# Patient Record
Sex: FEMALE | Race: WHITE | NOT HISPANIC OR LATINO | Employment: PART TIME | URBAN - NONMETROPOLITAN AREA
[De-identification: names, ages, dates, MRNs, and addresses within clinical notes are randomized per-mention and may not be internally consistent; named-entity substitution may affect disease eponyms.]

---
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
## 2019-05-07 ENCOUNTER — OFFICE VISIT (OUTPATIENT)
Dept: OBSTETRICS AND GYNECOLOGY | Facility: CLINIC | Age: 19
End: 2019-05-07

## 2019-05-07 VITALS
DIASTOLIC BLOOD PRESSURE: 56 MMHG | WEIGHT: 207.2 LBS | SYSTOLIC BLOOD PRESSURE: 104 MMHG | BODY MASS INDEX: 38.13 KG/M2 | HEIGHT: 62 IN

## 2019-05-07 DIAGNOSIS — Z31.69 ENCOUNTER FOR PRECONCEPTION CONSULTATION: Primary | ICD-10-CM

## 2019-05-07 DIAGNOSIS — Z31.9 DESIRE FOR PREGNANCY: ICD-10-CM

## 2019-05-07 PROCEDURE — 99213 OFFICE O/P EST LOW 20 MIN: CPT | Performed by: ADVANCED PRACTICE MIDWIFE

## 2019-05-07 NOTE — PROGRESS NOTES
Subjective   Yuridia Irizarry is a 18 y.o. female.     Yuridia is an obese WN, WD female who presents because she desires pregnancy. She has not been using protection for several months. She does not know how to count cycle days, timed intercourse, or ovulation.  She has periods every 21-28 days usually. They are of normal flow & no cramping         The following portions of the patient's history were reviewed and updated as appropriate: allergies, current medications, past family history, past medical history, past social history, past surgical history and problem list.    Review of Systems   Constitutional: Negative.    HENT: Negative.    Eyes: Negative.    Respiratory: Negative.    Cardiovascular: Negative.    Gastrointestinal: Negative.    Endocrine: Negative.    Genitourinary: Negative.    Musculoskeletal: Negative.    Skin: Negative.    Allergic/Immunologic: Negative.    Neurological: Negative.    Hematological: Negative.    Psychiatric/Behavioral: Negative.        Objective   Physical Exam   Constitutional: She is oriented to person, place, and time. She appears well-developed and well-nourished.   HENT:   Head: Normocephalic and atraumatic.   Eyes: EOM are normal. Pupils are equal, round, and reactive to light.   Neck: Normal range of motion.   Pulmonary/Chest: Effort normal.   Musculoskeletal: Normal range of motion.   Neurological: She is alert and oriented to person, place, and time.   Skin: Skin is warm and dry.   Psychiatric: She has a normal mood and affect. Her behavior is normal. Judgment and thought content normal.         Assessment/Plan   1. Desires pregnancy   2. Folic acid for the prevention of neural tube defects was discussed.  At least 0.4 mg should be taken preconceptionally to reduce the risk.  It was explained that this may reduce the baseline incidence of neural tube defect by as much as 65%.  Folic acid can be found in OTC multivitamins, OTC prenatal vitamins or breakfast cereal  that that contains 100% of the RDA of folate.  3, Multivitamin for both self & SO  4. Education given on cycle day, ovulation, & timed intercourse  5. Will test for ovulation & used timed intercourse for 2-3 months. If not ovulating will call office

## 2019-07-30 ENCOUNTER — LAB (OUTPATIENT)
Dept: LAB | Facility: HOSPITAL | Age: 19
End: 2019-07-30

## 2019-07-30 ENCOUNTER — TELEPHONE (OUTPATIENT)
Dept: OBSTETRICS AND GYNECOLOGY | Facility: CLINIC | Age: 19
End: 2019-07-30

## 2019-07-30 DIAGNOSIS — Z32.00 PREGNANCY EXAMINATION OR TEST, PREGNANCY UNCONFIRMED: Primary | ICD-10-CM

## 2019-07-30 DIAGNOSIS — Z32.00 PREGNANCY EXAMINATION OR TEST, PREGNANCY UNCONFIRMED: ICD-10-CM

## 2019-07-30 LAB — HCG INTACT+B SERPL-ACNC: 1.22 MIU/ML

## 2019-07-30 PROCEDURE — 84702 CHORIONIC GONADOTROPIN TEST: CPT

## 2019-07-30 PROCEDURE — 36415 COLL VENOUS BLD VENIPUNCTURE: CPT

## 2019-07-30 NOTE — TELEPHONE ENCOUNTER
----- Message from Alexandra Early sent at 7/30/2019 10:15 AM CDT -----  Pt says she believes she is pregnant and would like labs to confirm. Pt is wanting to come in Thursday to do the labs

## 2019-07-30 NOTE — TELEPHONE ENCOUNTER
This pt says that she had a negative home pregnancy test, but she is having all the symptoms of pregnancy.  She is requesting a serum hcg.  She has an appt on 8/13.  I advised the pt to just keep her appt in August and that we will not do labs until she is seen.  Pt verbalized understanding.

## 2019-08-13 ENCOUNTER — OFFICE VISIT (OUTPATIENT)
Dept: OBSTETRICS AND GYNECOLOGY | Facility: CLINIC | Age: 19
End: 2019-08-13

## 2019-08-13 ENCOUNTER — APPOINTMENT (OUTPATIENT)
Dept: LAB | Facility: HOSPITAL | Age: 19
End: 2019-08-13

## 2019-08-13 VITALS
HEIGHT: 62 IN | DIASTOLIC BLOOD PRESSURE: 67 MMHG | WEIGHT: 202 LBS | HEART RATE: 94 BPM | BODY MASS INDEX: 37.17 KG/M2 | SYSTOLIC BLOOD PRESSURE: 106 MMHG

## 2019-08-13 DIAGNOSIS — N92.6 IRREGULAR PERIODS: Primary | ICD-10-CM

## 2019-08-13 LAB
ESTRADIOL SERPL HS-MCNC: 140 PG/ML
FSH SERPL-ACNC: 4.3 MIU/ML
HBA1C MFR BLD: 5.5 % (ref 4.8–5.6)
LH SERPL-ACNC: 10.2 MIU/ML
PROGEST SERPL-MCNC: <0.05 NG/ML
TSH SERPL DL<=0.05 MIU/L-ACNC: 2.54 MIU/ML (ref 0.27–4.2)

## 2019-08-13 PROCEDURE — 99213 OFFICE O/P EST LOW 20 MIN: CPT | Performed by: NURSE PRACTITIONER

## 2019-08-13 PROCEDURE — 84402 ASSAY OF FREE TESTOSTERONE: CPT | Performed by: NURSE PRACTITIONER

## 2019-08-13 PROCEDURE — 84403 ASSAY OF TOTAL TESTOSTERONE: CPT | Performed by: NURSE PRACTITIONER

## 2019-08-13 PROCEDURE — 82627 DEHYDROEPIANDROSTERONE: CPT | Performed by: NURSE PRACTITIONER

## 2019-08-13 PROCEDURE — 36415 COLL VENOUS BLD VENIPUNCTURE: CPT | Performed by: NURSE PRACTITIONER

## 2019-08-13 PROCEDURE — 83525 ASSAY OF INSULIN: CPT | Performed by: NURSE PRACTITIONER

## 2019-08-13 PROCEDURE — 83036 HEMOGLOBIN GLYCOSYLATED A1C: CPT | Performed by: NURSE PRACTITIONER

## 2019-08-13 PROCEDURE — 84270 ASSAY OF SEX HORMONE GLOBUL: CPT | Performed by: NURSE PRACTITIONER

## 2019-08-13 PROCEDURE — 82670 ASSAY OF TOTAL ESTRADIOL: CPT | Performed by: NURSE PRACTITIONER

## 2019-08-13 PROCEDURE — 84443 ASSAY THYROID STIM HORMONE: CPT | Performed by: NURSE PRACTITIONER

## 2019-08-13 PROCEDURE — 84144 ASSAY OF PROGESTERONE: CPT | Performed by: NURSE PRACTITIONER

## 2019-08-13 PROCEDURE — 83001 ASSAY OF GONADOTROPIN (FSH): CPT | Performed by: NURSE PRACTITIONER

## 2019-08-13 PROCEDURE — 83002 ASSAY OF GONADOTROPIN (LH): CPT | Performed by: NURSE PRACTITIONER

## 2019-08-13 RX ORDER — LANOLIN ALCOHOL/MO/W.PET/CERES
400 CREAM (GRAM) TOPICAL DAILY
COMMUNITY
End: 2020-02-25

## 2019-08-13 NOTE — PROGRESS NOTES
Subjective   Chief Complaint   Patient presents with   • Follow-up     3 month follow up     Yuridia Irizarry is a 18 y.o. year old No obstetric history on file. presenting to be seen with complaints of trouble with her menses and she is wanting to get pregnant.   Current birth control method: none.    Patient's last menstrual period was 08/01/2019 (approximate).    Sometimes her periods are 21-28 days apart but she will miss periods randomly as well. She had a period on May 3rd, June 6th, missed July and started again on August 1st. She used home ovulation kits in the months of May and June and had positive results. She did not test in July.     · Additional notable symptoms: none  · Changes in weight: weight has been stable  · Recent increase in stress? no  · Is there associated pain ? no    Past 6 month menstrual history:    Cycle Frequency: vary between 21 and 28 days   Menstrual cycle character: flow is typically normal   Cycle Duration: 5 - 6   Number of heavy days of flows: 2   Dysmenorrhea: mild and is not affecting her activities of daily living   PMS: mild and is not affecting her activities of daily living   Intermenstrual bleeding present: {no   Post-coital bleeding present: no     She is sexually active.  In the past 12 months there has not been new sexual partners.  Condoms are not typically used.  She would not like to be screened for STD's at today's exam.     No Additional Complaints Reported    The following portions of the patient's history were reviewed and updated as appropriate:problem list, current medications, allergies, past medical history, past social history and past surgical history    Social History    Tobacco Use      Smoking status: Never Smoker      Smokeless tobacco: Never Used    Review of Systems   Constitutional: Negative for activity change, appetite change, diaphoresis, fatigue, unexpected weight gain and unexpected weight loss.   Respiratory: Negative for chest  "tightness and shortness of breath.    Cardiovascular: Negative for chest pain and palpitations.   Gastrointestinal: Negative for abdominal distention, abdominal pain, constipation and diarrhea.   Genitourinary: Positive for menstrual problem. Negative for amenorrhea, breast discharge, breast lump, breast pain, decreased libido, dyspareunia, dysuria, pelvic pain, vaginal bleeding, vaginal discharge and vaginal pain.   Musculoskeletal: Negative for myalgias.   Skin: Negative for color change, dry skin and skin lesions.   Neurological: Negative for light-headedness and headache.   Psychiatric/Behavioral: Negative for agitation, dysphoric mood, sleep disturbance, depressed mood and stress. The patient is not nervous/anxious.         Objective   /67   Pulse 94   Ht 156.2 cm (61.5\")   Wt 91.6 kg (202 lb)   LMP 08/01/2019 (Approximate)   Breastfeeding? No   BMI 37.55 kg/m²     Physical Exam   Constitutional: She is oriented to person, place, and time. Vital signs are normal. She appears well-developed and well-nourished. No distress.   Neck: No thyromegaly present.   Cardiovascular: Normal rate, regular rhythm and normal heart sounds.   Pulmonary/Chest: Effort normal and breath sounds normal.   Neurological: She is alert and oriented to person, place, and time.   Skin: Skin is warm and dry. She is not diaphoretic.   Psychiatric: She has a normal mood and affect. Her behavior is normal.   Nursing note and vitals reviewed.      Lab Review   No data reviewed    Imaging   No data reviewed         Diagnoses and all orders for this visit:    Irregular periods  -     Estradiol  -     Follicle Stimulating Hormone  -     Insulin, Total  -     Luteinizing Hormone  -     Progesterone  -     Sex Horm Binding Globulin  -     Testosterone, F Eqlib & T LC / MS  -     TSH  -     Hemoglobin A1c  -     DHEA-Sulfate    Other orders  -     Prenatal Vit-Fe Fumarate-FA (PRENATAL VITAMIN PO); Take  by mouth.  -     folic acid " (FOLVITE) 400 MCG tablet; Take 400 mcg by mouth Daily.        No orders of the defined types were placed in this encounter.    Will call with lab results when available and discuss any changes.     This note was electronically signed.    ROSALES Spence    August 13, 2019

## 2019-08-14 LAB
DHEA-S SERPL-MCNC: 177.4 UG/DL (ref 110–433.2)
INSULIN SERPL-ACNC: 20 UIU/ML (ref 2.6–24.9)
SHBG SERPL-SCNC: 51.3 NMOL/L (ref 24.6–122)

## 2019-08-16 LAB
TESTOST FREE MFR SERPL: 2.05 % (ref 0.5–2.8)
TESTOST FREE SERPL-MCNC: 0.56 NG/DL (ref 0.1–0.85)
TESTOST SERPL-MCNC: 27.3 NG/DL (ref 10–55)

## 2020-02-18 ENCOUNTER — TELEPHONE (OUTPATIENT)
Dept: OBSTETRICS AND GYNECOLOGY | Facility: CLINIC | Age: 20
End: 2020-02-18

## 2020-02-18 NOTE — TELEPHONE ENCOUNTER
----- Message from ROSALES Nevarez sent at 2/18/2020  1:31 PM CST -----  Just checked her thyroid in August and it was normal. If she has concerns about further issues with her thyroid then she needs to see a PCP.  ----- Message -----  From: Rylee Horvath LPN  Sent: 2/18/2020   1:06 PM CST  To: ROSALES Nevarez        ----- Message -----  From: Mery Arthur RegSched Rep  Sent: 2/18/2020  11:26 AM CST  To: Rylee Horvath LPN    PT WANTS TO GET HER THYROID CHECKED, COULD YOU PUT IN AN ORDER FOR THAT OR DO WE NEED TO SEE HER BEFORE WE PUT THAT ORDER IN

## 2020-02-25 ENCOUNTER — HOSPITAL ENCOUNTER (EMERGENCY)
Facility: HOSPITAL | Age: 20
Discharge: HOME | End: 2020-02-25
Attending: EMERGENCY MEDICINE | Admitting: EMERGENCY MEDICINE

## 2020-02-25 VITALS
SYSTOLIC BLOOD PRESSURE: 94 MMHG | OXYGEN SATURATION: 98 % | HEIGHT: 62 IN | DIASTOLIC BLOOD PRESSURE: 54 MMHG | HEART RATE: 89 BPM | BODY MASS INDEX: 35.33 KG/M2 | RESPIRATION RATE: 16 BRPM | WEIGHT: 192 LBS | TEMPERATURE: 97.9 F

## 2020-02-25 DIAGNOSIS — R55 SYNCOPE AND COLLAPSE: ICD-10-CM

## 2020-02-25 DIAGNOSIS — J10.1 INFLUENZA A: Primary | ICD-10-CM

## 2020-02-25 DIAGNOSIS — E86.0 DEHYDRATION: ICD-10-CM

## 2020-02-25 LAB
ALBUMIN SERPL-MCNC: 4.1 G/DL (ref 3.5–5.2)
ALBUMIN/GLOB SERPL: 1.5 G/DL
ALP SERPL-CCNC: 53 U/L (ref 39–117)
ALT SERPL W P-5'-P-CCNC: 12 U/L (ref 1–33)
ANION GAP SERPL CALCULATED.3IONS-SCNC: 13 MMOL/L (ref 5–15)
AST SERPL-CCNC: 18 U/L (ref 1–32)
BASOPHILS # BLD AUTO: 0.03 10*3/MM3 (ref 0–0.2)
BASOPHILS NFR BLD AUTO: 0.6 % (ref 0–1.5)
BILIRUB SERPL-MCNC: 0.2 MG/DL (ref 0.2–1.2)
BILIRUB UR QL STRIP: NEGATIVE
BUN BLD-MCNC: 10 MG/DL (ref 6–20)
BUN/CREAT SERPL: 15.6 (ref 7–25)
CALCIUM SPEC-SCNC: 9.2 MG/DL (ref 8.6–10.5)
CHLORIDE SERPL-SCNC: 100 MMOL/L (ref 98–107)
CLARITY UR: CLEAR
CO2 SERPL-SCNC: 23 MMOL/L (ref 22–29)
COLOR UR: YELLOW
CREAT BLD-MCNC: 0.64 MG/DL (ref 0.57–1)
DEPRECATED RDW RBC AUTO: 38.4 FL (ref 37–54)
EOSINOPHIL # BLD AUTO: 0 10*3/MM3 (ref 0–0.4)
EOSINOPHIL NFR BLD AUTO: 0 % (ref 0.3–6.2)
ERYTHROCYTE [DISTWIDTH] IN BLOOD BY AUTOMATED COUNT: 12.7 % (ref 12.3–15.4)
GFR SERPL CREATININE-BSD FRML MDRD: 118 ML/MIN/1.73
GLOBULIN UR ELPH-MCNC: 2.8 GM/DL
GLUCOSE BLD-MCNC: 91 MG/DL (ref 65–99)
GLUCOSE UR STRIP-MCNC: NEGATIVE MG/DL
HCG SERPL QL: NEGATIVE
HCT VFR BLD AUTO: 36.9 % (ref 34–46.6)
HGB BLD-MCNC: 12.4 G/DL (ref 12–15.9)
HGB UR QL STRIP.AUTO: NEGATIVE
IMM GRANULOCYTES # BLD AUTO: 0.01 10*3/MM3 (ref 0–0.05)
IMM GRANULOCYTES NFR BLD AUTO: 0.2 % (ref 0–0.5)
KETONES UR QL STRIP: NEGATIVE
LEUKOCYTE ESTERASE UR QL STRIP.AUTO: NEGATIVE
LYMPHOCYTES # BLD AUTO: 0.95 10*3/MM3 (ref 0.7–3.1)
LYMPHOCYTES NFR BLD AUTO: 20.1 % (ref 19.6–45.3)
MCH RBC QN AUTO: 28.1 PG (ref 26.6–33)
MCHC RBC AUTO-ENTMCNC: 33.6 G/DL (ref 31.5–35.7)
MCV RBC AUTO: 83.7 FL (ref 79–97)
MONOCYTES # BLD AUTO: 0.63 10*3/MM3 (ref 0.1–0.9)
MONOCYTES NFR BLD AUTO: 13.3 % (ref 5–12)
NEUTROPHILS # BLD AUTO: 3.1 10*3/MM3 (ref 1.7–7)
NEUTROPHILS NFR BLD AUTO: 65.8 % (ref 42.7–76)
NITRITE UR QL STRIP: NEGATIVE
NRBC BLD AUTO-RTO: 0 /100 WBC (ref 0–0.2)
PH UR STRIP.AUTO: 6 [PH] (ref 5–9)
PLATELET # BLD AUTO: 222 10*3/MM3 (ref 140–450)
PMV BLD AUTO: 9.8 FL (ref 6–12)
POTASSIUM BLD-SCNC: 4 MMOL/L (ref 3.5–5.2)
PROT SERPL-MCNC: 6.9 G/DL (ref 6–8.5)
PROT UR QL STRIP: NEGATIVE
RBC # BLD AUTO: 4.41 10*6/MM3 (ref 3.77–5.28)
SODIUM BLD-SCNC: 136 MMOL/L (ref 136–145)
SP GR UR STRIP: 1.01 (ref 1–1.03)
UROBILINOGEN UR QL STRIP: NORMAL
WBC NRBC COR # BLD: 4.72 10*3/MM3 (ref 3.4–10.8)
WHOLE BLOOD HOLD SPECIMEN: NORMAL

## 2020-02-25 PROCEDURE — 25010000002 ONDANSETRON PER 1 MG: Performed by: NURSE PRACTITIONER

## 2020-02-25 PROCEDURE — 85025 COMPLETE CBC W/AUTO DIFF WBC: CPT | Performed by: NURSE PRACTITIONER

## 2020-02-25 PROCEDURE — 81003 URINALYSIS AUTO W/O SCOPE: CPT | Performed by: NURSE PRACTITIONER

## 2020-02-25 PROCEDURE — 84703 CHORIONIC GONADOTROPIN ASSAY: CPT | Performed by: NURSE PRACTITIONER

## 2020-02-25 PROCEDURE — 99284 EMERGENCY DEPT VISIT MOD MDM: CPT

## 2020-02-25 PROCEDURE — 80053 COMPREHEN METABOLIC PANEL: CPT | Performed by: NURSE PRACTITIONER

## 2020-02-25 PROCEDURE — 96374 THER/PROPH/DIAG INJ IV PUSH: CPT

## 2020-02-25 RX ORDER — ONDANSETRON 2 MG/ML
4 INJECTION INTRAMUSCULAR; INTRAVENOUS ONCE
Status: COMPLETED | OUTPATIENT
Start: 2020-02-25 | End: 2020-02-25

## 2020-02-25 RX ORDER — ONDANSETRON 4 MG/1
4 TABLET, ORALLY DISINTEGRATING ORAL EVERY 6 HOURS PRN
Qty: 8 TABLET | Refills: 0 | Status: SHIPPED | OUTPATIENT
Start: 2020-02-25 | End: 2020-04-15

## 2020-02-25 RX ORDER — SODIUM CHLORIDE 0.9 % (FLUSH) 0.9 %
10 SYRINGE (ML) INJECTION AS NEEDED
Status: DISCONTINUED | OUTPATIENT
Start: 2020-02-25 | End: 2020-02-25 | Stop reason: HOSPADM

## 2020-02-25 RX ORDER — ACETAMINOPHEN 500 MG
1000 TABLET ORAL ONCE
Status: COMPLETED | OUTPATIENT
Start: 2020-02-25 | End: 2020-02-25

## 2020-02-25 RX ADMIN — Medication 10 ML: at 21:34

## 2020-02-25 RX ADMIN — ACETAMINOPHEN 1000 MG: 500 TABLET ORAL at 20:00

## 2020-02-25 RX ADMIN — ONDANSETRON 4 MG: 2 INJECTION INTRAMUSCULAR; INTRAVENOUS at 21:34

## 2020-02-25 RX ADMIN — SODIUM CHLORIDE 1000 ML: 900 INJECTION, SOLUTION INTRAVENOUS at 20:00

## 2020-03-03 ENCOUNTER — OFFICE VISIT (OUTPATIENT)
Dept: OBSTETRICS AND GYNECOLOGY | Facility: CLINIC | Age: 20
End: 2020-03-03

## 2020-03-03 VITALS
HEIGHT: 62 IN | SYSTOLIC BLOOD PRESSURE: 102 MMHG | WEIGHT: 192 LBS | BODY MASS INDEX: 35.33 KG/M2 | DIASTOLIC BLOOD PRESSURE: 70 MMHG

## 2020-03-03 DIAGNOSIS — N92.6 IRREGULAR PERIODS: Primary | ICD-10-CM

## 2020-03-03 DIAGNOSIS — Z11.3 SCREEN FOR STD (SEXUALLY TRANSMITTED DISEASE): ICD-10-CM

## 2020-03-03 PROCEDURE — 87591 N.GONORRHOEAE DNA AMP PROB: CPT | Performed by: NURSE PRACTITIONER

## 2020-03-03 PROCEDURE — 87491 CHLMYD TRACH DNA AMP PROBE: CPT | Performed by: NURSE PRACTITIONER

## 2020-03-03 PROCEDURE — 87661 TRICHOMONAS VAGINALIS AMPLIF: CPT | Performed by: NURSE PRACTITIONER

## 2020-03-03 PROCEDURE — 99214 OFFICE O/P EST MOD 30 MIN: CPT | Performed by: NURSE PRACTITIONER

## 2020-03-04 LAB
C TRACH RRNA CVX QL NAA+PROBE: NEGATIVE
N GONORRHOEA RRNA SPEC QL NAA+PROBE: NEGATIVE
TRICHOMONAS VAGINALIS PCR: NEGATIVE

## 2020-03-06 NOTE — PROGRESS NOTES
Subjective   Chief Complaint   Patient presents with   • Polycystic Ovary Syndrome     Yuridia Irizarry is a 19 y.o. year old No obstetric history on file. presenting to be seen with complaints of trouble with her menses.   Current birth control method: none.    Patient's last menstrual period was 03/02/2020 (approximate). She and her  have been trying to get pregnant for about 1 year.     The last time she recalls having predictable regular cycles was unsure. States that her cycles range from 27-34 days apart. She tracks them on an clair but states that she has not been using home OPKs to test for ovulation.       · Additional notable symptoms: none  · Changes in weight: weight has been stable  · Recent increase in stress? no  · Is there associated pain ? no    Past 6 month menstrual history:    Cycle Frequency: vary between 27 and 34 days   Menstrual cycle character: flow is typically normal   Cycle Duration: 4 - 6   Number of heavy days of flows: 2   Dysmenorrhea: mild and is not affecting her activities of daily living   PMS: mild and is not affecting her activities of daily living   Intermenstrual bleeding present: {no   Post-coital bleeding present: no     She is sexually active.  In the past 12 months there has not been new sexual partners.  Condoms are not typically used.  She would like to be screened for STD's at today's exam.     No Additional Complaints Reported    The following portions of the patient's history were reviewed and updated as appropriate:problem list, current medications, allergies, past family history, past medical history, past social history and past surgical history    Social History    Tobacco Use      Smoking status: Former Smoker        Types: Electronic Cigarette      Smokeless tobacco: Never Used    Review of Systems   Constitutional: Negative for activity change, appetite change, diaphoresis, fatigue, unexpected weight gain and unexpected weight loss.   Respiratory:  "Negative for chest tightness and shortness of breath.    Cardiovascular: Negative for chest pain and palpitations.   Gastrointestinal: Negative for abdominal distention, abdominal pain, constipation and diarrhea.   Endocrine: Negative.    Genitourinary: Positive for menstrual problem. Negative for amenorrhea, breast discharge, breast lump, breast pain, decreased libido, dyspareunia, dysuria, pelvic pain, pelvic pressure, vaginal bleeding, vaginal discharge and vaginal pain.   Musculoskeletal: Negative for myalgias.   Skin: Negative for color change, dry skin and skin lesions.   Neurological: Negative for light-headedness and headache.   Psychiatric/Behavioral: Negative for agitation, dysphoric mood, sleep disturbance, depressed mood and stress. The patient is not nervous/anxious.         Objective   /70   Ht 156.2 cm (61.5\")   Wt 87.1 kg (192 lb)   LMP 03/02/2020 (Approximate)   Breastfeeding No   BMI 35.69 kg/m²     Physical Exam   Constitutional: She is oriented to person, place, and time. Vital signs are normal. She appears well-developed and well-nourished. No distress.   Neck: No thyromegaly present.   Cardiovascular: Normal rate, regular rhythm and normal heart sounds.   Pulmonary/Chest: Effort normal and breath sounds normal.   Neurological: She is alert and oriented to person, place, and time.   Skin: Skin is warm and dry. She is not diaphoretic.   Psychiatric: She has a normal mood and affect. Her behavior is normal.   Nursing note and vitals reviewed.      Lab Review     Component      Latest Ref Rng & Units 8/13/2019   Testosterone, Total      10.0 - 55.0 ng/dL 27.3   Testosterone, Free      0.10 - 0.85 ng/dL 0.56   Testosterone, Free %      0.50 - 2.80 % 2.05   Estradiol      pg/mL 140.0   FSH      mIU/mL 4.30   Insulin      2.6 - 24.9 uIU/mL 20.0   LH      mIU/mL 10.20   Progesterone      ng/mL <0.05   Sex Hormone Binding Globulin      24.6 - 122.0 nmol/L 51.3   TSH Baseline      0.270 - " 4.200 mIU/mL 2.540   Hemoglobin A1C      4.80 - 5.60 % 5.50   DHEA-Sulfate      110.0 - 433.2 ug/dL 177.4       Imaging   No data reviewed         Diagnoses and all orders for this visit:    Irregular periods  -     DHEA-Sulfate  -     Estradiol  -     Follicle Stimulating Hormone  -     Insulin, Total  -     Luteinizing Hormone  -     Lipid Panel  -     Progesterone  -     Sex Horm Binding Globulin  -     Testosterone, F Eqlib & T LC / MS  -     TSH  -     Hemoglobin A1c    Screen for STD (sexually transmitted disease)  -     Chlamydia trachomatis, Neisseria gonorrhoeae, Trichomonas vaginalis, PCR - Urine, Urine, Clean Catch        No orders of the defined types were placed in this encounter.    Recheck labs fasting ASAP. Will call with result when available. Start using home OPKs. If not ovulating the will plan to start her on Clomid. Her  is to come in to leave a semen analysis. States that he previously collected a SA over a year ago at [facility] and thinks that his sperm count was low but can't remember.     This note was electronically signed.    Aida Ames, ROSALES    March 6, 2020

## 2020-04-15 PROBLEM — J45.20 INTERMITTENT ASTHMA WITHOUT COMPLICATION: Status: ACTIVE | Noted: 2020-04-15

## 2020-04-16 PROCEDURE — U0002 COVID-19 LAB TEST NON-CDC: HCPCS | Performed by: NURSE PRACTITIONER

## 2020-12-01 DIAGNOSIS — N92.6 IRREGULAR PERIODS: Primary | ICD-10-CM

## 2020-12-02 ENCOUNTER — LAB (OUTPATIENT)
Dept: LAB | Facility: HOSPITAL | Age: 20
End: 2020-12-02

## 2020-12-02 ENCOUNTER — TELEPHONE (OUTPATIENT)
Dept: OBSTETRICS AND GYNECOLOGY | Facility: CLINIC | Age: 20
End: 2020-12-02

## 2020-12-02 DIAGNOSIS — N92.6 IRREGULAR PERIODS: ICD-10-CM

## 2020-12-02 LAB — HCG INTACT+B SERPL-ACNC: NORMAL MIU/ML

## 2020-12-02 PROCEDURE — 36415 COLL VENOUS BLD VENIPUNCTURE: CPT

## 2020-12-02 PROCEDURE — 84702 CHORIONIC GONADOTROPIN TEST: CPT

## 2020-12-03 ENCOUNTER — TELEPHONE (OUTPATIENT)
Dept: OBSTETRICS AND GYNECOLOGY | Facility: CLINIC | Age: 20
End: 2020-12-03

## 2020-12-03 NOTE — TELEPHONE ENCOUNTER
Patient is wanting to speak to someone about lab results.    Patient can be reached at [phone]

## 2020-12-10 ENCOUNTER — TELEPHONE (OUTPATIENT)
Dept: OBSTETRICS AND GYNECOLOGY | Facility: CLINIC | Age: 20
End: 2020-12-10

## 2020-12-10 RX ORDER — DOCUSATE SODIUM 100 MG/1
100 CAPSULE, LIQUID FILLED ORAL DAILY
Qty: 30 CAPSULE | Refills: 12 | OUTPATIENT
Start: 2020-12-10 | End: 2020-12-26

## 2020-12-10 RX ORDER — ONDANSETRON 8 MG/1
8 TABLET, ORALLY DISINTEGRATING ORAL EVERY 8 HOURS PRN
Qty: 30 TABLET | Refills: 2 | OUTPATIENT
Start: 2020-12-10 | End: 2020-12-26

## 2021-01-07 ENCOUNTER — INITIAL PRENATAL (OUTPATIENT)
Dept: OBSTETRICS AND GYNECOLOGY | Facility: CLINIC | Age: 21
End: 2021-01-07

## 2021-01-07 ENCOUNTER — LAB (OUTPATIENT)
Dept: LAB | Facility: HOSPITAL | Age: 21
End: 2021-01-07

## 2021-01-07 VITALS — SYSTOLIC BLOOD PRESSURE: 102 MMHG | WEIGHT: 198.2 LBS | BODY MASS INDEX: 37.45 KG/M2 | DIASTOLIC BLOOD PRESSURE: 66 MMHG

## 2021-01-07 VITALS — SYSTOLIC BLOOD PRESSURE: 102 MMHG | BODY MASS INDEX: 37.41 KG/M2 | DIASTOLIC BLOOD PRESSURE: 66 MMHG | WEIGHT: 198 LBS

## 2021-01-07 DIAGNOSIS — O99.211 OBESITY AFFECTING PREGNANCY IN FIRST TRIMESTER: ICD-10-CM

## 2021-01-07 DIAGNOSIS — E66.9 OBESITY (BMI 30-39.9): ICD-10-CM

## 2021-01-07 DIAGNOSIS — O36.80X1 EXAMINATION TO DETERMINE FETAL VIABILITY OF PREGNANCY, FETUS 1: ICD-10-CM

## 2021-01-07 DIAGNOSIS — Z34.00 SUPERVISION OF NORMAL FIRST PREGNANCY, ANTEPARTUM: Primary | ICD-10-CM

## 2021-01-07 DIAGNOSIS — Z13.79 GENETIC SCREENING: ICD-10-CM

## 2021-01-07 DIAGNOSIS — N92.6 MISSED PERIOD: ICD-10-CM

## 2021-01-07 DIAGNOSIS — Z34.01 PRIMIGRAVIDA IN FIRST TRIMESTER: Primary | ICD-10-CM

## 2021-01-07 DIAGNOSIS — Z34.00 SUPERVISION OF NORMAL FIRST PREGNANCY, ANTEPARTUM: ICD-10-CM

## 2021-01-07 DIAGNOSIS — Z3A.11 11 WEEKS GESTATION OF PREGNANCY: ICD-10-CM

## 2021-01-07 DIAGNOSIS — O21.9 NAUSEA/VOMITING IN PREGNANCY: ICD-10-CM

## 2021-01-07 LAB
ABO GROUP BLD: NORMAL
BLD GP AB SCN SERPL QL: NEGATIVE
Lab: NORMAL
RH BLD: POSITIVE

## 2021-01-07 PROCEDURE — 87661 TRICHOMONAS VAGINALIS AMPLIF: CPT | Performed by: NURSE PRACTITIONER

## 2021-01-07 PROCEDURE — 36415 COLL VENOUS BLD VENIPUNCTURE: CPT

## 2021-01-07 PROCEDURE — 84144 ASSAY OF PROGESTERONE: CPT | Performed by: NURSE PRACTITIONER

## 2021-01-07 PROCEDURE — 82670 ASSAY OF TOTAL ESTRADIOL: CPT | Performed by: NURSE PRACTITIONER

## 2021-01-07 PROCEDURE — 87491 CHLMYD TRACH DNA AMP PROBE: CPT | Performed by: NURSE PRACTITIONER

## 2021-01-07 PROCEDURE — 84403 ASSAY OF TOTAL TESTOSTERONE: CPT | Performed by: NURSE PRACTITIONER

## 2021-01-07 PROCEDURE — 84443 ASSAY THYROID STIM HORMONE: CPT | Performed by: NURSE PRACTITIONER

## 2021-01-07 PROCEDURE — 84402 ASSAY OF FREE TESTOSTERONE: CPT | Performed by: NURSE PRACTITIONER

## 2021-01-07 PROCEDURE — 83001 ASSAY OF GONADOTROPIN (FSH): CPT | Performed by: NURSE PRACTITIONER

## 2021-01-07 PROCEDURE — 84702 CHORIONIC GONADOTROPIN TEST: CPT

## 2021-01-07 PROCEDURE — 83002 ASSAY OF GONADOTROPIN (LH): CPT | Performed by: NURSE PRACTITIONER

## 2021-01-07 PROCEDURE — 87591 N.GONORRHOEAE DNA AMP PROB: CPT | Performed by: NURSE PRACTITIONER

## 2021-01-07 PROCEDURE — 83036 HEMOGLOBIN GLYCOSYLATED A1C: CPT | Performed by: NURSE PRACTITIONER

## 2021-01-07 PROCEDURE — 84270 ASSAY OF SEX HORMONE GLOBUL: CPT | Performed by: NURSE PRACTITIONER

## 2021-01-07 PROCEDURE — 80081 OBSTETRIC PANEL INC HIV TSTG: CPT | Performed by: NURSE PRACTITIONER

## 2021-01-07 PROCEDURE — 86787 VARICELLA-ZOSTER ANTIBODY: CPT | Performed by: NURSE PRACTITIONER

## 2021-01-07 PROCEDURE — 80061 LIPID PANEL: CPT | Performed by: NURSE PRACTITIONER

## 2021-01-07 PROCEDURE — 0501F PRENATAL FLOW SHEET: CPT | Performed by: NURSE PRACTITIONER

## 2021-01-07 PROCEDURE — 82627 DEHYDROEPIANDROSTERONE: CPT | Performed by: NURSE PRACTITIONER

## 2021-01-07 PROCEDURE — 86803 HEPATITIS C AB TEST: CPT | Performed by: NURSE PRACTITIONER

## 2021-01-07 PROCEDURE — 83525 ASSAY OF INSULIN: CPT | Performed by: NURSE PRACTITIONER

## 2021-01-07 RX ORDER — METOCLOPRAMIDE 10 MG/1
10 TABLET ORAL 4 TIMES DAILY
Qty: 120 TABLET | Refills: 3 | OUTPATIENT
Start: 2021-01-07 | End: 2021-05-25

## 2021-01-07 RX ORDER — LANOLIN ALCOHOL/MO/W.PET/CERES
50 CREAM (GRAM) TOPICAL DAILY
COMMUNITY
End: 2021-05-25

## 2021-01-07 RX ORDER — PRENATAL VIT/IRON FUM/FOLIC AC 27MG-0.8MG
TABLET ORAL DAILY
COMMUNITY
End: 2021-09-07

## 2021-01-07 NOTE — PROGRESS NOTES
[facility]  Obstetrics Visit    CHIEF COMPLAINT:  New prenatal visit    HISTORY OF PRESENT ILLNESS:  Yuridia Irizarry is a 20 y.o. y/o  at 11w0d by LMP (Patient's last menstrual period was 10/22/2020 (within weeks).).  This was a planned pregnancy and the patient is supported by her .  Reports nausea with vomiting.  Reports breast tenderness.  She denies any vaginal bleeding.  She has started taking a prenatal vitamin.    REVIEW OF SYSTEMS  Review of Systems   Constitutional: Negative for activity change, appetite change, chills, diaphoresis, fatigue, fever, unexpected weight gain and unexpected weight loss.   Respiratory: Negative for chest tightness and shortness of breath.    Cardiovascular: Negative for chest pain and palpitations.   Gastrointestinal: Positive for nausea and vomiting. Negative for abdominal distention, abdominal pain, constipation and diarrhea.   Endocrine: Negative.    Genitourinary: Negative for amenorrhea, breast discharge, breast lump, breast pain, decreased libido, dyspareunia, dysuria, menstrual problem, pelvic pain, vaginal bleeding, vaginal discharge and vaginal pain.   Musculoskeletal: Negative for myalgias.   Skin: Negative for color change, dry skin and skin lesions.   Neurological: Negative for light-headedness and headache.   Psychiatric/Behavioral: Negative for agitation, dysphoric mood, sleep disturbance, depressed mood and stress. The patient is not nervous/anxious.        PRENATAL RISK FACTORS   Problems (from 20 to present)     Problem Noted Resolved    Primigravida in first trimester 2021 by Aida Ames, APRN No    Obesity affecting pregnancy in first trimester 2021 by Aida Ames, ROSALES No    Nausea/vomiting in pregnancy 2021 by Aida Ames, ROSALES No          DATING CRITERIA:  LMP (10/22/2020) -- JESSICA 20  1TUS scheduled    OBSTETRIC HISTORY:  OB History    Para Term  AB Living   1              SAB TAB Ectopic Molar Multiple Live Births                    # Outcome Date GA Lbr Erasmo/2nd Weight Sex Delivery Anes PTL Lv   1 Current              GYN HISTORY:  Denies h/o sexually transmitted infections/pelvic inflammatory disease  Denies h/o abnormal pap smears  Last pap smear: Reports that she had one at [facility] in 2020  Last Completed Pap Smear       Status Date      PAP SMEAR No completions recorded        Denies h/o gynecologic surgeries, including biopsies of the cervix    PAST MEDICAL HISTORY:  Past Medical History:   Diagnosis Date   • Asthma    • Injury of back    • Migraine      PAST SURGICAL HISTORY:  Past Surgical History:   Procedure Laterality Date   • TONSILLECTOMY       FAMILY HISTORY:  Family History   Problem Relation Age of Onset   • Seizures Father    • Diabetes Mother    • Arthritis Mother    • No Known Problems Sister    • Breast cancer Paternal Grandmother    • Lung disease Paternal Grandfather    • Asthma Sister      SOCIAL HISTORY:  Social History     Socioeconomic History   • Marital status:      Spouse name: Not on file   • Number of children: Not on file   • Years of education: Not on file   • Highest education level: Not on file   Tobacco Use   • Smoking status: Former Smoker     Types: Electronic Cigarette   • Smokeless tobacco: Never Used   Substance and Sexual Activity   • Alcohol use: Not Currently   • Drug use: Never   • Sexual activity: Yes     Partners: Male     GENETIC SCREENING:  Age >36 yo as of JESSICA: no  Thalassemia: no  NTD: no  CHD: no  Down Syndrome/MR/Fragile X/Autism: no  Ashkenazi Religious with [place], [place], familial dysautonomia: no  Sickle cell disease or trait: no  Hemophilia: no  Muscular dystrophy: no  Cystic fibrosis: no  [place]'s chorea: no  Birth defects: no  Genetic/chromosomal disorders: no    INFECTION HISTORY:  TB exposure: no  HSV: no  Illness since LMP: no  Prior GBS infected child: no  STIs: no    ALLERGIES:  Allergies   Allergen  Reactions   • Rocephin [Ceftriaxone] Rash       MEDICATIONS:  Prior to Admission medications    Medication Sig Start Date End Date Taking? Authorizing Provider   Prenatal Vit-Fe Fumarate-FA (prenatal vitamin 27-0.8) 27-0.8 MG tablet tablet Take  by mouth Daily.   Yes Provider, MD Jeferson   vitamin B-6 (PYRIDOXINE) 50 MG tablet Take 50 mg by mouth Daily.   Yes Provider, Jeferson, MD   metoclopramide (Reglan) 10 MG tablet Take 1 tablet by mouth 4 (Four) Times a Day. 1/7/21   Aida Ames, ROSALES       PHYSICAL EXAM:   /66   Wt 89.8 kg (198 lb)   LMP 10/22/2020 (Within Weeks)   BMI 37.41 kg/m²   General: Alert, healthy, no distress, well nourished and well developed.  Neurologic: Alert, oriented to person, place, and time.  Gait normal.  Cranial nerves II-XII grossly intact.  HEENT: Normocephalic, atraumatic.  Extraocular muscles intact, pupils equal and reactive x2.    Teeth: Normal hygiene.  Neck: Supple, no adenopathy, thyroid normal size, non-tender, without nodularity, trachea midline.  Breasts: No masses, skin dimpling, skin retraction, nipple discharge, or asymmetry bilaterally.  Lungs: Normal respiratory effort.  Clear to auscultation bilaterally.  No wheezes, rhonci, or rales.  Heart: Regular rate and rhythm.  No murmer, rub or gallop.  Abdomen: Soft, non-tender, non-distended,no masses, no hepatosplenomegaly, no hernia.  Skin: No rash, no lesions.  Extremities: No cyanosis, clubbing or edema.  PELVIC EXAM:  External Genitalia/Vulva: Anatomy is normal, no significant redness of labia, no discharge on vulvar tissues, [place]'s and Bartholin's glands are normal, no ulcers, no condylomatous lesions.  Urethra: Normal, no lesions.  Vagina: Vaginal tissues are not inflamed, normal color and texture, no significant discharge present.  Cervix: Normal in appearance without lesions or purulent discharge, no cervical motion tenderness.  Uterus: Normal size, shape, and consistency.  Adnexa: Normal size and  shape bilaterally, no palpable mass bilaterally and non-tender bilaterally.    Bedside ultrasound performed by myself which shows the findings below:  Single, viable IUP around 11w4d with FHR of 168bpm.    IMPRESSION:  Yuridia Irizarry is a 20 y.o.  at 11w0d for a new prenatal visit.    PLAN:  1.  IUP at 11w0d  - Options counseling performed and patient desires continuation of pregnancy to term   - Prenatal labs ordered  - Genetic testing, including cystic fibrosis, was discussed and patient wants testing today  - Continue prenatal vitamins  - Weight gain counseling performed.   - Pregravid BMI >30: Recommend 11-20 lb  - Return to clinic in 4 weeks for return prenatal visit  - Reviewed COVID-19 visitation policy  - Reviewed COVID-19 precautions     Diagnosis Plan   1. Primigravida in first trimester     2. Genetic screening  [facility] NIPS   3. Obesity affecting pregnancy in first trimester     4. Examination to determine fetal viability of pregnancy, fetus 1     5. Nausea/vomiting in pregnancy     6. 11 weeks gestation of pregnancy       Aida Ames, APRN  2021  15:37 CST

## 2021-01-07 NOTE — PROGRESS NOTES
I spent approximately 60 minutes with the patient acquiring the health and history intake and discussing topics related to healthy lifestyle. Her LMP is 10/22/20. She is accompanied by her . This is her 1st pregnancy. A newob bag is given. The 1st trimester teaching was done with the patient. We discussed a healthy diet and exercise and what is recommended. I also discussed Listeriosis and Toxoplasmosis and what fish to avoid due to high mercury levels.  We discussed that spotting may occur after intercourse which is common, but if heavy bleeding like a period occurs to call [facility] or hospital if clinic is closed.  I encouraged her to make an appointment with the dentist if she has not had a dental exam and cleaning in the last 6 months. The patient denies use of alcohol, illicit drug use, and tobacco smoking currently. She plans to breastfeed.  I gave her pamphlet on breastfeeding classes and the breastfeeding mothers support group. These services are provided by India Sutherland, Lactation Consultant. She filled out the health department referral form and depression screening questionnaire. I told the patient that group prenatal education classes are offered here. I instructed patient to let the provider know if she would like to join a group after EDC is established. A Centering Pregnancy pamphlet is given. She is interested in breastfeeding classes and Centering Pregnancy. I encouraged the patient to get the TDAP vaccine in the 3rd trimester.  I discussed with the patient that a pediatrician needs to be chosen prior to delivery for the infant to have an appointment scheduled before leaving the hospital.  I discussed lab tests will be done today. She will be doing an early 1 hr glucola at her next appointment. All questions were answered at this time. Her appointment is with Aida CABA today.

## 2021-01-08 DIAGNOSIS — N92.6 MISSED PERIOD: Primary | ICD-10-CM

## 2021-01-08 LAB
BASOPHILS # BLD AUTO: 0.02 10*3/MM3 (ref 0–0.2)
BASOPHILS NFR BLD AUTO: 0.3 % (ref 0–1.5)
C TRACH RRNA CVX QL NAA+PROBE: NEGATIVE
CHOLEST SERPL-MCNC: 111 MG/DL (ref 0–200)
DEPRECATED RDW RBC AUTO: 40.2 FL (ref 37–54)
EOSINOPHIL # BLD AUTO: 0.02 10*3/MM3 (ref 0–0.4)
EOSINOPHIL NFR BLD AUTO: 0.3 % (ref 0.3–6.2)
ERYTHROCYTE [DISTWIDTH] IN BLOOD BY AUTOMATED COUNT: 13.1 % (ref 12.3–15.4)
ESTRADIOL SERPL HS-MCNC: 1356 PG/ML
FSH SERPL-ACNC: <0.3 MIU/ML
HBA1C MFR BLD: 5.37 % (ref 4.8–5.6)
HBV SURFACE AG SERPL QL IA: NORMAL
HCG INTACT+B SERPL-ACNC: NORMAL MIU/ML
HCT VFR BLD AUTO: 36.8 % (ref 34–46.6)
HCV AB SER DONR QL: NORMAL
HDLC SERPL-MCNC: 52 MG/DL (ref 40–60)
HGB BLD-MCNC: 12.6 G/DL (ref 12–15.9)
HIV1+2 AB SER QL: NORMAL
IMM GRANULOCYTES # BLD AUTO: 0.03 10*3/MM3 (ref 0–0.05)
IMM GRANULOCYTES NFR BLD AUTO: 0.4 % (ref 0–0.5)
LDLC SERPL CALC-MCNC: 46 MG/DL (ref 0–100)
LDLC/HDLC SERPL: 0.91 {RATIO}
LH SERPL-ACNC: 0.65 MIU/ML
LYMPHOCYTES # BLD AUTO: 2.07 10*3/MM3 (ref 0.7–3.1)
LYMPHOCYTES NFR BLD AUTO: 27.1 % (ref 19.6–45.3)
MCH RBC QN AUTO: 29.4 PG (ref 26.6–33)
MCHC RBC AUTO-ENTMCNC: 34.2 G/DL (ref 31.5–35.7)
MCV RBC AUTO: 85.8 FL (ref 79–97)
MONOCYTES # BLD AUTO: 0.43 10*3/MM3 (ref 0.1–0.9)
MONOCYTES NFR BLD AUTO: 5.6 % (ref 5–12)
N GONORRHOEA RRNA SPEC QL NAA+PROBE: NEGATIVE
NEUTROPHILS NFR BLD AUTO: 5.06 10*3/MM3 (ref 1.7–7)
NEUTROPHILS NFR BLD AUTO: 66.3 % (ref 42.7–76)
NRBC BLD AUTO-RTO: 0 /100 WBC (ref 0–0.2)
PLATELET # BLD AUTO: 311 10*3/MM3 (ref 140–450)
PMV BLD AUTO: 10.1 FL (ref 6–12)
PROGEST SERPL-MCNC: 17.2 NG/ML
RBC # BLD AUTO: 4.29 10*6/MM3 (ref 3.77–5.28)
RPR SER QL: NORMAL
RUBV IGG SERPL IA-ACNC: NEGATIVE
TRICHOMONAS VAGINALIS PCR: NEGATIVE
TRIGL SERPL-MCNC: 59 MG/DL (ref 0–150)
TSH SERPL DL<=0.05 MIU/L-ACNC: 2.3 UIU/ML (ref 0.27–4.2)
VLDLC SERPL-MCNC: 13 MG/DL (ref 5–40)
VZV IGG SER IA-ACNC: NEGATIVE
WBC # BLD AUTO: 7.63 10*3/MM3 (ref 3.4–10.8)

## 2021-01-09 LAB
DHEA-S SERPL-MCNC: 164 UG/DL (ref 110–431.7)
INSULIN SERPL-ACNC: 26 UIU/ML (ref 2.6–24.9)
SHBG SERPL-SCNC: 185 NMOL/L (ref 24.6–122)

## 2021-01-11 PROBLEM — Z28.39 MATERNAL VARICELLA, NON-IMMUNE: Status: ACTIVE | Noted: 2021-01-11

## 2021-01-11 PROBLEM — O09.899 MATERNAL VARICELLA, NON-IMMUNE: Status: ACTIVE | Noted: 2021-01-11

## 2021-01-15 LAB
TESTOST FREE MFR SERPL: 1.82 % (ref 0.5–2.8)
TESTOST FREE SERPL-MCNC: 0.96 NG/DL (ref 0.1–0.85)
TESTOST SERPL-MCNC: 52.8 NG/DL (ref 10–55)

## 2021-01-22 DIAGNOSIS — Z13.79 GENETIC SCREENING: ICD-10-CM

## 2021-01-25 ENCOUNTER — TELEPHONE (OUTPATIENT)
Dept: OBSTETRICS AND GYNECOLOGY | Facility: CLINIC | Age: 21
End: 2021-01-25

## 2021-01-25 NOTE — TELEPHONE ENCOUNTER
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

## 2021-01-25 NOTE — TELEPHONE ENCOUNTER
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

## 2021-02-04 ENCOUNTER — LAB (OUTPATIENT)
Dept: LAB | Facility: HOSPITAL | Age: 21
End: 2021-02-04

## 2021-02-04 ENCOUNTER — ROUTINE PRENATAL (OUTPATIENT)
Dept: OBSTETRICS AND GYNECOLOGY | Facility: CLINIC | Age: 21
End: 2021-02-04

## 2021-02-04 VITALS — DIASTOLIC BLOOD PRESSURE: 62 MMHG | SYSTOLIC BLOOD PRESSURE: 104 MMHG | BODY MASS INDEX: 37.22 KG/M2 | WEIGHT: 197 LBS

## 2021-02-04 DIAGNOSIS — O09.899 MATERNAL VARICELLA, NON-IMMUNE: Primary | ICD-10-CM

## 2021-02-04 DIAGNOSIS — Z28.39 MATERNAL VARICELLA, NON-IMMUNE: Primary | ICD-10-CM

## 2021-02-04 DIAGNOSIS — Z34.00 SUPERVISION OF NORMAL FIRST PREGNANCY, ANTEPARTUM: ICD-10-CM

## 2021-02-04 DIAGNOSIS — Z3A.15 15 WEEKS GESTATION OF PREGNANCY: ICD-10-CM

## 2021-02-04 DIAGNOSIS — Z34.02 PRIMIGRAVIDA IN SECOND TRIMESTER: ICD-10-CM

## 2021-02-04 DIAGNOSIS — O99.212 OBESITY AFFECTING PREGNANCY IN SECOND TRIMESTER: ICD-10-CM

## 2021-02-04 DIAGNOSIS — E66.9 OBESITY (BMI 30-39.9): ICD-10-CM

## 2021-02-04 DIAGNOSIS — O21.9 NAUSEA/VOMITING IN PREGNANCY: ICD-10-CM

## 2021-02-04 DIAGNOSIS — Z36.3 ENCOUNTER FOR ANTENATAL SCREENING FOR MALFORMATION USING ULTRASOUND: ICD-10-CM

## 2021-02-04 LAB — GLUCOSE 1H P 100 G GLC PO SERPL-MCNC: 149 MG/DL (ref 60–140)

## 2021-02-04 PROCEDURE — 82950 GLUCOSE TEST: CPT

## 2021-02-04 PROCEDURE — 0502F SUBSEQUENT PRENATAL CARE: CPT | Performed by: NURSE PRACTITIONER

## 2021-02-04 PROCEDURE — 36415 COLL VENOUS BLD VENIPUNCTURE: CPT

## 2021-02-04 NOTE — PROGRESS NOTES
CC: Prenatal visit; hx reviewed, no changes.     Yuridia Irizarry is a 20 y.o.  at 15w0d.  Doing well.  Denies dysuria, abnormal vaginal d/c, headaches, heartburn, constipation, cramping, LOF, or VB.  Reports nausea is well managed with reglan before meals.    /62   Wt 89.4 kg (197 lb)   LMP 10/22/2020 (Within Weeks)   BMI 37.22 kg/m²   SVE: NA  Reviewed NOB lab results and [facility] genetic results. Needs MMR and varicella vaccines PP.           Problems (from 20 to present)     Problem Noted Resolved    Maternal varicella, non-immune 2021 by Aida Ames APRN No    Overview Signed 2021  9:03 AM by Aida Ames APRN     Consider vaccine after delivery         Primigravida in second trimester 2021 by Aida Ames APRN No    Overview Signed 2021  8:58 AM by Aida Ames APRN     A pos/ Rubella NON-immune/ Varicella NON-immune / GBS 36wk  Dating: LMP 21  Genetics: [facility] 21 negative; Female  Tdap: 28wk  Flu: offer next visit  Anatomy: 20wk  1h Glucola: 1T & 24-28  H&H/Plts:   Lab Results   Component Value Date    HGB 12.6 2021    HCT 36.8 2021     2021     Breast?Bottle/BC?         Obesity affecting pregnancy in second trimester 2021 by Aida Ames APRN No    Nausea/vomiting in pregnancy 2021 by Aida Ames APRN No          A/P: Yuridia Irizarry is a 20 y.o.  at 15w0d.  - RTC in 4 weeks  - Reviewed COVID-19 visitation policy  - Reviewed COVID-19 precautions     Diagnosis Plan   1. Maternal varicella, non-immune     2. Primigravida in second trimester  MFM OB US MAD   3. Obesity affecting pregnancy in second trimester     4. Nausea/vomiting in pregnancy     5. Encounter for  screening for malformation using ultrasound  MFM OB US MAD   6. 15 weeks gestation of pregnancy       Aida Ames APRN  2021  14:33 CST

## 2021-02-24 DIAGNOSIS — R89.9 ABNORMAL LABORATORY TEST: Primary | ICD-10-CM

## 2021-03-10 ENCOUNTER — ROUTINE PRENATAL (OUTPATIENT)
Dept: OBSTETRICS AND GYNECOLOGY | Facility: CLINIC | Age: 21
End: 2021-03-10

## 2021-03-10 VITALS — BODY MASS INDEX: 36.39 KG/M2 | SYSTOLIC BLOOD PRESSURE: 104 MMHG | WEIGHT: 192.6 LBS | DIASTOLIC BLOOD PRESSURE: 68 MMHG

## 2021-03-10 DIAGNOSIS — Z3A.19 19 WEEKS GESTATION OF PREGNANCY: Primary | ICD-10-CM

## 2021-03-10 DIAGNOSIS — O09.899 MATERNAL VARICELLA, NON-IMMUNE: ICD-10-CM

## 2021-03-10 DIAGNOSIS — Z36.2 ANTENATAL SCREENING BASED ON AMNIOCENTESIS: ICD-10-CM

## 2021-03-10 DIAGNOSIS — O99.212 OBESITY AFFECTING PREGNANCY IN SECOND TRIMESTER: ICD-10-CM

## 2021-03-10 DIAGNOSIS — O21.9 NAUSEA/VOMITING IN PREGNANCY: ICD-10-CM

## 2021-03-10 DIAGNOSIS — Z34.02 PRIMIGRAVIDA IN SECOND TRIMESTER: ICD-10-CM

## 2021-03-10 DIAGNOSIS — Z28.39 MATERNAL VARICELLA, NON-IMMUNE: ICD-10-CM

## 2021-03-10 PROCEDURE — 0502F SUBSEQUENT PRENATAL CARE: CPT | Performed by: FAMILY MEDICINE

## 2021-03-10 NOTE — PROGRESS NOTES
CC: Prenatal visit    Yuridia Irizarry is a 20 y.o.  at 19w6d.  Doing well.  No complaints.  Denies contractions, LOF, or VB.  Reports good FM. She failed early Glucola but has not scheduled her 3hr OGTT.    US: female, breech, post/fundal placenta, FHR 139bpm, EFW 324g, sub opt Cardiac views noted on prelim report. Findings d/w pt.    /68   Wt 87.4 kg (192 lb 9.6 oz)   LMP 10/22/2020 (Within Weeks)   BMI 36.39 kg/m²   SVE: deferred     Fetal Heart Rate: 139     Problems (from 21 to present)     Problem Noted Resolved    Maternal varicella, non-immune 2021 by Aida Ames APRN No    Overview Signed 2021  9:03 AM by Aida Ames APRN     Consider vaccine after delivery         Primigravida in second trimester 2021 by Aida Ames APRN No    Overview Signed 2021  8:58 AM by Aida Ames APRN     A pos/ Rubella NON-immune/ Varicella NON-immune / GBS 36wk  Dating: LMP 21  Genetics: Invitae 21 negative; Female  Tdap: 28wk  Flu: offer next visit  Anatomy: 20wk  1h Glucola: 1T & 24-28  H&H/Plts:   Lab Results   Component Value Date    HGB 12.6 2021    HCT 36.8 2021     2021     Breast?Bottle/BC?         Obesity affecting pregnancy in second trimester 2021 by Aida Ames APRN No    Nausea/vomiting in pregnancy 2021 by Aida Ames APRN No          A/P: Yruidia Irizarry is a 20 y.o.  at 19w6d.  - RTC in 4 weeks with rpt TAUS for sub opts  - Counseled on importance of completing 3HR OGTT - pt will schedule this ASAP  - Urine collected for UDS/UCx that was missed on new OB labs.     Diagnosis Plan   1. 19 weeks gestation of pregnancy  Urine Culture - Urine, Urine, Clean Catch   2. Maternal varicella, non-immune     3. Primigravida in second trimester     4. Obesity affecting pregnancy in second trimester     5. Nausea/vomiting in pregnancy     6.  screening based on amniocentesis  MFM OB US MAD        Signature  Mildred Becerra MD  [facility], [place] [address]  Office: [phone]      This document has been electronically signed by Mildred Becerra MD on March 10, 2021 09:42 CST

## 2021-03-11 ENCOUNTER — LAB (OUTPATIENT)
Dept: LAB | Facility: HOSPITAL | Age: 21
End: 2021-03-11

## 2021-03-11 DIAGNOSIS — R89.9 ABNORMAL LABORATORY TEST: ICD-10-CM

## 2021-03-11 LAB
AMPHET+METHAMPHET UR QL: NEGATIVE
AMPHETAMINES UR QL: NEGATIVE
BARBITURATES UR QL SCN: NEGATIVE
BENZODIAZ UR QL SCN: NEGATIVE
BILIRUB UR QL STRIP: NEGATIVE
BUPRENORPHINE SERPL-MCNC: NEGATIVE NG/ML
CANNABINOIDS SERPL QL: NEGATIVE
CLARITY UR: CLEAR
COCAINE UR QL: NEGATIVE
COLOR UR: YELLOW
GLUCOSE P FAST SERPL-MCNC: 81 MG/DL (ref 74–106)
GLUCOSE UR STRIP-MCNC: NEGATIVE MG/DL
HGB UR QL STRIP.AUTO: NEGATIVE
KETONES UR QL STRIP: ABNORMAL
LEUKOCYTE ESTERASE UR QL STRIP.AUTO: NEGATIVE
METHADONE UR QL SCN: NEGATIVE
NITRITE UR QL STRIP: NEGATIVE
OPIATES UR QL: NEGATIVE
OXYCODONE UR QL SCN: NEGATIVE
PCP UR QL SCN: NEGATIVE
PH UR STRIP.AUTO: 6.5 [PH] (ref 5–8)
PROPOXYPH UR QL: NEGATIVE
PROT UR QL STRIP: ABNORMAL
SP GR UR STRIP: 1.03 (ref 1–1.03)
TRICYCLICS UR QL SCN: NEGATIVE
UROBILINOGEN UR QL STRIP: ABNORMAL

## 2021-03-11 PROCEDURE — 87086 URINE CULTURE/COLONY COUNT: CPT | Performed by: NURSE PRACTITIONER

## 2021-03-11 PROCEDURE — 81003 URINALYSIS AUTO W/O SCOPE: CPT | Performed by: NURSE PRACTITIONER

## 2021-03-11 PROCEDURE — 36415 COLL VENOUS BLD VENIPUNCTURE: CPT

## 2021-03-11 PROCEDURE — 80306 DRUG TEST PRSMV INSTRMNT: CPT | Performed by: NURSE PRACTITIONER

## 2021-03-12 LAB — BACTERIA SPEC AEROBE CULT: NORMAL

## 2021-03-15 ENCOUNTER — TELEPHONE (OUTPATIENT)
Dept: OBSTETRICS AND GYNECOLOGY | Facility: CLINIC | Age: 21
End: 2021-03-15

## 2021-03-15 RX ORDER — BLOOD-GLUCOSE METER
KIT MISCELLANEOUS
Qty: 1 EACH | Refills: 0 | Status: SHIPPED | OUTPATIENT
Start: 2021-03-15 | End: 2021-05-05

## 2021-03-15 RX ORDER — LANCETS 30 GAUGE
EACH MISCELLANEOUS
Qty: 100 EACH | Refills: 12 | Status: SHIPPED | OUTPATIENT
Start: 2021-03-15 | End: 2021-05-05

## 2021-03-15 RX ORDER — UBIQUINOL 100 MG
CAPSULE ORAL
Qty: 100 EACH | Refills: 2 | Status: SHIPPED | OUTPATIENT
Start: 2021-03-15 | End: 2021-05-05

## 2021-03-15 NOTE — TELEPHONE ENCOUNTER
----- Message from Mildred Becerra MD sent at 3/11/2021 12:13 PM CST -----  Please find out why her 3HR OGTT was canceled.  If she did not tolerate the test I would suggest at home finger sticks.

## 2021-03-16 ENCOUNTER — TELEPHONE (OUTPATIENT)
Dept: OBSTETRICS AND GYNECOLOGY | Facility: CLINIC | Age: 21
End: 2021-03-16

## 2021-03-16 NOTE — TELEPHONE ENCOUNTER
I called this patient and left a message for her to call me back about how to test her blood sugars.

## 2021-03-16 NOTE — TELEPHONE ENCOUNTER
PATIENT CALLED AND SAID SHE THROW UP WHEN TRYING TO DO HER 3 HR GLUCOSE TEST AND IS GOING BACK AGAIN ON Thursday

## 2021-03-17 ENCOUNTER — TELEPHONE (OUTPATIENT)
Dept: OBSTETRICS AND GYNECOLOGY | Facility: CLINIC | Age: 21
End: 2021-03-17

## 2021-03-17 NOTE — TELEPHONE ENCOUNTER
I tried to call patient to talk to her about Centering Pregnancy. She did not answer. I left a message for her to call me.

## 2021-03-18 ENCOUNTER — TELEPHONE (OUTPATIENT)
Dept: OBSTETRICS AND GYNECOLOGY | Facility: CLINIC | Age: 21
End: 2021-03-18

## 2021-03-18 NOTE — TELEPHONE ENCOUNTER
I have tried to reach this patient for one full week about her glucose test, and I am unable to reach her.  We will discuss this with her at her next appointment.

## 2021-03-19 ENCOUNTER — TELEPHONE (OUTPATIENT)
Dept: OBSTETRICS AND GYNECOLOGY | Facility: CLINIC | Age: 21
End: 2021-03-19

## 2021-03-19 NOTE — TELEPHONE ENCOUNTER
PATIENT RETURNED YOUR PHONE CALL..ASKED WHY SHE DIDN'T KEEP HER 3HR  GLUCOSE APPOINTMENT.   SHE IS JUST DIDN'T GO BECAUSE SHE HAS GOTTEN A GLUCOSE MONITOR

## 2021-03-23 ENCOUNTER — TELEPHONE (OUTPATIENT)
Dept: OBSTETRICS AND GYNECOLOGY | Facility: CLINIC | Age: 21
End: 2021-03-23

## 2021-03-23 NOTE — TELEPHONE ENCOUNTER
I missed the patient returning my call from last week. I had originally called her because she had said she was interested in Centering. I called to see if she was still interested and to give her an appointment for Centering. When I called again today, she did not answer.

## 2021-04-07 ENCOUNTER — ROUTINE PRENATAL (OUTPATIENT)
Dept: OBSTETRICS AND GYNECOLOGY | Facility: CLINIC | Age: 21
End: 2021-04-07

## 2021-04-07 ENCOUNTER — LAB (OUTPATIENT)
Dept: LAB | Facility: HOSPITAL | Age: 21
End: 2021-04-07

## 2021-04-07 VITALS — SYSTOLIC BLOOD PRESSURE: 112 MMHG | BODY MASS INDEX: 37.26 KG/M2 | DIASTOLIC BLOOD PRESSURE: 62 MMHG | WEIGHT: 197.2 LBS

## 2021-04-07 DIAGNOSIS — O99.212 OBESITY AFFECTING PREGNANCY IN SECOND TRIMESTER: ICD-10-CM

## 2021-04-07 DIAGNOSIS — Z34.02 PRIMIGRAVIDA IN SECOND TRIMESTER: ICD-10-CM

## 2021-04-07 DIAGNOSIS — O09.899 MATERNAL VARICELLA, NON-IMMUNE: ICD-10-CM

## 2021-04-07 DIAGNOSIS — Z3A.23 23 WEEKS GESTATION OF PREGNANCY: ICD-10-CM

## 2021-04-07 DIAGNOSIS — Z3A.23 23 WEEKS GESTATION OF PREGNANCY: Primary | ICD-10-CM

## 2021-04-07 DIAGNOSIS — O21.9 NAUSEA/VOMITING IN PREGNANCY: ICD-10-CM

## 2021-04-07 DIAGNOSIS — Z28.39 MATERNAL VARICELLA, NON-IMMUNE: ICD-10-CM

## 2021-04-07 LAB
AMPHET+METHAMPHET UR QL: NEGATIVE
AMPHETAMINES UR QL: NEGATIVE
BARBITURATES UR QL SCN: NEGATIVE
BENZODIAZ UR QL SCN: NEGATIVE
BUPRENORPHINE SERPL-MCNC: NEGATIVE NG/ML
CANNABINOIDS SERPL QL: NEGATIVE
COCAINE UR QL: NEGATIVE
METHADONE UR QL SCN: NEGATIVE
OPIATES UR QL: NEGATIVE
OXYCODONE UR QL SCN: NEGATIVE
PCP UR QL SCN: NEGATIVE
PROPOXYPH UR QL: NEGATIVE
TRICYCLICS UR QL SCN: NEGATIVE

## 2021-04-07 PROCEDURE — 80306 DRUG TEST PRSMV INSTRMNT: CPT

## 2021-04-07 PROCEDURE — 0502F SUBSEQUENT PRENATAL CARE: CPT | Performed by: FAMILY MEDICINE

## 2021-04-07 PROCEDURE — 87086 URINE CULTURE/COLONY COUNT: CPT | Performed by: FAMILY MEDICINE

## 2021-04-07 RX ORDER — BLOOD-GLUCOSE METER
EACH MISCELLANEOUS
COMMUNITY
Start: 2021-03-15 | End: 2021-05-05

## 2021-04-07 NOTE — PROGRESS NOTES
CC: Prenatal visit    Yuridia Irizarry is a 20 y.o.  at 23w6d.  Doing well.  No complaints.  Denies contractions, LOF, or VB.  Reports good FM.  Did not tolerate 3hr OGTT - has done fingersticks at home for T2DM screening - did not bring her log but reports her 1hr pp ; has not been checking fasting levels.    US: female, breech, fundal placenta, FHR 153bpm, EFW 586g; all previous subopt seen & wnl on prelim report. Findings reviewed with pt    /62   Wt 89.4 kg (197 lb 3.2 oz)   LMP 10/22/2020 (Within Weeks)   BMI 37.26 kg/m²   SVE: deferred     Fetal Heart Rate: 153     Problems (from 21 to present)     Problem Noted Resolved    Maternal varicella, non-immune 2021 by Aida Ames APRN No    Overview Signed 2021  9:03 AM by Aida Ames APRN     Consider vaccine after delivery         Primigravida in second trimester 2021 by Aida Ames APRN No    Overview Signed 2021  8:58 AM by Aida Ames APRN     A pos/ Rubella NON-immune/ Varicella NON-immune / GBS 36wk  Dating: LMP 21  Genetics: Invitae 21 negative; Female  Tdap: 28wk  Flu: offer next visit  Anatomy: 20wk  1h Glucola: 1T & 24-28  H&H/Plts:   Lab Results   Component Value Date    HGB 12.6 2021    HCT 36.8 2021     2021     Breast?Bottle/BC?         Obesity affecting pregnancy in second trimester 2021 by Aida Ames APRN No    Nausea/vomiting in pregnancy 2021 by Aida Ames APRN No          A/P: Yuridia Irizarry is a 20 y.o.  at 23w6d.  - RTC in 4 weeks; she will repeat fingersticks for 1 week prior to that appointment & bring log to visit in order to screen for GDM.  - FKC reviewed.  PTL precautions given.  Encouraged to drink 3-4 glasses of ice water if she experiences contractions.  If contractions occur regularly (every 5-10 minutes or less) for 1 hour and do not resolve with drinking fluids and/or taking a warm bath, patient  advised to come to L&D for evaluation.         Diagnosis Plan   1. 23 weeks gestation of pregnancy     2. Maternal varicella, non-immune     3. Primigravida in second trimester     4. Obesity affecting pregnancy in second trimester     5. Nausea/vomiting in pregnancy         Signature  Mildred Becerra MD  [facility] [place] [address]  Office: [phone]      This document has been electronically signed by Mildred Becerra MD on April 7, 2021 10:02 CDT

## 2021-04-08 LAB — BACTERIA SPEC AEROBE CULT: NORMAL

## 2021-04-14 ENCOUNTER — TELEPHONE (OUTPATIENT)
Dept: OBSTETRICS AND GYNECOLOGY | Facility: CLINIC | Age: 21
End: 2021-04-14

## 2021-04-14 ENCOUNTER — DOCUMENTATION (OUTPATIENT)
Dept: OBSTETRICS AND GYNECOLOGY | Facility: CLINIC | Age: 21
End: 2021-04-14

## 2021-05-05 ENCOUNTER — ROUTINE PRENATAL (OUTPATIENT)
Dept: OBSTETRICS AND GYNECOLOGY | Facility: CLINIC | Age: 21
End: 2021-05-05

## 2021-05-05 VITALS — DIASTOLIC BLOOD PRESSURE: 64 MMHG | WEIGHT: 197.8 LBS | BODY MASS INDEX: 37.37 KG/M2 | SYSTOLIC BLOOD PRESSURE: 124 MMHG

## 2021-05-05 DIAGNOSIS — O21.9 NAUSEA/VOMITING IN PREGNANCY: ICD-10-CM

## 2021-05-05 DIAGNOSIS — Z3A.27 27 WEEKS GESTATION OF PREGNANCY: Primary | ICD-10-CM

## 2021-05-05 DIAGNOSIS — Z34.03 PRIMIGRAVIDA IN THIRD TRIMESTER: ICD-10-CM

## 2021-05-05 DIAGNOSIS — O09.899 MATERNAL VARICELLA, NON-IMMUNE: ICD-10-CM

## 2021-05-05 DIAGNOSIS — Z28.39 MATERNAL VARICELLA, NON-IMMUNE: ICD-10-CM

## 2021-05-05 DIAGNOSIS — O99.213 OBESITY AFFECTING PREGNANCY IN THIRD TRIMESTER: ICD-10-CM

## 2021-05-05 PROCEDURE — 0502F SUBSEQUENT PRENATAL CARE: CPT | Performed by: FAMILY MEDICINE

## 2021-05-05 PROCEDURE — 90715 TDAP VACCINE 7 YRS/> IM: CPT | Performed by: FAMILY MEDICINE

## 2021-05-05 PROCEDURE — 90471 IMMUNIZATION ADMIN: CPT | Performed by: FAMILY MEDICINE

## 2021-05-05 NOTE — PROGRESS NOTES
CC: Prenatal visit    Yuridia Irizarry is a 20 y.o.  at 27w6d.  Doing well.  No complaints.  Denies contractions, LOF, or VB.  Reports good FM.  She failed 1hr OGTT & did not tolerate 3HR OGTT.  Home fingersticks wnl.     /64   Wt 89.7 kg (197 lb 12.8 oz)   LMP 10/22/2020 (Within Weeks)   BMI 37.37 kg/m²   SVE: deferred  Fundal Height (cm): 29 cm  Fetal Heart Rate: 140     Problems (from 21 to present)     Problem Noted Resolved    Maternal varicella, non-immune 2021 by Aida Ames APRN No    Overview Signed 2021  9:03 AM by Aida Ames APRN     Consider vaccine after delivery         Primigravida in second trimester 2021 by Aida Ames APRN No    Overview Addendum 2021 10:07 AM by Mildred Becerra MD     A pos/ Rubella NON-immune/ Varicella NON-immune / GBS 36wk  Dating: LMP 21  Genetics: Invitae 21 negative; Female  Tdap: 28wk  Flu: offer next visit  Anatomy: female  1h Glucola: failed, passed fingersticks.  3T fingersticks: pending  H&H/Plts:   Lab Results   Component Value Date    HGB 12.6 2021    HCT 36.8 2021     2021     Breast?Bottle/BC?         Previous Version    Obesity affecting pregnancy in second trimester 2021 by Aida Ames APRN No    Nausea/vomiting in pregnancy 2021 by Aida Ames APRN No          A/P: Yuridia Irizarry is a 20 y.o.  at 27w6d.  - RTC in 3 weeks  - TDAP given today  - FKC reviewed.  PTL precautions given.  Encouraged to drink 3-4 glasses of ice water if she experiences contractions.  If contractions occur regularly (every 5-10 minutes or less) for 1 hour and do not resolve with drinking fluids and/or taking a warm bath, patient advised to come to L&D for evaluation.       Diagnosis Plan   1. 27 weeks gestation of pregnancy  Tdap Vaccine Greater Than or Equal To 6yo IM   2. Maternal varicella, non-immune     3. Primigravida in third trimester     4. Obesity  affecting pregnancy in third trimester     5. Nausea/vomiting in pregnancy         Signature  Mildred Becerra MD  [address], [place] [address]  Office: [phone]      This document has been electronically signed by Mildred Becerra MD on May 5, 2021 09:46 CDT

## 2021-05-26 ENCOUNTER — ROUTINE PRENATAL (OUTPATIENT)
Dept: OBSTETRICS AND GYNECOLOGY | Facility: CLINIC | Age: 21
End: 2021-05-26

## 2021-05-26 VITALS — BODY MASS INDEX: 38.29 KG/M2 | SYSTOLIC BLOOD PRESSURE: 108 MMHG | DIASTOLIC BLOOD PRESSURE: 62 MMHG | WEIGHT: 206 LBS

## 2021-05-26 DIAGNOSIS — Z34.03 PRIMIGRAVIDA IN THIRD TRIMESTER: ICD-10-CM

## 2021-05-26 DIAGNOSIS — Z3A.31 31 WEEKS GESTATION OF PREGNANCY: Primary | ICD-10-CM

## 2021-05-26 PROCEDURE — 0502F SUBSEQUENT PRENATAL CARE: CPT | Performed by: NURSE PRACTITIONER

## 2021-05-26 NOTE — PROGRESS NOTES
CC: Prenatal visit    Yuridia Irizarry is a 20 y.o.  at 30w6d.  Went to the ER yesterday for a viral URI. Not taking medications at this time. Was prescribed an inhaler, did not fill it due to being short on money.  Denies contractions, LOF, or VB.  Reports good FM. Pt plans to breastfeed and wants the pill for birth control, does not want the Nexplanon. Previously had the Nexplanon.    /62   Wt 93.4 kg (206 lb)   LMP 10/22/2020 (Within Weeks)   BMI 38.29 kg/m²   SVE: Deferred  Fundal Height (cm): 31 cm  Fetal Heart Rate: 158     Problems (from 21 to present)     Problem Noted Resolved    Maternal varicella, non-immune 2021 by Aida Ames APRN No    Overview Signed 2021  9:03 AM by Aida Ames APRN     Consider vaccine after delivery         Primigravida in third trimester 2021 by Aida Ames APRN No    Overview Addendum 2021  8:53 AM by Priyanka Cerna APRN     A pos/ Rubella NON-immune/ Varicella NON-immune / GBS 36wk  Dating: LMP 21  Genetics: Invitae 21 negative; Female  Tdap: 21  Flu: offer next visit  Anatomy: female  1h Glucola: failed, passed fingersticks.  H&H/Plts:   Lab Results   Component Value Date    HGB 12.6 2021    HCT 36.8 2021     2021     Plans to Breastfeed/BC:pills         Previous Version    Obesity affecting pregnancy in third trimester 2021 by Aida Ames APRN No    Nausea/vomiting in pregnancy 2021 by Aida Ames APRN No          A/P: Yuridia Irizarry is a 20 y.o.  at 30w6d.  -  labor and fetal kick counts precautions   - Reviewed GBS swab 36 weeks  - Discussed comfort measures for a viral URI  - Reviewed contraceptive options available for breastfeeding; can't be on pills with estrogen.  - RTC in 2 weeks  - Reviewed COVID-19 visitation policy  - Reviewed COVID-19 precautions     Diagnosis Plan   1. 31 weeks gestation of pregnancy     2. Primigravida in  third trimester       Priyanka Benoit, APRN  5/26/2021  09:24 CDT

## 2021-06-09 ENCOUNTER — APPOINTMENT (OUTPATIENT)
Dept: ULTRASOUND IMAGING | Facility: HOSPITAL | Age: 21
End: 2021-06-09

## 2021-06-09 ENCOUNTER — HOSPITAL ENCOUNTER (OUTPATIENT)
Facility: HOSPITAL | Age: 21
Discharge: HOME | End: 2021-06-09
Attending: OBSTETRICS & GYNECOLOGY | Admitting: OBSTETRICS & GYNECOLOGY

## 2021-06-09 VITALS
SYSTOLIC BLOOD PRESSURE: 120 MMHG | TEMPERATURE: 98.5 F | OXYGEN SATURATION: 98 % | RESPIRATION RATE: 20 BRPM | HEART RATE: 111 BPM | HEIGHT: 61 IN | BODY MASS INDEX: 38.71 KG/M2 | WEIGHT: 205 LBS | DIASTOLIC BLOOD PRESSURE: 68 MMHG

## 2021-06-09 LAB
BACTERIA UR QL AUTO: ABNORMAL /HPF
BILIRUB UR QL STRIP: NEGATIVE
BLOODY SPECIMEN?: NO
CLARITY UR: ABNORMAL
COLOR UR: YELLOW
FIBRONECTIN FETAL VAG QL: NEGATIVE
GLUCOSE UR STRIP-MCNC: NEGATIVE MG/DL
HGB UR QL STRIP.AUTO: NEGATIVE
HYALINE CASTS UR QL AUTO: ABNORMAL /LPF
KETONES UR QL STRIP: ABNORMAL
LEUKOCYTE ESTERASE UR QL STRIP.AUTO: ABNORMAL
NITRITE UR QL STRIP: NEGATIVE
PH UR STRIP.AUTO: 7 [PH] (ref 5–9)
PROT UR QL STRIP: NEGATIVE
RBC # UR: ABNORMAL /HPF
REF LAB TEST METHOD: ABNORMAL
SP GR UR STRIP: 1.03 (ref 1–1.03)
SQUAMOUS #/AREA URNS HPF: ABNORMAL /HPF
UROBILINOGEN UR QL STRIP: ABNORMAL
WBC UR QL AUTO: ABNORMAL /HPF

## 2021-06-09 PROCEDURE — 76816 OB US FOLLOW-UP PER FETUS: CPT

## 2021-06-09 PROCEDURE — 59025 FETAL NON-STRESS TEST: CPT

## 2021-06-09 PROCEDURE — 81001 URINALYSIS AUTO W/SCOPE: CPT | Performed by: OBSTETRICS & GYNECOLOGY

## 2021-06-09 PROCEDURE — 82731 ASSAY OF FETAL FIBRONECTIN: CPT | Performed by: OBSTETRICS & GYNECOLOGY

## 2021-06-09 PROCEDURE — 51703 INSERT BLADDER CATH COMPLEX: CPT

## 2021-06-09 PROCEDURE — G0463 HOSPITAL OUTPT CLINIC VISIT: HCPCS

## 2021-06-09 PROCEDURE — 76817 TRANSVAGINAL US OBSTETRIC: CPT

## 2021-06-09 PROCEDURE — 99214 OFFICE O/P EST MOD 30 MIN: CPT | Performed by: OBSTETRICS & GYNECOLOGY

## 2021-06-09 PROCEDURE — 59025 FETAL NON-STRESS TEST: CPT | Performed by: OBSTETRICS & GYNECOLOGY

## 2021-06-09 RX ORDER — GRANULES FOR ORAL 3 G/1
3 POWDER ORAL ONCE
Status: COMPLETED | OUTPATIENT
Start: 2021-06-09 | End: 2021-06-09

## 2021-06-09 RX ADMIN — FOSFOMYCIN TROMETHAMINE 3 G: 3 POWDER ORAL at 18:53

## 2021-06-09 NOTE — DISCHARGE INSTRUCTIONS
Return to labor and delivery for regular contractions every 2-3 mins for 2 hours, if your water breaks, vaginal bleeding, decreased fetal movement.  Keep next scheduled appt and call [phone] for any concerns or problems.

## 2021-06-09 NOTE — PROGRESS NOTES
[facility]  Yuridia Irizarry  : 2000  MRN: [id number]  CSN: [id number]    Progress note    Patient is a 20 y.o. female  currently at 32w6d, who presents with with complaints of contractions.  She complains of crampy pain over the uterus at worst 3 of 10 with radiation to the back.  Denies associated rupture of membranes or vaginal bleeding.        On toco diameter she is having some contractions every 2 to 5 minutes      Min/max vitals past 24 hours:  Temp  Min: 98.5 °F (36.9 °C)  Max: 98.5 °F (36.9 °C)   BP  Min: 120/68  Max: 120/68   Pulse  Min: 111  Max: 111   Resp  Min: 20  Max: 20      Physical examination: Lungs clear heart regular rate and rhythm abdomen soft nontender uterus is soft and nontender there are some very mild palpable contractions extremities negative    Vaginal examination by nursing staff is fingertip        Lab Results (last 24 hours)     Procedure Component Value Units Date/Time    Fetal Fibronectin - Vaginal Fluid, Cervix [[id number]] Collected: 21 172    Specimen: Vaginal Fluid from Cervix Updated: 21 1809     Fetal Fibronectin Negative     Bloody Specimen? NO    Urinalysis With Microscopic If Indicated (No Culture) - Urine, Clean Catch [[id number]]  (Abnormal) Collected: 21    Specimen: Urine, Clean Catch Updated: 21 170     Color, UA Yellow     Appearance, UA Cloudy     pH, UA 7.0     Specific Gravity, UA 1.026     Glucose, UA Negative     Ketones, UA Trace     Bilirubin, UA Negative     Blood, UA Negative     Protein, UA Negative     Leuk Esterase, UA Moderate (2+)     Nitrite, UA Negative     Urobilinogen, UA 1.0 E.U./dL    Urinalysis, Microscopic Only - Urine, Clean Catch [[id number]]  (Abnormal) Collected: 21 1653    Specimen: Urine, Clean Catch Updated: 21 1705     RBC, UA 3-5 /HPF      WBC, UA 21-30 /HPF      Bacteria, UA 1+ /HPF      Squamous Epithelial Cells, UA 3-5 /HPF      Hyaline Casts, UA 7-12 /LPF       Methodology Automated Microscopy          Imaging Results (Last 24 Hours)     Procedure Component Value Units Date/Time    US Ob Follow Up Transabdominal Approach [[id number]] Resulted: 21     Updated: 21    US Ob Transvaginal [[id number]] Resulted: 21     Updated: 21        Assessment and    Plan   1.  contractions at 32-6/7 weeks with negative fetal fibronectin preliminary on ultrasound for transvaginal cervical length is about 3-1/2 cm I think she is at low risk for  delivery.  I think she stable to go home with follow-up as scheduled toward the end of the week to return if contractions become more intense rupture of membranes or other problems.  Patient placed on strict kick counts.  2. Analysis suggestive possible UTI will give Monurol          This document has been electronically signed by Emanuel Rosas MD on 2021 18:38 CDT

## 2021-06-09 NOTE — NON STRESS TEST
Yuridia Irizarry, a  at 32w6d with an JESSICA of 2021, by Last Menstrual Period, was seen at [facility] for a nonstress test.    Chief Complaint   Patient presents with    Abdominal Cramping       Patient Active Problem List   Diagnosis    Intermittent asthma without complication    Primigravida in third trimester    Obesity affecting pregnancy in third trimester    Nausea/vomiting in pregnancy    Maternal varicella, non-immune       Start Time: 1700  Stop Time: 1720    Interpretation A  Nonstress Test Interpretation A: Reactive (21 1700 : India Carranza, RN)  Comments A: Reactive NST confirmed with A  RNC (21 1700 : India Carranza, RN)    Fetal heart rate strip reviewed agree reactive

## 2021-06-11 ENCOUNTER — ROUTINE PRENATAL (OUTPATIENT)
Dept: OBSTETRICS AND GYNECOLOGY | Facility: CLINIC | Age: 21
End: 2021-06-11

## 2021-06-11 VITALS — SYSTOLIC BLOOD PRESSURE: 118 MMHG | WEIGHT: 204 LBS | BODY MASS INDEX: 38.55 KG/M2 | DIASTOLIC BLOOD PRESSURE: 76 MMHG

## 2021-06-11 DIAGNOSIS — Z34.03 PRIMIGRAVIDA IN THIRD TRIMESTER: Primary | ICD-10-CM

## 2021-06-11 DIAGNOSIS — O99.213 OBESITY AFFECTING PREGNANCY IN THIRD TRIMESTER: ICD-10-CM

## 2021-06-11 DIAGNOSIS — Z3A.33 33 WEEKS GESTATION OF PREGNANCY: ICD-10-CM

## 2021-06-11 PROCEDURE — 0502F SUBSEQUENT PRENATAL CARE: CPT | Performed by: NURSE PRACTITIONER

## 2021-06-11 NOTE — PROGRESS NOTES
CC: Prenatal visit    Yuridia Irizarry is a 20 y.o.  at 33w1d.  Doing well.  Denies contractions, LOF, or VB.  Reports good FM. Was see on L&D for  contractions and was treated with Fosfomycin for a UTI. Pt denies any dysuria, back pain, or any concerns today. Desires to return to work on Monday, was instructed to take off work these last few days.     /76   Wt 92.5 kg (204 lb)   LMP 10/22/2020 (Within Weeks)   BMI 38.55 kg/m²   SVE: Deferred  Fundal Height (cm): 33 cm  Fetal Heart Rate: 156     Problems (from 21 to present)     Problem Noted Resolved    Maternal varicella, non-immune 2021 by Aida Ames APRN No    Overview Signed 2021  9:03 AM by Aida Ames APRN     Consider vaccine after delivery         Primigravida in third trimester 2021 by Aida Ames APRN No    Overview Addendum 2021  8:53 AM by Priyanka Cerna APRN     A pos/ Rubella NON-immune/ Varicella NON-immune / GBS 36wk  Dating: LMP 21  Genetics: Invitae 21 negative; Female  Tdap: 21  Flu: offer next visit  Anatomy: female  1h Glucola: failed, passed fingersticks.  H&H/Plts:   Lab Results   Component Value Date    HGB 12.6 2021    HCT 36.8 2021     2021     Plans to Breastfeed/BC:pills         Previous Version    Obesity affecting pregnancy in third trimester 2021 by Aiad Ames APRN No    Nausea/vomiting in pregnancy 2021 by Aida Ames APRN No          A/P: Yuridia Irizarry is a 20 y.o.  at 33w1d.  -  labor and fetal kick count precautions  - Note to return to work given  - RTC in 2 weeks  - Reviewed COVID-19 visitation policy  - Reviewed COVID-19 precautions     Diagnosis Plan   1. Primigravida in third trimester     2. 33 weeks gestation of pregnancy     3. Obesity affecting pregnancy in third trimester       Priyanka Benoit, APRN  2021  09:04 CDT

## 2021-06-25 ENCOUNTER — ROUTINE PRENATAL (OUTPATIENT)
Dept: OBSTETRICS AND GYNECOLOGY | Facility: CLINIC | Age: 21
End: 2021-06-25

## 2021-06-25 VITALS — SYSTOLIC BLOOD PRESSURE: 120 MMHG | DIASTOLIC BLOOD PRESSURE: 84 MMHG | WEIGHT: 208 LBS | BODY MASS INDEX: 39.3 KG/M2

## 2021-06-25 DIAGNOSIS — O99.213 OBESITY AFFECTING PREGNANCY IN THIRD TRIMESTER: ICD-10-CM

## 2021-06-25 DIAGNOSIS — Z13.0 SCREENING FOR IRON DEFICIENCY ANEMIA: ICD-10-CM

## 2021-06-25 DIAGNOSIS — Z34.03 PRIMIGRAVIDA IN THIRD TRIMESTER: Primary | ICD-10-CM

## 2021-06-25 PROBLEM — O21.9 NAUSEA/VOMITING IN PREGNANCY: Status: RESOLVED | Noted: 2021-01-07 | Resolved: 2021-06-25

## 2021-06-25 PROCEDURE — 0502F SUBSEQUENT PRENATAL CARE: CPT | Performed by: NURSE PRACTITIONER

## 2021-06-25 NOTE — PROGRESS NOTES
CC: Prenatal visit    Yuridia Irizarry is a 20 y.o.  at 35w1d.  Doing well.  Denies contractions, LOF, or VB.  Reports good FM. A few nights ago, pt woke up with back pain and is wondering if the baby had turn. Has been having more pelvic pressure and urinary frequency since. Denies dysuria.     /84   Wt 94.3 kg (208 lb)   LMP 10/22/2020 (Within Weeks)   Breastfeeding Unknown   BMI 39.30 kg/m²   SVE: Deferred  Fundal Height (cm): 35 cm  Fetal Heart Rate: 150   Vertex via Vscan     Problems (from 21 to 21)     Problem Noted Resolved    Maternal varicella, non-immune 2021 by Aida Ames APRN No    Overview Signed 2021  9:03 AM by Aida Ames APRN     Consider vaccine after delivery         Primigravida in third trimester 2021 by Aida Ames APRN No    Overview Addendum 2021  8:56 AM by Priyanka Cerna APRN     A pos/ Rubella NON-immune/ Varicella NON-immune / GBS 36wk  Dating: LMP 21  Genetics: Invitae 21 negative; Female  Tdap: 21  Flu: offer next visit  Anatomy: female  1h Glucola: failed, passed fingersticks.  H&H/Plts:   Lab Results   Component Value Date    WBC 7.63 2021    HGB 12.6 2021    HCT 36.8 2021    MCV 85.8 2021     2021       Pump rx sent 2021         Previous Version    Obesity affecting pregnancy in third trimester 2021 by Aida Ames APRN No    Nausea/vomiting in pregnancy 2021 by Aida Ames APRN 2021 by Priyanka Cerna APRN          A/P: Yuridia Irizarry is a 20 y.o.  at 35w1d.  - No recent CBC since 11 weeks, collect at next visit  - Pump rx fax with [facility]  -  labor precautions and Fetal kick count reviewed   - RTC in 1 weeks  - Reviewed COVID-19 visitation policy  - Reviewed COVID-19 precautions     Diagnosis Plan   1. Primigravida in third trimester     2. Obesity affecting pregnancy in third trimester     3. Screening  for iron deficiency anemia  CBC (No Diff)     Priyanka Benoit, APRN  6/25/2021  09:06 CDT

## 2021-06-29 ENCOUNTER — HOSPITAL ENCOUNTER (OUTPATIENT)
Facility: HOSPITAL | Age: 21
Discharge: HOME | End: 2021-06-29
Attending: OBSTETRICS & GYNECOLOGY | Admitting: OBSTETRICS & GYNECOLOGY

## 2021-06-29 VITALS
SYSTOLIC BLOOD PRESSURE: 117 MMHG | BODY MASS INDEX: 39.08 KG/M2 | WEIGHT: 207 LBS | HEART RATE: 103 BPM | DIASTOLIC BLOOD PRESSURE: 58 MMHG | HEIGHT: 61 IN | TEMPERATURE: 97.6 F | RESPIRATION RATE: 20 BRPM

## 2021-06-29 LAB
BACTERIA UR QL AUTO: ABNORMAL /HPF
BILIRUB UR QL STRIP: NEGATIVE
CLARITY UR: CLEAR
COLOR UR: YELLOW
GLUCOSE UR STRIP-MCNC: NEGATIVE MG/DL
HGB UR QL STRIP.AUTO: NEGATIVE
HYALINE CASTS UR QL AUTO: ABNORMAL /LPF
KETONES UR QL STRIP: NEGATIVE
LEUKOCYTE ESTERASE UR QL STRIP.AUTO: ABNORMAL
NITRITE UR QL STRIP: NEGATIVE
PH UR STRIP.AUTO: 6.5 [PH] (ref 5–9)
PROT UR QL STRIP: NEGATIVE
RBC # UR: ABNORMAL /HPF
REF LAB TEST METHOD: ABNORMAL
SP GR UR STRIP: 1.02 (ref 1–1.03)
SQUAMOUS #/AREA URNS HPF: ABNORMAL /HPF
UROBILINOGEN UR QL STRIP: ABNORMAL
WBC UR QL AUTO: ABNORMAL /HPF

## 2021-06-29 PROCEDURE — 59025 FETAL NON-STRESS TEST: CPT | Performed by: OBSTETRICS & GYNECOLOGY

## 2021-06-29 PROCEDURE — 63710000001 DIPHENHYDRAMINE PER 50 MG

## 2021-06-29 PROCEDURE — 81001 URINALYSIS AUTO W/SCOPE: CPT | Performed by: OBSTETRICS & GYNECOLOGY

## 2021-06-29 PROCEDURE — G0463 HOSPITAL OUTPT CLINIC VISIT: HCPCS

## 2021-06-29 PROCEDURE — 59025 FETAL NON-STRESS TEST: CPT

## 2021-06-29 RX ORDER — DIPHENHYDRAMINE HCL 50 MG
50 CAPSULE ORAL ONCE
Status: COMPLETED | OUTPATIENT
Start: 2021-06-29 | End: 2021-06-29

## 2021-06-29 RX ORDER — DIPHENHYDRAMINE HCL 25 MG
CAPSULE ORAL
Status: COMPLETED
Start: 2021-06-29 | End: 2021-06-29

## 2021-06-29 RX ORDER — ACETAMINOPHEN 500 MG
TABLET ORAL
Status: COMPLETED
Start: 2021-06-29 | End: 2021-06-29

## 2021-06-29 RX ORDER — ACETAMINOPHEN 500 MG
1000 TABLET ORAL ONCE
Status: COMPLETED | OUTPATIENT
Start: 2021-06-29 | End: 2021-06-29

## 2021-06-29 RX ADMIN — Medication 1000 MG: at 01:53

## 2021-06-29 RX ADMIN — ACETAMINOPHEN 1000 MG: 500 TABLET, FILM COATED ORAL at 01:53

## 2021-06-29 RX ADMIN — DIPHENHYDRAMINE HYDROCHLORIDE 50 MG: 25 CAPSULE ORAL at 01:54

## 2021-06-29 RX ADMIN — Medication 50 MG: at 01:54

## 2021-07-02 ENCOUNTER — ROUTINE PRENATAL (OUTPATIENT)
Dept: OBSTETRICS AND GYNECOLOGY | Facility: CLINIC | Age: 21
End: 2021-07-02

## 2021-07-02 VITALS — SYSTOLIC BLOOD PRESSURE: 102 MMHG | WEIGHT: 205 LBS | DIASTOLIC BLOOD PRESSURE: 60 MMHG | BODY MASS INDEX: 38.73 KG/M2

## 2021-07-02 DIAGNOSIS — Z36.85 ANTENATAL SCREENING FOR STREPTOCOCCUS B: ICD-10-CM

## 2021-07-02 DIAGNOSIS — Z3A.36 36 WEEKS GESTATION OF PREGNANCY: ICD-10-CM

## 2021-07-02 DIAGNOSIS — Z34.03 PRIMIGRAVIDA IN THIRD TRIMESTER: Primary | ICD-10-CM

## 2021-07-02 DIAGNOSIS — Z28.39 MATERNAL VARICELLA, NON-IMMUNE: ICD-10-CM

## 2021-07-02 DIAGNOSIS — O09.899 MATERNAL VARICELLA, NON-IMMUNE: ICD-10-CM

## 2021-07-02 DIAGNOSIS — O99.213 OBESITY AFFECTING PREGNANCY IN THIRD TRIMESTER: ICD-10-CM

## 2021-07-02 PROCEDURE — 0502F SUBSEQUENT PRENATAL CARE: CPT | Performed by: NURSE PRACTITIONER

## 2021-07-02 PROCEDURE — 87653 STREP B DNA AMP PROBE: CPT | Performed by: NURSE PRACTITIONER

## 2021-07-02 NOTE — PROGRESS NOTES
CC: Prenatal visit; hx reviewed, no changes.    Yuridia Irizarry is a 20 y.o.  at 36w1d.  Doing well.  Denies dysuria, abnormal vaginal d/c, headaches, heartburn, constipation, regular contractions, LOF, or VB.  Reports good FM.    /60   Wt 93 kg (205 lb)   LMP 10/22/2020 (Within Weeks)   Breastfeeding Unknown   BMI 38.73 kg/m²   SVE: 0.5/20/-3; GBS obtained.  Fundal Height (cm): 37 cm  Fetal Heart Rate: 135     Problems (from 21 to present)     Problem Noted Resolved    Maternal varicella, non-immune 2021 by Aida Ames APRN No    Overview Signed 2021  9:03 AM by Aida Ames APRN     Consider vaccine after delivery         Primigravida in third trimester 2021 by Aida Ames APRN No    Overview Addendum 2021  8:56 AM by Priyanka Cerna APRN     A pos/ Rubella NON-immune/ Varicella NON-immune / GBS 36wk  Dating: LMP 21  Genetics: Invitae 21 negative; Female  Tdap: 21  Flu: offer next visit  Anatomy: female  1h Glucola: failed, passed fingersticks.  H&H/Plts:   Lab Results   Component Value Date    WBC 7.63 2021    HGB 12.6 2021    HCT 36.8 2021    MCV 85.8 2021     2021       Pump rx sent 2021         Previous Version    Obesity affecting pregnancy in third trimester 2021 by Aida Ames APRN No    Nausea/vomiting in pregnancy 2021 by Aida Ames APRN 2021 by Priyanka Cerna APRN          A/P: Yuridia Irizarry is a 20 y.o.  at 36w1d.  - RTC in 1 week  - Reviewed COVID-19 visitation policy  - Reviewed COVID-19 precautions     Diagnosis Plan   1. Primigravida in third trimester  Group B Strep (Molecular) - Swab, Vaginal/Rectum   2.  screening for streptococcus B  Group B Strep (Molecular) - Swab, Vaginal/Rectum   3. Maternal varicella, non-immune     4. 36 weeks gestation of pregnancy     5. Obesity affecting pregnancy in third trimester       Aida Ames,  APRN  7/2/2021  08:46 CDT

## 2021-07-03 LAB — GROUP B STREP, DNA: NEGATIVE

## 2021-07-07 ENCOUNTER — HOSPITAL ENCOUNTER (OUTPATIENT)
Facility: HOSPITAL | Age: 21
Discharge: HOME | End: 2021-07-07
Attending: OBSTETRICS & GYNECOLOGY | Admitting: OBSTETRICS & GYNECOLOGY

## 2021-07-07 VITALS — TEMPERATURE: 98.4 F | RESPIRATION RATE: 20 BRPM | HEART RATE: 112 BPM

## 2021-07-07 PROCEDURE — 59025 FETAL NON-STRESS TEST: CPT

## 2021-07-07 PROCEDURE — G0463 HOSPITAL OUTPT CLINIC VISIT: HCPCS

## 2021-07-07 PROCEDURE — 59025 FETAL NON-STRESS TEST: CPT | Performed by: OBSTETRICS & GYNECOLOGY

## 2021-07-07 NOTE — DISCHARGE INSTR - OTHER ORDERS
Return to labor and delivery if you have Regular painful contractions, 2-3 minutes apart.  If you have vaginal bleeding, leaking fluid or your water breaks.  If you have decreased fetal movement.  Drink plenty of water.  Keep your next scheduled appointment.

## 2021-07-07 NOTE — NON STRESS TEST
Yuridia Irizarry, a  at 36w6d with an JESSICA of 2021, by Last Menstrual Period, was seen at [facility] for a nonstress test.    Chief Complaint   Patient presents with   • Rapid Heart Rate   • Dizziness       Patient Active Problem List   Diagnosis   • Intermittent asthma without complication   • Primigravida in third trimester   • Obesity affecting pregnancy in third trimester   • Maternal varicella, non-immune       Start Time:   Stop Time:     Interpretation A  Nonstress Test Interpretation A: Reactive (21 : Anahi Doty, RN)  Comments A: Reviewed with Dr Tam (21 : Anahi Doty, RN)

## 2021-07-09 ENCOUNTER — ROUTINE PRENATAL (OUTPATIENT)
Dept: OBSTETRICS AND GYNECOLOGY | Facility: CLINIC | Age: 21
End: 2021-07-09

## 2021-07-09 VITALS — DIASTOLIC BLOOD PRESSURE: 68 MMHG | BODY MASS INDEX: 38.55 KG/M2 | WEIGHT: 204 LBS | SYSTOLIC BLOOD PRESSURE: 104 MMHG

## 2021-07-09 DIAGNOSIS — O99.213 OBESITY AFFECTING PREGNANCY IN THIRD TRIMESTER: ICD-10-CM

## 2021-07-09 DIAGNOSIS — Z3A.37 37 WEEKS GESTATION OF PREGNANCY: ICD-10-CM

## 2021-07-09 DIAGNOSIS — Z34.03 PRIMIGRAVIDA IN THIRD TRIMESTER: ICD-10-CM

## 2021-07-09 DIAGNOSIS — O09.899 MATERNAL VARICELLA, NON-IMMUNE: Primary | ICD-10-CM

## 2021-07-09 DIAGNOSIS — Z28.39 MATERNAL VARICELLA, NON-IMMUNE: Primary | ICD-10-CM

## 2021-07-09 PROCEDURE — 0502F SUBSEQUENT PRENATAL CARE: CPT | Performed by: NURSE PRACTITIONER

## 2021-07-09 NOTE — PROGRESS NOTES
CC: Prenatal visit    Yuridia Irizarry is a 20 y.o.  at 37w1d.  Doing well.  Denies dysuria, abnormal vaginal d/c, headaches, heartburn, constipation, regular contractions, LOF, or VB.  Reports good FM.    /68   Wt 92.5 kg (204 lb)   LMP 10/22/2020 (Within Weeks)   BMI 38.55 kg/m²   SVE: NA  GBS negative.   Fundal Height (cm): 37 cm  Fetal Heart Rate: 142     Problems (from 21 to present)     Problem Noted Resolved    Maternal varicella, non-immune 2021 by Aida Ames APRN No    Overview Signed 2021  9:03 AM by Aida Ames APRN     Consider vaccine after delivery         Primigravida in third trimester 2021 by Aida Ames APRN No    Overview Addendum 2021  8:56 AM by Priyanka Cerna APRN     A pos/ Rubella NON-immune/ Varicella NON-immune / GBS 36wk  Dating: LMP 21  Genetics: Invitae 21 negative; Female  Tdap: 21  Flu: offer next visit  Anatomy: female  1h Glucola: failed, passed fingersticks.  H&H/Plts:   Lab Results   Component Value Date    WBC 7.63 2021    HGB 12.6 2021    HCT 36.8 2021    MCV 85.8 2021     2021       Pump rx sent 2021         Previous Version    Obesity affecting pregnancy in third trimester 2021 by Aida Ames APRN No    Nausea/vomiting in pregnancy 2021 by Aida Ames APRN 2021 by Priyanka Cerna APRN          A/P: Yuridia Irizarry is a 20 y.o.  at 37w1d.  - RTC in 1 week  - Reviewed COVID-19 visitation policy  - Reviewed COVID-19 precautions     Diagnosis Plan   1. Maternal varicella, non-immune     2. Primigravida in third trimester     3. Obesity affecting pregnancy in third trimester     4. 37 weeks gestation of pregnancy       Aida Ames APRN  2021  13:26 CDT

## 2021-07-16 ENCOUNTER — ROUTINE PRENATAL (OUTPATIENT)
Dept: OBSTETRICS AND GYNECOLOGY | Facility: CLINIC | Age: 21
End: 2021-07-16

## 2021-07-16 VITALS — BODY MASS INDEX: 38.17 KG/M2 | SYSTOLIC BLOOD PRESSURE: 120 MMHG | DIASTOLIC BLOOD PRESSURE: 70 MMHG | WEIGHT: 202 LBS

## 2021-07-16 DIAGNOSIS — O99.213 OBESITY AFFECTING PREGNANCY IN THIRD TRIMESTER: ICD-10-CM

## 2021-07-16 DIAGNOSIS — Z3A.38 38 WEEKS GESTATION OF PREGNANCY: Primary | ICD-10-CM

## 2021-07-16 DIAGNOSIS — Z34.03 PRIMIGRAVIDA IN THIRD TRIMESTER: ICD-10-CM

## 2021-07-16 PROCEDURE — 0502F SUBSEQUENT PRENATAL CARE: CPT | Performed by: NURSE PRACTITIONER

## 2021-07-16 NOTE — PROGRESS NOTES
CC: Prenatal visit    Yuridia Irizarry is a 20 y.o.  at 38w1d.  Doing well.  Denies contractions, LOF, or VB.  Reports good FM. Desires cervical exam today.     /70   Wt 91.6 kg (202 lb)   LMP 10/22/2020 (Within Weeks)   BMI 38.17 kg/m²   SVE: /-3, head ballotable  Fundal Height (cm): 38 cm  Fetal Heart Rate: 140     Problems (from 21 to present)     Problem Noted Resolved    Maternal varicella, non-immune 2021 by Aida Ames APRN No    Overview Signed 2021  9:03 AM by Aida Ames APRN     Consider vaccine after delivery         Primigravida in third trimester 2021 by Aida Ames APRN No    Overview Addendum 2021  1:26 PM by Aida Ames APRN     A pos/ Rubella NON-immune/ Varicella NON-immune / GBS NEG  Dating: LMP 21  Genetics: Invitae 21 negative; Female  Tdap: 21  Flu: offer next visit  Anatomy: female  1h Glucola: failed, passed fingersticks.  H&H/Plts:   Lab Results   Component Value Date    WBC 7.63 2021    HGB 12.6 2021    HCT 36.8 2021    MCV 85.8 2021     2021       Pump rx sent 2021         Previous Version    Obesity affecting pregnancy in third trimester 2021 by Aida Ames APRN No    Nausea/vomiting in pregnancy 2021 by Aida Ames APRN 2021 by Priyanka Cerna APRN          A/P: Yuridia Irizarry is a 20 y.o.  at 38w1d.  - SVE /-3, some spotting after cervical exam; provided reassurance  - Labor and fetal kick count precautions; pt made aware to present to the ER entrance to go to L&D  - RTC in 1 weeks  - Reviewed COVID-19 visitation policy  - Reviewed COVID-19 precautions     Diagnosis Plan   1. 38 weeks gestation of pregnancy     2. Primigravida in third trimester     3. Obesity affecting pregnancy in third trimester       Priyanka Benoit, APRN  2021  14:14 CDT

## 2021-07-19 ENCOUNTER — TELEPHONE (OUTPATIENT)
Dept: OBSTETRICS AND GYNECOLOGY | Facility: CLINIC | Age: 21
End: 2021-07-19

## 2021-07-19 NOTE — TELEPHONE ENCOUNTER
Did you get her fmla papers..the patient is off work and needs these faxed back asap to get pain..[phone]

## 2021-07-22 ENCOUNTER — ROUTINE PRENATAL (OUTPATIENT)
Dept: OBSTETRICS AND GYNECOLOGY | Facility: CLINIC | Age: 21
End: 2021-07-22

## 2021-07-22 VITALS — WEIGHT: 204 LBS | SYSTOLIC BLOOD PRESSURE: 108 MMHG | DIASTOLIC BLOOD PRESSURE: 76 MMHG | BODY MASS INDEX: 38.55 KG/M2

## 2021-07-22 DIAGNOSIS — O99.213 OBESITY AFFECTING PREGNANCY IN THIRD TRIMESTER: ICD-10-CM

## 2021-07-22 DIAGNOSIS — Z3A.39 39 WEEKS GESTATION OF PREGNANCY: ICD-10-CM

## 2021-07-22 DIAGNOSIS — O09.899 MATERNAL VARICELLA, NON-IMMUNE: ICD-10-CM

## 2021-07-22 DIAGNOSIS — Z28.39 MATERNAL VARICELLA, NON-IMMUNE: ICD-10-CM

## 2021-07-22 DIAGNOSIS — Z34.03 PRIMIGRAVIDA IN THIRD TRIMESTER: Primary | ICD-10-CM

## 2021-07-22 PROCEDURE — 0502F SUBSEQUENT PRENATAL CARE: CPT | Performed by: OBSTETRICS & GYNECOLOGY

## 2021-07-22 NOTE — PROGRESS NOTES
CC: Prenatal visit    Yuridia Irizarry is a 20 y.o.  at 39w0d.  Doing well.  Denies regular contractions, LOF, or VB.  Reports good FM.  Lost her mucus plug.    /76   Wt 92.5 kg (204 lb)   LMP 10/22/2020 (Within Weeks)   BMI 38.55 kg/m²   SVE: 60/-2/soft/mid, vertex  Fundal Height (cm): 37 cm  Fetal Heart Rate: 152     Problems (from 20 to present)     Problem Noted Resolved    Maternal varicella, non-immune 2021 by Aida Ames APRN No    Overview Signed 2021  9:03 AM by Aida Ames APRN     Consider vaccine after delivery         Primigravida in third trimester 2021 by Aida Ames APRN No    Overview Addendum 2021  1:26 PM by Aida Ames APRN     A pos/ Rubella NON-immune/ Varicella NON-immune / GBS NEG  Dating: LMP 21  Genetics: Invitae 21 negative; Female  Tdap: 21  Flu: offer next visit  Anatomy: female  1h Glucola: failed, passed fingersticks.  H&H/Plts:   Lab Results   Component Value Date    WBC 7.63 2021    HGB 12.6 2021    HCT 36.8 2021    MCV 85.8 2021     2021       Pump rx sent 2021         Previous Version    Obesity affecting pregnancy in third trimester 2021 by Aida Ames APRN No    Nausea/vomiting in pregnancy 2021 by Aida Ames APRN 2021 by Priyanka Cerna APRN        A/P: Yuridia Irizarry is a 20 y.o.  at 39w0d.  - RTC in 1 weeks  - Declines EIOL, declines scheduling IOL for late term at 41 weeks, wishes to wait until next visit  - Discussed call schedule and delivering provider  - Reviewed COVID-19 visitation policy  - Reviewed COVID-19 precautions     Diagnosis Plan   1. Primigravida in third trimester     2. Maternal varicella, non-immune     3. Obesity affecting pregnancy in third trimester     4. 39 weeks gestation of pregnancy       Dominique Duenas MD  2021  09:06 CDT

## 2021-07-25 ENCOUNTER — HOSPITAL ENCOUNTER (INPATIENT)
Facility: HOSPITAL | Age: 21
LOS: 3 days | Discharge: HOME | End: 2021-07-28
Attending: OBSTETRICS & GYNECOLOGY | Admitting: OBSTETRICS & GYNECOLOGY

## 2021-07-25 PROBLEM — O42.919 PRETERM PREMATURE RUPTURE OF MEMBRANES: Status: ACTIVE | Noted: 2021-07-25

## 2021-07-25 LAB
A1 MICROGLOB PLACENTAL VAG QL: POSITIVE
ABO GROUP BLD: NORMAL
AMPHET+METHAMPHET UR QL: NEGATIVE
AMPHETAMINES UR QL: NEGATIVE
BARBITURATES UR QL SCN: NEGATIVE
BENZODIAZ UR QL SCN: NEGATIVE
BLD GP AB SCN SERPL QL: NEGATIVE
BUPRENORPHINE SERPL-MCNC: NEGATIVE NG/ML
CANNABINOIDS SERPL QL: NEGATIVE
COCAINE UR QL: NEGATIVE
DEPRECATED RDW RBC AUTO: 38.5 FL (ref 37–54)
ERYTHROCYTE [DISTWIDTH] IN BLOOD BY AUTOMATED COUNT: 12.9 % (ref 12.3–15.4)
HCT VFR BLD AUTO: 31 % (ref 34–46.6)
HGB BLD-MCNC: 10.5 G/DL (ref 12–15.9)
MCH RBC QN AUTO: 28.5 PG (ref 26.6–33)
MCHC RBC AUTO-ENTMCNC: 33.9 G/DL (ref 31.5–35.7)
MCV RBC AUTO: 84 FL (ref 79–97)
METHADONE UR QL SCN: NEGATIVE
OPIATES UR QL: NEGATIVE
OXYCODONE UR QL SCN: NEGATIVE
PCP UR QL SCN: NEGATIVE
PLATELET # BLD AUTO: 310 10*3/MM3 (ref 140–450)
PMV BLD AUTO: 10.9 FL (ref 6–12)
PROPOXYPH UR QL: NEGATIVE
RBC # BLD AUTO: 3.69 10*6/MM3 (ref 3.77–5.28)
RH BLD: POSITIVE
T&S EXPIRATION DATE: NORMAL
TRICYCLICS UR QL SCN: NEGATIVE
WBC # BLD AUTO: 9.25 10*3/MM3 (ref 3.4–10.8)

## 2021-07-25 PROCEDURE — 84112 EVAL AMNIOTIC FLUID PROTEIN: CPT | Performed by: OBSTETRICS & GYNECOLOGY

## 2021-07-25 PROCEDURE — 86900 BLOOD TYPING SEROLOGIC ABO: CPT | Performed by: OBSTETRICS & GYNECOLOGY

## 2021-07-25 PROCEDURE — 86850 RBC ANTIBODY SCREEN: CPT | Performed by: OBSTETRICS & GYNECOLOGY

## 2021-07-25 PROCEDURE — S0260 H&P FOR SURGERY: HCPCS | Performed by: OBSTETRICS & GYNECOLOGY

## 2021-07-25 PROCEDURE — 86901 BLOOD TYPING SEROLOGIC RH(D): CPT | Performed by: OBSTETRICS & GYNECOLOGY

## 2021-07-25 PROCEDURE — 80306 DRUG TEST PRSMV INSTRMNT: CPT | Performed by: OBSTETRICS & GYNECOLOGY

## 2021-07-25 PROCEDURE — 85027 COMPLETE CBC AUTOMATED: CPT | Performed by: OBSTETRICS & GYNECOLOGY

## 2021-07-25 RX ORDER — DEXTROSE, SODIUM CHLORIDE, SODIUM LACTATE, POTASSIUM CHLORIDE, AND CALCIUM CHLORIDE 5; .6; .31; .03; .02 G/100ML; G/100ML; G/100ML; G/100ML; G/100ML
125 INJECTION, SOLUTION INTRAVENOUS CONTINUOUS
Status: DISCONTINUED | OUTPATIENT
Start: 2021-07-25 | End: 2021-07-26

## 2021-07-25 RX ORDER — LIDOCAINE HYDROCHLORIDE 10 MG/ML
5 INJECTION, SOLUTION EPIDURAL; INFILTRATION; INTRACAUDAL; PERINEURAL AS NEEDED
Status: DISCONTINUED | OUTPATIENT
Start: 2021-07-25 | End: 2021-07-26 | Stop reason: HOSPADM

## 2021-07-25 RX ORDER — PROMETHAZINE HYDROCHLORIDE 12.5 MG/1
12.5 TABLET ORAL EVERY 6 HOURS PRN
Status: DISCONTINUED | OUTPATIENT
Start: 2021-07-25 | End: 2021-07-26 | Stop reason: HOSPADM

## 2021-07-25 RX ORDER — SODIUM CHLORIDE 0.9 % (FLUSH) 0.9 %
3-10 SYRINGE (ML) INJECTION AS NEEDED
Status: DISCONTINUED | OUTPATIENT
Start: 2021-07-25 | End: 2021-07-26 | Stop reason: HOSPADM

## 2021-07-25 RX ORDER — MISOPROSTOL 100 MCG
25 TABLET ORAL ONCE
Status: DISCONTINUED | OUTPATIENT
Start: 2021-07-25 | End: 2021-07-25

## 2021-07-25 RX ORDER — ALBUTEROL SULFATE 2.5 MG/3ML
2.5 SOLUTION RESPIRATORY (INHALATION) EVERY 4 HOURS PRN
Status: DISCONTINUED | OUTPATIENT
Start: 2021-07-25 | End: 2021-07-28 | Stop reason: HOSPADM

## 2021-07-25 RX ORDER — BUTORPHANOL TARTRATE 1 MG/ML
1 INJECTION, SOLUTION INTRAMUSCULAR; INTRAVENOUS
Status: DISCONTINUED | OUTPATIENT
Start: 2021-07-25 | End: 2021-07-26 | Stop reason: HOSPADM

## 2021-07-25 RX ORDER — SODIUM CHLORIDE 0.9 % (FLUSH) 0.9 %
3 SYRINGE (ML) INJECTION EVERY 12 HOURS SCHEDULED
Status: DISCONTINUED | OUTPATIENT
Start: 2021-07-25 | End: 2021-07-26 | Stop reason: HOSPADM

## 2021-07-25 RX ORDER — OXYTOCIN/0.9 % SODIUM CHLORIDE 30/500 ML
2-20 PLASTIC BAG, INJECTION (ML) INTRAVENOUS
Status: DISCONTINUED | OUTPATIENT
Start: 2021-07-25 | End: 2021-07-26 | Stop reason: HOSPADM

## 2021-07-25 RX ORDER — PROMETHAZINE HYDROCHLORIDE 12.5 MG/1
12.5 SUPPOSITORY RECTAL EVERY 6 HOURS PRN
Status: DISCONTINUED | OUTPATIENT
Start: 2021-07-25 | End: 2021-07-26 | Stop reason: HOSPADM

## 2021-07-25 RX ADMIN — OXYTOCIN-SODIUM CHLORIDE 0.9% IV SOLN 30 UNIT/500ML 2 MILLI-UNITS/MIN: 30-0.9/5 SOLUTION at 16:28

## 2021-07-25 RX ADMIN — SODIUM CHLORIDE, SODIUM LACTATE, POTASSIUM CHLORIDE, CALCIUM CHLORIDE AND DEXTROSE MONOHYDRATE 125 ML/HR: 5; 600; 310; 30; 20 INJECTION, SOLUTION INTRAVENOUS at 23:45

## 2021-07-26 ENCOUNTER — ANESTHESIA EVENT (OUTPATIENT)
Dept: LABOR AND DELIVERY | Facility: HOSPITAL | Age: 21
End: 2021-07-26

## 2021-07-26 ENCOUNTER — ANESTHESIA (OUTPATIENT)
Dept: LABOR AND DELIVERY | Facility: HOSPITAL | Age: 21
End: 2021-07-26

## 2021-07-26 PROBLEM — O42.92 FULL-TERM PREMATURE RUPTURE OF MEMBRANES: Status: ACTIVE | Noted: 2021-07-25

## 2021-07-26 LAB — Lab: NORMAL

## 2021-07-26 PROCEDURE — 25010000003 LIDOCAINE 1 % SOLUTION: Performed by: NURSE ANESTHETIST, CERTIFIED REGISTERED

## 2021-07-26 PROCEDURE — C1755 CATHETER, INTRASPINAL: HCPCS | Performed by: NURSE ANESTHETIST, CERTIFIED REGISTERED

## 2021-07-26 PROCEDURE — 25010000002 PHENYLEPHRINE 10 MG/ML SOLUTION: Performed by: NURSE ANESTHETIST, CERTIFIED REGISTERED

## 2021-07-26 PROCEDURE — 0KQM0ZZ REPAIR PERINEUM MUSCLE, OPEN APPROACH: ICD-10-PCS | Performed by: OBSTETRICS & GYNECOLOGY

## 2021-07-26 PROCEDURE — 51703 INSERT BLADDER CATH COMPLEX: CPT

## 2021-07-26 PROCEDURE — 59400 OBSTETRICAL CARE: CPT | Performed by: OBSTETRICS & GYNECOLOGY

## 2021-07-26 RX ORDER — LIDOCAINE HYDROCHLORIDE 10 MG/ML
INJECTION, SOLUTION INFILTRATION; PERINEURAL AS NEEDED
Status: DISCONTINUED | OUTPATIENT
Start: 2021-07-26 | End: 2021-07-26 | Stop reason: SURG

## 2021-07-26 RX ORDER — METHYLERGONOVINE MALEATE 0.2 MG/ML
200 INJECTION INTRAVENOUS ONCE AS NEEDED
Status: DISCONTINUED | OUTPATIENT
Start: 2021-07-26 | End: 2021-07-26 | Stop reason: HOSPADM

## 2021-07-26 RX ORDER — DOCUSATE SODIUM 100 MG/1
100 CAPSULE, LIQUID FILLED ORAL 2 TIMES DAILY
Status: DISCONTINUED | OUTPATIENT
Start: 2021-07-26 | End: 2021-07-28 | Stop reason: HOSPADM

## 2021-07-26 RX ORDER — ACETAMINOPHEN 500 MG
1000 TABLET ORAL EVERY 6 HOURS
Status: DISCONTINUED | OUTPATIENT
Start: 2021-07-26 | End: 2021-07-28 | Stop reason: HOSPADM

## 2021-07-26 RX ORDER — HYDROCORTISONE 25 MG/G
1 CREAM TOPICAL 3 TIMES DAILY PRN
Status: DISCONTINUED | OUTPATIENT
Start: 2021-07-26 | End: 2021-07-28 | Stop reason: HOSPADM

## 2021-07-26 RX ORDER — SODIUM CHLORIDE 0.9 % (FLUSH) 0.9 %
1-10 SYRINGE (ML) INJECTION AS NEEDED
Status: DISCONTINUED | OUTPATIENT
Start: 2021-07-26 | End: 2021-07-28 | Stop reason: HOSPADM

## 2021-07-26 RX ORDER — OXYTOCIN/0.9 % SODIUM CHLORIDE 30/500 ML
85 PLASTIC BAG, INJECTION (ML) INTRAVENOUS ONCE
Status: COMPLETED | OUTPATIENT
Start: 2021-07-26 | End: 2021-07-26

## 2021-07-26 RX ORDER — IBUPROFEN 800 MG/1
800 TABLET ORAL EVERY 6 HOURS PRN
Status: DISCONTINUED | OUTPATIENT
Start: 2021-07-26 | End: 2021-07-26

## 2021-07-26 RX ORDER — BISACODYL 10 MG
10 SUPPOSITORY, RECTAL RECTAL DAILY PRN
Status: DISCONTINUED | OUTPATIENT
Start: 2021-07-27 | End: 2021-07-28 | Stop reason: HOSPADM

## 2021-07-26 RX ORDER — EPHEDRINE SULFATE 50 MG/ML
INJECTION, SOLUTION INTRAVENOUS AS NEEDED
Status: DISCONTINUED | OUTPATIENT
Start: 2021-07-26 | End: 2021-07-26 | Stop reason: SURG

## 2021-07-26 RX ORDER — ACETAMINOPHEN 325 MG/1
650 TABLET ORAL EVERY 6 HOURS PRN
Status: DISCONTINUED | OUTPATIENT
Start: 2021-07-26 | End: 2021-07-26

## 2021-07-26 RX ORDER — CARBOPROST TROMETHAMINE 250 UG/ML
250 INJECTION, SOLUTION INTRAMUSCULAR AS NEEDED
Status: DISCONTINUED | OUTPATIENT
Start: 2021-07-26 | End: 2021-07-26 | Stop reason: HOSPADM

## 2021-07-26 RX ORDER — MISOPROSTOL 200 UG/1
800 TABLET ORAL AS NEEDED
Status: DISCONTINUED | OUTPATIENT
Start: 2021-07-26 | End: 2021-07-26 | Stop reason: HOSPADM

## 2021-07-26 RX ORDER — PHENYLEPHRINE HYDROCHLORIDE 10 MG/ML
INJECTION INTRAVENOUS AS NEEDED
Status: DISCONTINUED | OUTPATIENT
Start: 2021-07-26 | End: 2021-07-26 | Stop reason: SURG

## 2021-07-26 RX ORDER — OXYTOCIN/0.9 % SODIUM CHLORIDE 30/500 ML
650 PLASTIC BAG, INJECTION (ML) INTRAVENOUS ONCE
Status: COMPLETED | OUTPATIENT
Start: 2021-07-26 | End: 2021-07-26

## 2021-07-26 RX ORDER — LIDOCAINE HYDROCHLORIDE AND EPINEPHRINE 15; 5 MG/ML; UG/ML
INJECTION, SOLUTION EPIDURAL AS NEEDED
Status: DISCONTINUED | OUTPATIENT
Start: 2021-07-26 | End: 2021-07-26 | Stop reason: SURG

## 2021-07-26 RX ORDER — IBUPROFEN 800 MG/1
800 TABLET ORAL EVERY 8 HOURS SCHEDULED
Status: DISCONTINUED | OUTPATIENT
Start: 2021-07-26 | End: 2021-07-28 | Stop reason: HOSPADM

## 2021-07-26 RX ADMIN — SODIUM CHLORIDE, SODIUM LACTATE, POTASSIUM CHLORIDE, CALCIUM CHLORIDE AND DEXTROSE MONOHYDRATE 999 ML/HR: 5; 600; 310; 30; 20 INJECTION, SOLUTION INTRAVENOUS at 00:40

## 2021-07-26 RX ADMIN — ACETAMINOPHEN 650 MG: 325 TABLET, FILM COATED ORAL at 14:11

## 2021-07-26 RX ADMIN — IBUPROFEN 800 MG: 800 TABLET, FILM COATED ORAL at 14:11

## 2021-07-26 RX ADMIN — OXYTOCIN-SODIUM CHLORIDE 0.9% IV SOLN 30 UNIT/500ML 2 MILLI-UNITS/MIN: 30-0.9/5 SOLUTION at 07:22

## 2021-07-26 RX ADMIN — Medication 8 ML/HR: at 10:09

## 2021-07-26 RX ADMIN — LIDOCAINE HYDROCHLORIDE AND EPINEPHRINE 3 ML: 15; 5 INJECTION, SOLUTION EPIDURAL at 00:57

## 2021-07-26 RX ADMIN — SODIUM CHLORIDE, SODIUM LACTATE, POTASSIUM CHLORIDE, CALCIUM CHLORIDE AND DEXTROSE MONOHYDRATE 125 ML/HR: 5; 600; 310; 30; 20 INJECTION, SOLUTION INTRAVENOUS at 09:23

## 2021-07-26 RX ADMIN — OXYTOCIN-SODIUM CHLORIDE 0.9% IV SOLN 30 UNIT/500ML 85 ML/HR: 30-0.9/5 SOLUTION at 11:50

## 2021-07-26 RX ADMIN — OXYTOCIN-SODIUM CHLORIDE 0.9% IV SOLN 30 UNIT/500ML 4 MILLI-UNITS/MIN: 30-0.9/5 SOLUTION at 07:58

## 2021-07-26 RX ADMIN — DOCUSATE SODIUM 100 MG: 100 CAPSULE ORAL at 20:17

## 2021-07-26 RX ADMIN — SODIUM CHLORIDE, POTASSIUM CHLORIDE, SODIUM LACTATE AND CALCIUM CHLORIDE 1000 ML: 600; 310; 30; 20 INJECTION, SOLUTION INTRAVENOUS at 00:00

## 2021-07-26 RX ADMIN — PHENYLEPHRINE HYDROCHLORIDE 100 MCG: 10 INJECTION INTRAVENOUS at 06:47

## 2021-07-26 RX ADMIN — SODIUM CHLORIDE, SODIUM LACTATE, POTASSIUM CHLORIDE, CALCIUM CHLORIDE AND DEXTROSE MONOHYDRATE 999 ML/HR: 5; 600; 310; 30; 20 INJECTION, SOLUTION INTRAVENOUS at 02:00

## 2021-07-26 RX ADMIN — LIDOCAINE HYDROCHLORIDE 30 MG: 10 INJECTION, SOLUTION INFILTRATION; PERINEURAL at 00:53

## 2021-07-26 RX ADMIN — IBUPROFEN 800 MG: 800 TABLET, FILM COATED ORAL at 22:21

## 2021-07-26 RX ADMIN — Medication: at 14:11

## 2021-07-26 RX ADMIN — ACETAMINOPHEN 1000 MG: 500 TABLET, FILM COATED ORAL at 20:17

## 2021-07-26 RX ADMIN — OXYTOCIN-SODIUM CHLORIDE 0.9% IV SOLN 30 UNIT/500ML 650 ML/HR: 30-0.9/5 SOLUTION at 11:19

## 2021-07-26 RX ADMIN — Medication 8 ML/HR: at 01:01

## 2021-07-26 RX ADMIN — EPHEDRINE SULFATE 5 MG: 50 INJECTION INTRAVENOUS at 06:50

## 2021-07-26 NOTE — ANESTHESIA PROCEDURE NOTES
Labor Epidural    Pre-sedation assessment completed: 7/26/2021 12:49 AM    Patient reassessed immediately prior to procedure    Patient location during procedure: OB  Start Time: 7/26/2021 12:53 AM  Stop Time: 7/26/2021 12:57 AM  Performed By  CRNA: Henrique Gore CRNA  Preanesthetic Checklist  Completed: patient identified, IV checked, site marked, risks and benefits discussed, surgical consent, monitors and equipment checked, pre-op evaluation and timeout performed  Prep:  Pt Position:sitting  Sterile Tech:gloves, cap, sterile barrier and mask  Prep:povidone-iodine 7.5% surgical scrub  Monitoring:continuous pulse oximetry, EKG and blood pressure monitoring  Epidural Block Procedure:  Approach:midline  Guidance:landmark technique and palpation technique  Location:L4-L5  Needle Type:Tuohy  Needle Gauge:17 G  Loss of Resistance Medium: saline  Loss of Resistance: 7cm  Cath Depth at skin:12 cm  Paresthesia: none  Aspiration:negative  Test Dose:negative  Med administered at 7/26/2021 12:57 AM  Number of Attempts: 1  Post Assessment:  Dressing:secured with tape and occlusive dressing applied  Pt Tolerance:patient tolerated the procedure well with no apparent complications  Complications:no

## 2021-07-26 NOTE — ANESTHESIA PREPROCEDURE EVALUATION
Anesthesia Evaluation     NPO Solid Status: < 2 hours  NPO Liquid Status: < 2 hours           Airway   Mallampati: II  TM distance: >3 FB  Neck ROM: full  Dental      Pulmonary - normal exam   (+) asthma,  Cardiovascular - normal exam        Neuro/Psych  GI/Hepatic/Renal/Endo    (+) obesity,       Musculoskeletal     (+) back pain,   Abdominal    Substance History   (+) alcohol use (prior to pregnancy),      OB/GYN    (+) Pregnant,         Other                        Anesthesia Plan    ASA 3     epidural       Anesthetic plan, all risks, benefits, and alternatives have been provided, discussed and informed consent has been obtained with: patient.

## 2021-07-26 NOTE — ANESTHESIA POSTPROCEDURE EVALUATION
Patient: Yuridia Irizarry    Procedure Summary     Date: 07/26/21 Room / Location:     Anesthesia Start: 0049 Anesthesia Stop: 1358    Procedure: LABOR ANALGESIA Diagnosis:     Scheduled Providers:  Provider: Henrique Gore CRNA    Anesthesia Type: epidural ASA Status: 3          Anesthesia Type: epidural    Vitals  Vitals Value Taken Time   /68 07/26/21 1325   Temp 98.2 °F (36.8 °C) 07/26/21 1126   Pulse 81 07/26/21 1325   Resp 18 07/26/21 1126   SpO2 89 % 07/26/21 1126   Vitals shown include unvalidated device data.        Post Anesthesia Care and Evaluation    Patient location during evaluation: bedside  Patient participation: complete - patient participated  Level of consciousness: awake and alert  Pain score: 0  Pain management: adequate  Airway patency: patent  Anesthetic complications: No anesthetic complications  PONV Status: none  Cardiovascular status: acceptable  Respiratory status: acceptable  Hydration status: acceptable  Post Neuraxial Block status: Motor and sensory function returned to baseline and No signs or symptoms of PDPH  Comments: --------------------            07/26/21               1226     --------------------   BP:       106/64     Pulse:      75       Resp:                Temp:                SpO2:               --------------------

## 2021-07-27 PROCEDURE — 0503F POSTPARTUM CARE VISIT: CPT | Performed by: STUDENT IN AN ORGANIZED HEALTH CARE EDUCATION/TRAINING PROGRAM

## 2021-07-27 RX ADMIN — IBUPROFEN 800 MG: 800 TABLET, FILM COATED ORAL at 21:05

## 2021-07-27 RX ADMIN — ACETAMINOPHEN 1000 MG: 500 TABLET, FILM COATED ORAL at 01:28

## 2021-07-27 RX ADMIN — DOCUSATE SODIUM 100 MG: 100 CAPSULE ORAL at 08:51

## 2021-07-27 RX ADMIN — DOCUSATE SODIUM 100 MG: 100 CAPSULE ORAL at 20:24

## 2021-07-27 RX ADMIN — IBUPROFEN 800 MG: 800 TABLET, FILM COATED ORAL at 05:12

## 2021-07-27 RX ADMIN — ACETAMINOPHEN 1000 MG: 500 TABLET, FILM COATED ORAL at 08:51

## 2021-07-27 RX ADMIN — IBUPROFEN 800 MG: 800 TABLET, FILM COATED ORAL at 14:22

## 2021-07-27 RX ADMIN — ACETAMINOPHEN 1000 MG: 500 TABLET, FILM COATED ORAL at 14:22

## 2021-07-27 RX ADMIN — ACETAMINOPHEN 1000 MG: 500 TABLET, FILM COATED ORAL at 20:24

## 2021-07-28 VITALS
HEART RATE: 82 BPM | SYSTOLIC BLOOD PRESSURE: 121 MMHG | TEMPERATURE: 98.2 F | WEIGHT: 204 LBS | OXYGEN SATURATION: 100 % | RESPIRATION RATE: 18 BRPM | BODY MASS INDEX: 38.51 KG/M2 | DIASTOLIC BLOOD PRESSURE: 71 MMHG | HEIGHT: 61 IN

## 2021-07-28 PROCEDURE — 0503F POSTPARTUM CARE VISIT: CPT | Performed by: OBSTETRICS & GYNECOLOGY

## 2021-07-28 RX ORDER — IBUPROFEN 800 MG/1
800 TABLET ORAL EVERY 8 HOURS PRN
Qty: 30 TABLET | Refills: 1 | OUTPATIENT
Start: 2021-07-28 | End: 2022-01-20

## 2021-07-28 RX ORDER — ACETAMINOPHEN 500 MG
1000 TABLET ORAL EVERY 6 HOURS PRN
Qty: 30 TABLET | Refills: 1 | OUTPATIENT
Start: 2021-07-28 | End: 2022-01-20

## 2021-07-28 RX ADMIN — ACETAMINOPHEN 1000 MG: 500 TABLET, FILM COATED ORAL at 08:10

## 2021-07-28 RX ADMIN — ACETAMINOPHEN 1000 MG: 500 TABLET, FILM COATED ORAL at 01:37

## 2021-07-28 RX ADMIN — IBUPROFEN 800 MG: 800 TABLET, FILM COATED ORAL at 05:54

## 2021-07-28 RX ADMIN — DOCUSATE SODIUM 100 MG: 100 CAPSULE ORAL at 08:10

## 2021-08-02 ENCOUNTER — TELEPHONE (OUTPATIENT)
Dept: LACTATION | Facility: HOSPITAL | Age: 21
End: 2021-08-02

## 2021-08-04 ENCOUNTER — TELEPHONE (OUTPATIENT)
Dept: OBSTETRICS AND GYNECOLOGY | Facility: CLINIC | Age: 21
End: 2021-08-04

## 2021-08-04 NOTE — TELEPHONE ENCOUNTER
C/o of back pain since delivery. Tylenol and ibuprofen isn't helping. Feels the pain is in the middle of her back, where her epidural was. Last took 2 tablets of tylenol this morning and ibuprofen last night.  Lochia is light and pt is pumping. No fever. No dysuria.  Discussed that is often normal to have some back pain after delivery. Encourage patient to take medication on schedule as directed on the bottles, can take tylenol and ibuprofen at the same time if needed. If pain does not improve in 1-2 days, return to clinic for follow-up.  May need UA/culture etc. Pt agreeable to this plan.

## 2021-08-16 ENCOUNTER — OFFICE VISIT (OUTPATIENT)
Dept: OBSTETRICS AND GYNECOLOGY | Facility: CLINIC | Age: 21
End: 2021-08-16

## 2021-08-16 PROCEDURE — 0503F POSTPARTUM CARE VISIT: CPT | Performed by: OBSTETRICS & GYNECOLOGY

## 2021-08-16 NOTE — PROGRESS NOTES
Postpartum visit      Yuridia Irizarry is a 20 y.o.  s/p Vaginal, Spontaneous on 2021 at 39w4d secondary to PROM who presents today for postpartum check.  The patient states she is doing well.  Patient denies postpartum depression.  Menstrual cycles have not resumed.  Bottlefeeding.  Desires OCPs for contraception.  She has not resumed sexual intercourse.  Denies bowel or bladder issues.    PHYSICAL EXAM:    LMP 10/22/2020 (Within Weeks)   Postpartum Depression Screening Questionnaire: 1, no treatment indicated.    IMPRESSION/PLAN: Yuridia Irizarry is a 20 y.o.  s/p Vaginal, Spontaneous on .  Doing well.  - Recovered nicely from her delivery  - Contraception: OCPs, will start at next visit  - RTC 4 weeks for final PP visit  - Patient is planning to return to work at [employer] on .  Discussed that if she needs a work letter, she just needs to call the office.    This document has been electronically signed by Dominique Duenas MD on 2021 11:50 CDT.    This visit has been rescheduled as a phone visit to comply with patient safety concerns in accordance with CDC recommendations.  Total time of discussion was 9 minutes.  The use of a telephone visit has been reviewed with the patient and verbal informed consent has been obtained.

## 2021-09-01 PROCEDURE — 87635 SARS-COV-2 COVID-19 AMP PRB: CPT | Performed by: NURSE PRACTITIONER

## 2021-09-07 ENCOUNTER — POSTPARTUM VISIT (OUTPATIENT)
Dept: OBSTETRICS AND GYNECOLOGY | Facility: CLINIC | Age: 21
End: 2021-09-07

## 2021-09-07 VITALS
SYSTOLIC BLOOD PRESSURE: 110 MMHG | HEIGHT: 61 IN | DIASTOLIC BLOOD PRESSURE: 70 MMHG | WEIGHT: 198 LBS | BODY MASS INDEX: 37.38 KG/M2

## 2021-09-07 DIAGNOSIS — Z12.4 CERVICAL CANCER SCREENING: ICD-10-CM

## 2021-09-07 DIAGNOSIS — Z30.011 ENCOUNTER FOR INITIAL PRESCRIPTION OF CONTRACEPTIVE PILLS: ICD-10-CM

## 2021-09-07 PROBLEM — O99.213 OBESITY AFFECTING PREGNANCY IN THIRD TRIMESTER: Status: RESOLVED | Noted: 2021-01-07 | Resolved: 2021-09-07

## 2021-09-07 PROBLEM — O42.92 FULL-TERM PREMATURE RUPTURE OF MEMBRANES: Status: RESOLVED | Noted: 2021-07-25 | Resolved: 2021-09-07

## 2021-09-07 PROBLEM — Z28.39 MATERNAL VARICELLA, NON-IMMUNE: Status: RESOLVED | Noted: 2021-01-11 | Resolved: 2021-09-07

## 2021-09-07 PROBLEM — O09.899 MATERNAL VARICELLA, NON-IMMUNE: Status: RESOLVED | Noted: 2021-01-11 | Resolved: 2021-09-07

## 2021-09-07 PROBLEM — Z34.03 PRIMIGRAVIDA IN THIRD TRIMESTER: Status: RESOLVED | Noted: 2021-01-07 | Resolved: 2021-09-07

## 2021-09-07 PROCEDURE — 0503F POSTPARTUM CARE VISIT: CPT | Performed by: OBSTETRICS & GYNECOLOGY

## 2021-09-07 RX ORDER — NORETHINDRONE ACETATE AND ETHINYL ESTRADIOL 1MG-20(21)
1 KIT ORAL DAILY
Qty: 28 TABLET | Refills: 12 | OUTPATIENT
Start: 2021-09-07 | End: 2022-03-28

## 2021-09-07 NOTE — PROGRESS NOTES
"Postpartum visit      Yuridia Irizarry is a 20 y.o.  s/p Vaginal, Spontaneous on 2021 at 39w4d secondary to PROM who presents today for postpartum check.  The patient states she is doing well.  Patient denies postpartum depression.  Menstrual cycles have not resumed.  Bottlefeeding.  Desires OCPs for contraception.  She has not resumed sexual intercourse.  Denies bowel or bladder issues.    PHYSICAL EXAM:    /70   Ht 154.9 cm (61\")   Wt 89.8 kg (198 lb)   LMP 10/22/2020 (Within Weeks)   BMI 37.41 kg/m²    Gen: NAD, AAO x3  External Genitalia/Vulva: Anatomy is normal, no significant redness of labia, no discharge on vulvar tissues, [place]'s and [place]tholin's glands are normal, no ulcers, no condylomatous lesions.  Urethral meatus: Normal, no lesions, no prolapse.  Urethra: Normal, no masses, no tenderness with palpation.  Bladder: Normal, no fullness, no masses, no tenderness with palpation.  Vagina: Vaginal tissues are not inflamed, normal color and texture, no significant discharge present.  Pelvic support adequate.  Cervix: Normal, no lesions, no purulent discharge, no cervical motion tenderness.  Uterus: Normal size, shape, and consistency.  Good mobility noted.  Minimal descent noted with good support.  Adnexa: Normal size and shape bilaterally, no palpable mass bilaterally and non-tender bilaterally.  Rectal: Normal, no masses or polyps, confirms bimanual exam, perianal normal, no lesions; GUS deferred.  Postpartum Depression Screening Questionnaire: 0, no treatment indicated.    IMPRESSION/PLAN: Yuridia Irizarry is a 20 y.o.  s/p Vaginal, Spontaneous on .  Doing well.  - Recovered nicely from her delivery  - Contraception: OCPs  - Pap smear today    This document has been electronically signed by Dominique Duenas MD on 2021 13:13 CDT.  " Liver clot in extraction site #26. Advised patient not to dislodge or rinse clot away. If bleeding starts, apply firm pressure with gauze for 20 minutes.  Recommend patient be transported to dental clinic in the morning for further evaluation.

## 2021-09-13 LAB
LAB AP CASE REPORT: NORMAL
PATH INTERP SPEC-IMP: NORMAL

## 2022-01-20 PROCEDURE — U0003 INFECTIOUS AGENT DETECTION BY NUCLEIC ACID (DNA OR RNA); SEVERE ACUTE RESPIRATORY SYNDROME CORONAVIRUS 2 (SARS-COV-2) (CORONAVIRUS DISEASE [COVID-19]), AMPLIFIED PROBE TECHNIQUE, MAKING USE OF HIGH THROUGHPUT TECHNOLOGIES AS DESCRIBED BY CMS-2020-01-R: HCPCS | Performed by: NURSE PRACTITIONER

## 2022-02-24 NOTE — TELEPHONE ENCOUNTER
Lactation follow up call. No answer. Message left.    Libtayo Pregnancy And Lactation Text: This medication is contraindicated in pregnancy and when breast feeding.

## 2022-03-04 PROCEDURE — 87255 GENET VIRUS ISOLATE HSV: CPT | Performed by: NURSE PRACTITIONER

## 2022-04-20 ENCOUNTER — LAB (OUTPATIENT)
Dept: LAB | Facility: HOSPITAL | Age: 22
End: 2022-04-20

## 2022-04-20 ENCOUNTER — INITIAL PRENATAL (OUTPATIENT)
Dept: OBSTETRICS AND GYNECOLOGY | Facility: CLINIC | Age: 22
End: 2022-04-20

## 2022-04-20 VITALS — BODY MASS INDEX: 34.39 KG/M2 | DIASTOLIC BLOOD PRESSURE: 76 MMHG | WEIGHT: 182 LBS | SYSTOLIC BLOOD PRESSURE: 114 MMHG

## 2022-04-20 DIAGNOSIS — O21.9 NAUSEA AND VOMITING DURING PREGNANCY: ICD-10-CM

## 2022-04-20 DIAGNOSIS — Z3A.01 LESS THAN 8 WEEKS GESTATION OF PREGNANCY: ICD-10-CM

## 2022-04-20 DIAGNOSIS — Z78.9 DATE OF LAST MENSTRUAL PERIOD (LMP) UNKNOWN: Primary | ICD-10-CM

## 2022-04-20 DIAGNOSIS — O99.210 OBESITY IN PREGNANCY, ANTEPARTUM: ICD-10-CM

## 2022-04-20 PROBLEM — O99.321 DRUG USE AFFECTING PREGNANCY IN FIRST TRIMESTER: Status: ACTIVE | Noted: 2022-04-20

## 2022-04-20 LAB
ABO GROUP BLD: NORMAL
AMPHET+METHAMPHET UR QL: NEGATIVE
AMPHETAMINES UR QL: NEGATIVE
B-HCG UR QL: POSITIVE
BARBITURATES UR QL SCN: NEGATIVE
BASOPHILS # BLD AUTO: 0.02 10*3/MM3 (ref 0–0.2)
BASOPHILS NFR BLD AUTO: 0.3 % (ref 0–1.5)
BENZODIAZ UR QL SCN: NEGATIVE
BILIRUB UR QL STRIP: NEGATIVE
BLD GP AB SCN SERPL QL: NEGATIVE
BUPRENORPHINE SERPL-MCNC: NEGATIVE NG/ML
CANNABINOIDS SERPL QL: POSITIVE
CLARITY UR: CLEAR
COCAINE UR QL: NEGATIVE
COLOR UR: YELLOW
DEPRECATED RDW RBC AUTO: 43.8 FL (ref 37–54)
EOSINOPHIL # BLD AUTO: 0.05 10*3/MM3 (ref 0–0.4)
EOSINOPHIL NFR BLD AUTO: 0.7 % (ref 0.3–6.2)
ERYTHROCYTE [DISTWIDTH] IN BLOOD BY AUTOMATED COUNT: 14 % (ref 12.3–15.4)
EXPIRATION DATE: ABNORMAL
GLUCOSE UR STRIP-MCNC: NEGATIVE MG/DL
HBV SURFACE AG SERPL QL IA: NORMAL
HCT VFR BLD AUTO: 37 % (ref 34–46.6)
HCV AB SER DONR QL: NORMAL
HGB BLD-MCNC: 11.9 G/DL (ref 12–15.9)
HGB UR QL STRIP.AUTO: NEGATIVE
HIV1+2 AB SER QL: NORMAL
IMM GRANULOCYTES # BLD AUTO: 0.02 10*3/MM3 (ref 0–0.05)
IMM GRANULOCYTES NFR BLD AUTO: 0.3 % (ref 0–0.5)
INTERNAL NEGATIVE CONTROL: NEGATIVE
INTERNAL POSITIVE CONTROL: POSITIVE
KETONES UR QL STRIP: NEGATIVE
LEUKOCYTE ESTERASE UR QL STRIP.AUTO: ABNORMAL
LYMPHOCYTES # BLD AUTO: 2.04 10*3/MM3 (ref 0.7–3.1)
LYMPHOCYTES NFR BLD AUTO: 28.5 % (ref 19.6–45.3)
Lab: ABNORMAL
Lab: NORMAL
MCH RBC QN AUTO: 27.3 PG (ref 26.6–33)
MCHC RBC AUTO-ENTMCNC: 32.2 G/DL (ref 31.5–35.7)
MCV RBC AUTO: 84.9 FL (ref 79–97)
METHADONE UR QL SCN: NEGATIVE
MONOCYTES # BLD AUTO: 0.46 10*3/MM3 (ref 0.1–0.9)
MONOCYTES NFR BLD AUTO: 6.4 % (ref 5–12)
NEUTROPHILS NFR BLD AUTO: 4.57 10*3/MM3 (ref 1.7–7)
NEUTROPHILS NFR BLD AUTO: 63.8 % (ref 42.7–76)
NITRITE UR QL STRIP: NEGATIVE
NRBC BLD AUTO-RTO: 0 /100 WBC (ref 0–0.2)
OPIATES UR QL: NEGATIVE
OXYCODONE UR QL SCN: NEGATIVE
PCP UR QL SCN: NEGATIVE
PH UR STRIP.AUTO: 7 [PH] (ref 5–8)
PLATELET # BLD AUTO: 320 10*3/MM3 (ref 140–450)
PMV BLD AUTO: 10.5 FL (ref 6–12)
PROPOXYPH UR QL: NEGATIVE
PROT UR QL STRIP: NEGATIVE
RBC # BLD AUTO: 4.36 10*6/MM3 (ref 3.77–5.28)
RH BLD: POSITIVE
RPR SER QL: NORMAL
SP GR UR STRIP: 1.02 (ref 1–1.03)
TRICYCLICS UR QL SCN: NEGATIVE
UROBILINOGEN UR QL STRIP: ABNORMAL
WBC NRBC COR # BLD: 7.16 10*3/MM3 (ref 3.4–10.8)

## 2022-04-20 PROCEDURE — 80306 DRUG TEST PRSMV INSTRMNT: CPT | Performed by: NURSE PRACTITIONER

## 2022-04-20 PROCEDURE — 87591 N.GONORRHOEAE DNA AMP PROB: CPT | Performed by: NURSE PRACTITIONER

## 2022-04-20 PROCEDURE — 86803 HEPATITIS C AB TEST: CPT | Performed by: NURSE PRACTITIONER

## 2022-04-20 PROCEDURE — 80081 OBSTETRIC PANEL INC HIV TSTG: CPT | Performed by: NURSE PRACTITIONER

## 2022-04-20 PROCEDURE — 81025 URINE PREGNANCY TEST: CPT | Performed by: NURSE PRACTITIONER

## 2022-04-20 PROCEDURE — 81003 URINALYSIS AUTO W/O SCOPE: CPT | Performed by: NURSE PRACTITIONER

## 2022-04-20 PROCEDURE — 87661 TRICHOMONAS VAGINALIS AMPLIF: CPT | Performed by: NURSE PRACTITIONER

## 2022-04-20 PROCEDURE — 36415 COLL VENOUS BLD VENIPUNCTURE: CPT

## 2022-04-20 PROCEDURE — G0480 DRUG TEST DEF 1-7 CLASSES: HCPCS | Performed by: NURSE PRACTITIONER

## 2022-04-20 PROCEDURE — 87086 URINE CULTURE/COLONY COUNT: CPT | Performed by: NURSE PRACTITIONER

## 2022-04-20 PROCEDURE — 87491 CHLMYD TRACH DNA AMP PROBE: CPT | Performed by: NURSE PRACTITIONER

## 2022-04-20 RX ORDER — DIPHENHYDRAMINE HYDROCHLORIDE 25 MG/1
25 CAPSULE ORAL DAILY
Qty: 30 TABLET | Refills: 2 | Status: SHIPPED | OUTPATIENT
Start: 2022-04-20 | End: 2022-04-28

## 2022-04-20 RX ORDER — PNV NO.95/FERROUS FUM/FOLIC AC 28MG-0.8MG
1 TABLET ORAL DAILY
Qty: 30 TABLET | Refills: 11 | Status: SHIPPED | OUTPATIENT
Start: 2022-04-20 | End: 2023-03-22

## 2022-04-20 NOTE — PROGRESS NOTES
I spent approximately 40 minutes with the patient acquiring the health and history intake and discussing topics related to healthy lifestyle. She is accompanied by her  Her UPT in office today is positive. Her LMP was approximately 03/08/2022. This is her 2nd pregnancy. She had a vaginal delivery with her daughter at 39 weeks and 4 days gestation.   She has history of asthma and migraines. She denies any other health problems.  A newob bag is given. The 1st trimester teaching was done with the patient. We discussed a healthy diet and exercise and what is recommended. She is taking prenatal vitamins. We also discussed Listeriosis and Toxoplasmosis. She does not eat fish. I informed patient not to be in hot tubs, saunas, or tanning beds. We discussed that spotting may occur after intercourse which is common, but if heavy bleeding like a period occurs to [facility] if clinic is closed.  I encouraged her to make an appointment with the dentist if she has not had a dental exam and cleaning in the last 6 months.  I instructed the patient that alcohol, illicit drug use, and tobacco smoking are not recommended in pregnancy. She is currently still smoking an electronic cigarette; she plans to quit.  She plans to breastfeed. She  her daughter.  She filled out the depression screening questionnaire and scored 5. I encouraged the patient to get the TDAP vaccine in the 3rd trimester.  I discussed with the patient that a pediatrician needs to be chosen prior to delivery for the infant to have an appointment scheduled before leaving the hospital.  I discussed lab tests will be done today. She is unsure when she had a pap smear. All questions were answered at this time. She is scheduled for an ultrasound and to see ROSALES Spence on 05/03/2022.

## 2022-04-21 PROBLEM — Z28.39 RUBELLA NON-IMMUNE STATUS, ANTEPARTUM: Status: ACTIVE | Noted: 2022-04-21

## 2022-04-21 PROBLEM — O09.899 RUBELLA NON-IMMUNE STATUS, ANTEPARTUM: Status: ACTIVE | Noted: 2022-04-21

## 2022-04-21 LAB
BACTERIA SPEC AEROBE CULT: NO GROWTH
C TRACH RRNA CVX QL NAA+PROBE: NEGATIVE
N GONORRHOEA RRNA SPEC QL NAA+PROBE: NEGATIVE
RUBV IGG SERPL IA-ACNC: <0.9 INDEX
TRICHOMONAS VAGINALIS PCR: NEGATIVE

## 2022-04-27 LAB
CANNABINOIDS UR QL CFM: NORMAL
CARBOXYTHC/CREAT UR: 125 NG/MG CREAT
LEVEL OF DETECTION:: NORMAL

## 2022-04-28 ENCOUNTER — TELEPHONE (OUTPATIENT)
Dept: OBSTETRICS AND GYNECOLOGY | Facility: CLINIC | Age: 22
End: 2022-04-28

## 2022-04-28 RX ORDER — DIPHENHYDRAMINE HYDROCHLORIDE 25 MG/1
25 CAPSULE ORAL 2 TIMES DAILY
Qty: 60 TABLET | Refills: 3 | OUTPATIENT
Start: 2022-04-28 | End: 2022-05-16

## 2022-04-28 RX ORDER — PROMETHAZINE HYDROCHLORIDE 12.5 MG/1
12.5 TABLET ORAL EVERY 6 HOURS PRN
Qty: 30 TABLET | Refills: 2 | Status: SHIPPED | OUTPATIENT
Start: 2022-04-28 | End: 2022-07-19

## 2022-04-28 NOTE — TELEPHONE ENCOUNTER
Patient called requesting FMLA for nausea. She said the prescription she received is not working and she would like something different sent into [facility] and also to be put on FMLA. I explained how the FMLA paperwork and fee work and told her I would send a message on her behalf.

## 2022-04-28 NOTE — TELEPHONE ENCOUNTER
Attempted to contact patient at this time there was no answer left voicemail for patient to return call

## 2022-04-28 NOTE — TELEPHONE ENCOUNTER
Spoke to patient letting her know that Ericka was sending her in a new rx for vitamin b6 to take twice a day and phenergan. That we cannot start her on FMLA for nausea at this time since she is early in pregnancy and has not yet seen a provider and has only tried 1 med for nausea.

## 2022-05-03 ENCOUNTER — LAB (OUTPATIENT)
Dept: LAB | Facility: HOSPITAL | Age: 22
End: 2022-05-03

## 2022-05-03 ENCOUNTER — INITIAL PRENATAL (OUTPATIENT)
Dept: OBSTETRICS AND GYNECOLOGY | Facility: CLINIC | Age: 22
End: 2022-05-03

## 2022-05-03 VITALS — DIASTOLIC BLOOD PRESSURE: 70 MMHG | SYSTOLIC BLOOD PRESSURE: 112 MMHG | BODY MASS INDEX: 36.28 KG/M2 | WEIGHT: 192 LBS

## 2022-05-03 DIAGNOSIS — Z3A.01 LESS THAN 8 WEEKS GESTATION OF PREGNANCY: ICD-10-CM

## 2022-05-03 DIAGNOSIS — Z78.9 ELECTRONIC CIGARETTE USE: ICD-10-CM

## 2022-05-03 DIAGNOSIS — Z28.39 RUBELLA NON-IMMUNE STATUS, ANTEPARTUM: ICD-10-CM

## 2022-05-03 DIAGNOSIS — O99.321 DRUG USE AFFECTING PREGNANCY IN FIRST TRIMESTER: Primary | ICD-10-CM

## 2022-05-03 DIAGNOSIS — O99.210 OBESITY IN PREGNANCY, ANTEPARTUM: ICD-10-CM

## 2022-05-03 DIAGNOSIS — O09.899 RUBELLA NON-IMMUNE STATUS, ANTEPARTUM: ICD-10-CM

## 2022-05-03 DIAGNOSIS — O09.41 HIGH RISK MULTIGRAVIDA IN FIRST TRIMESTER: ICD-10-CM

## 2022-05-03 DIAGNOSIS — F12.10 MARIJUANA ABUSE: ICD-10-CM

## 2022-05-03 LAB — GLUCOSE 1H P 100 G GLC PO SERPL-MCNC: 118 MG/DL (ref 65–139)

## 2022-05-03 PROCEDURE — 36415 COLL VENOUS BLD VENIPUNCTURE: CPT

## 2022-05-03 PROCEDURE — 0501F PRENATAL FLOW SHEET: CPT | Performed by: NURSE PRACTITIONER

## 2022-05-03 PROCEDURE — 82950 GLUCOSE TEST: CPT

## 2022-05-03 RX ORDER — ONDANSETRON HYDROCHLORIDE 8 MG/1
8 TABLET, FILM COATED ORAL EVERY 8 HOURS PRN
Qty: 20 TABLET | Refills: 2 | OUTPATIENT
Start: 2022-05-03 | End: 2022-10-12

## 2022-05-03 NOTE — PROGRESS NOTES
[facility]  Obstetrics  Date of Service: 2022    CHIEF COMPLAINT:  New prenatal visit    HISTORY OF PRESENT ILLNESS:  Yuridia Irizarry is a 21 y.o. y/o  at 8w0d by LMP (Patient's last menstrual period was 2022 (approximate).).  This was an unplanned pregnancy and the patient is supported by her partner.  Reports nausea with vomiting.  Reports breast tenderness.  She denies any vaginal bleeding.  She has started taking a prenatal vitamin.    REVIEW OF SYSTEMS  Review of Systems   Constitutional: Negative for activity change, appetite change, chills, diaphoresis, fatigue, fever, unexpected weight gain and unexpected weight loss.   Respiratory: Negative for chest tightness and shortness of breath.    Cardiovascular: Negative for chest pain and palpitations.   Gastrointestinal: Positive for nausea and vomiting. Negative for abdominal distention, abdominal pain, constipation and diarrhea.   Endocrine: Negative.    Genitourinary: Negative for amenorrhea, breast discharge, breast lump, breast pain, decreased libido, dyspareunia, dysuria, menstrual problem, pelvic pain, vaginal bleeding, vaginal discharge and vaginal pain.   Musculoskeletal: Negative for myalgias.   Skin: Negative for color change, dry skin and skin lesions.   Neurological: Negative for light-headedness and headache.   Psychiatric/Behavioral: Negative for agitation, dysphoric mood, sleep disturbance, depressed mood and stress. The patient is not nervous/anxious.         EPDS- 5       PRENATAL RISK FACTORS   Problems (from 21 to present)     Problem Noted Resolved    High risk multigravida in first trimester 5/3/2022 by Aida Ames, APRN No    Marijuana abuse 5/3/2022 by Aida Ames, APRN No    Electronic cigarette use 5/3/2022 by Aida Ames, APRN No    Rubella non-immune status, antepartum 2022 by Aida Ames, APRN No    Drug use affecting pregnancy in first trimester 2022  by Aida Ames APRN No    Overview Signed 2022 10:09 AM by Aida Ames APRN     [facility] @ NOB                 DATING CRITERIA:  LMP (3/8/22) -- JESSICA 21  1TUS (5/3/22 at 7w5d) -- JESSICA 12/15/22    OBSTETRIC HISTORY:  OB History    Para Term  AB Living   2 1 1     1   SAB IAB Ectopic Molar Multiple Live Births           0 1      # Outcome Date GA Lbr Erasmo/2nd Weight Sex Delivery Anes PTL Lv   2 Current            1 Term 21 39w4d 18:56 / 02:45 2730 g (6 lb 0.3 oz) F Vag-Spont EPI N KALEE     GYN HISTORY:  Denies h/o sexually transmitted infections/pelvic inflammatory disease  Denies h/o abnormal pap smears  Last pap smear:   Last Completed Pap Smear          PAP SMEAR (Every 3 Years) Next due on 2021  Liquid-based Pap Smear, Screening              Denies h/o gynecologic surgeries, including biopsies of the cervix    PAST MEDICAL HISTORY:  Past Medical History:   Diagnosis Date   • Asthma    • Injury of back    • Migraine      PAST SURGICAL HISTORY:  Past Surgical History:   Procedure Laterality Date   • TONSILLECTOMY       FAMILY HISTORY:  Family History   Problem Relation Age of Onset   • Seizures Father    • Diabetes Mother    • Arthritis Mother    • No Known Problems Sister    • Breast cancer Paternal Grandmother    • Lung disease Paternal Grandfather    • Asthma Sister      SOCIAL HISTORY:  Social History     Socioeconomic History   • Marital status:    • Highest education level: High school graduate   Tobacco Use   • Smoking status: Never Smoker   • Smokeless tobacco: Never Used   Vaping Use   • Vaping Use: Never used   Substance and Sexual Activity   • Alcohol use: Not Currently   • Drug use: Never   • Sexual activity: Yes     Partners: Male     GENETIC SCREENING:  Age >36 yo as of JESSICA: no  Thalassemia: no  NTD: no  CHD: no  Down Syndrome/MR/Fragile X/Autism: no  Ashkenazi [employer] with Ga-Sachs, [place], familial dysautonomia: no  Sickle cell disease or  trait: no  Hemophilia: no  Muscular dystrophy: no  Cystic fibrosis: no  Andrea's chorea: no  Birth defects: no  Genetic/chromosomal disorders: no    INFECTION HISTORY:  TB exposure: no  HSV: no  Illness since LMP: no  Prior GBS infected child: no  STIs: no    ALLERGIES:  Allergies   Allergen Reactions   • Rocephin [Ceftriaxone] Rash       MEDICATIONS:  Prior to Admission medications    Medication Sig Start Date End Date Taking? Authorizing Provider   doxylamine (UNISOM) 25 MG tablet Take 1 tablet by mouth At Night As Needed for Nausea. 4/20/22   Sandra Kaufman APRN   ondansetron (Zofran) 8 MG tablet Take 1 tablet by mouth Every 8 (Eight) Hours As Needed for Nausea or Vomiting. 5/3/22   Aida Ames APRN   Prenatal Vit-Fe Fumarate-FA (prenatal vitamin 28-0.8) 28-0.8 MG tablet tablet Take 1 tablet by mouth Daily. 4/20/22   Aida Ames APRN   promethazine (PHENERGAN) 12.5 MG tablet Take 1 tablet by mouth Every 6 (Six) Hours As Needed for Nausea or Vomiting. 4/28/22   Priyanka Cerna APRN   vitamin B-6 (PYRIDOXINE) 25 MG tablet Take 1 tablet by mouth 2 (Two) Times a Day. 4/28/22   Priyanka Cerna APRN   norethindrone-ethinyl estradiol FE (Junel FE 1/20) 1-20 MG-MCG per tablet Take 1 tablet by mouth Daily. 9/7/21 3/28/22  Dominique Duenas MD       PHYSICAL EXAM:   /70   Wt 87.1 kg (192 lb)   LMP 03/08/2022 (Approximate)   BMI 36.28 kg/m²   General: Alert, healthy, no distress, well nourished and well developed.  Neurologic: Alert, oriented to person, place, and time.  Gait normal.  Cranial nerves II-XII grossly intact.  HEENT: Normocephalic, atraumatic.  Extraocular muscles intact, pupils equal and reactive x2.    Teeth: Normal hygiene.  Neck: Supple, no adenopathy, thyroid normal size, non-tender, without nodularity, trachea midline.  Breasts: Deferred  Lungs: Normal respiratory effort.  Clear to auscultation bilaterally.  No wheezes, rhonci, or rales.  Heart:  Regular rate and rhythm.  No murmer, rub or gallop.  Abdomen: Soft, non-tender, non-distended,no masses, no hepatosplenomegaly, no hernia.  Skin: No rash, no lesions.  Extremities: No cyanosis, clubbing or edema.  PELVIC EXAM:  Deferred    IMPRESSION:  Yuridia Irizarry is a 21 y.o.  at 8w0d for a new prenatal visit.    PLAN:  1.  IUP at 8w0d  - Options counseling performed and patient desires continuation of pregnancy to term   - Prenatal labs ordered  - Genetic testing, including cystic fibrosis, was discussed and patient is considering  - Continue prenatal vitamins  - Weight gain counseling performed.   - Pregravid BMI >30: Recommend 11-20 lb  - Return to clinic in 4 weeks for return prenatal visit  - Reviewed COVID-19 visitation policy  - Reviewed COVID-19 precautions     Diagnosis Plan   1. Drug use affecting pregnancy in first trimester     2. Rubella non-immune status, antepartum     3. High risk multigravida in first trimester     4. Marijuana abuse     5. Less than 8 weeks gestation of pregnancy     6. Electronic cigarette use       Aida Ames, APRN  5/3/2022  14:29 CDT

## 2022-06-08 ENCOUNTER — LAB (OUTPATIENT)
Dept: LAB | Facility: HOSPITAL | Age: 22
End: 2022-06-08

## 2022-06-08 ENCOUNTER — ROUTINE PRENATAL (OUTPATIENT)
Dept: OBSTETRICS AND GYNECOLOGY | Facility: CLINIC | Age: 22
End: 2022-06-08

## 2022-06-08 VITALS — WEIGHT: 187 LBS | DIASTOLIC BLOOD PRESSURE: 56 MMHG | BODY MASS INDEX: 35.33 KG/M2 | SYSTOLIC BLOOD PRESSURE: 98 MMHG

## 2022-06-08 DIAGNOSIS — Z78.9 ELECTRONIC CIGARETTE USE: ICD-10-CM

## 2022-06-08 DIAGNOSIS — Z13.79 ENCOUNTER FOR OTHER SCREENING FOR GENETIC AND CHROMOSOMAL ANOMALIES: ICD-10-CM

## 2022-06-08 DIAGNOSIS — Z3A.13 13 WEEKS GESTATION OF PREGNANCY: Primary | ICD-10-CM

## 2022-06-08 DIAGNOSIS — O09.41 HIGH RISK MULTIGRAVIDA IN FIRST TRIMESTER: ICD-10-CM

## 2022-06-08 DIAGNOSIS — F12.10 MARIJUANA ABUSE: ICD-10-CM

## 2022-06-08 DIAGNOSIS — O21.9 NAUSEA AND VOMITING IN PREGNANCY: ICD-10-CM

## 2022-06-08 DIAGNOSIS — Z36.89 ENCOUNTER FOR FETAL ANATOMIC SURVEY: ICD-10-CM

## 2022-06-08 DIAGNOSIS — O09.899 RUBELLA NON-IMMUNE STATUS, ANTEPARTUM: ICD-10-CM

## 2022-06-08 DIAGNOSIS — Z13.0 SCREENING FOR IRON DEFICIENCY ANEMIA: ICD-10-CM

## 2022-06-08 DIAGNOSIS — O99.321 DRUG USE AFFECTING PREGNANCY IN FIRST TRIMESTER: ICD-10-CM

## 2022-06-08 DIAGNOSIS — Z28.39 RUBELLA NON-IMMUNE STATUS, ANTEPARTUM: ICD-10-CM

## 2022-06-08 LAB
DEPRECATED RDW RBC AUTO: 43.3 FL (ref 37–54)
ERYTHROCYTE [DISTWIDTH] IN BLOOD BY AUTOMATED COUNT: 14.1 % (ref 12.3–15.4)
HCT VFR BLD AUTO: 31.3 % (ref 34–46.6)
HGB BLD-MCNC: 10.4 G/DL (ref 12–15.9)
MCH RBC QN AUTO: 28.5 PG (ref 26.6–33)
MCHC RBC AUTO-ENTMCNC: 33.2 G/DL (ref 31.5–35.7)
MCV RBC AUTO: 85.8 FL (ref 79–97)
PLATELET # BLD AUTO: 254 10*3/MM3 (ref 140–450)
PMV BLD AUTO: 10.3 FL (ref 6–12)
RBC # BLD AUTO: 3.65 10*6/MM3 (ref 3.77–5.28)
WBC NRBC COR # BLD: 7.89 10*3/MM3 (ref 3.4–10.8)

## 2022-06-08 PROCEDURE — 36415 COLL VENOUS BLD VENIPUNCTURE: CPT

## 2022-06-08 PROCEDURE — 85027 COMPLETE CBC AUTOMATED: CPT

## 2022-06-08 PROCEDURE — 0502F SUBSEQUENT PRENATAL CARE: CPT | Performed by: NURSE PRACTITIONER

## 2022-06-08 RX ORDER — DOCUSATE SODIUM 100 MG/1
100 CAPSULE, LIQUID FILLED ORAL 2 TIMES DAILY
Qty: 60 CAPSULE | Refills: 3 | Status: SHIPPED | OUTPATIENT
Start: 2022-06-08 | End: 2023-01-12

## 2022-06-08 NOTE — PROGRESS NOTES
CC: Prenatal visit    Yuridia Irizarry is a 21 y.o.  at 13w1d.  Doing well.  Denies contractions, LOF, or VB. No fetal movement yet. Desires NIPS, declines carrier screening. C/o of nausea; has phenergan and zofran rx at home. Has not wanted to take both as she becomes drowsy. Counseled pt that she can just take zofran as it should not make her drowsy. Will given colace rx.     BP 98/56   Wt 84.8 kg (187 lb)   LMP 2022 (Approximate)   BMI 35.33 kg/m²   SVE: Deferred     Fetal Heart Rate: 150us     Problems (from 21 to present)     Problem Noted Resolved    Nausea and vomiting in pregnancy 2022 by Priyanka Cerna APRN No    High risk multigravida in first trimester 5/3/2022 by Aida Ames APRN No    Marijuana abuse 5/3/2022 by Aida Ames APRN No    Electronic cigarette use 5/3/2022 by Aida Ames APRN No    Rubella non-immune status, antepartum 2022 by Aida Ames APRN No    Drug use affecting pregnancy in first trimester 2022 by Aida Ames APRN No    Overview Signed 2022 10:09 AM by Aida Ames APRN     [facility] @ [facility]                 A/P: Yuridia Irizarry is a 21 y.o.  at 13w1d.  - Anatomy US in 6 weeks   - NIPS today  - RTC in 6 weeks     Diagnosis Plan   1. 13 weeks gestation of pregnancy     2. High risk multigravida in first trimester     3. Marijuana abuse     4. Rubella non-immune status, antepartum     5. Drug use affecting pregnancy in first trimester     6. Electronic cigarette use     7. Encounter for fetal anatomic survey  US Ob 14 + Weeks Single or First Gestation   8. Encounter for other screening for genetic and chromosomal anomalies  INVITAE NIPS   9. Nausea and vomiting in pregnancy       ROSALES Joyner  2022  14:23 CDT

## 2022-06-09 ENCOUNTER — TELEPHONE (OUTPATIENT)
Dept: OBSTETRICS AND GYNECOLOGY | Facility: CLINIC | Age: 22
End: 2022-06-09

## 2022-06-09 PROBLEM — Z86.19 HISTORY OF COLD SORES: Status: ACTIVE | Noted: 2022-06-09

## 2022-06-09 RX ORDER — FERROUS SULFATE 325(65) MG
325 TABLET ORAL
Qty: 30 TABLET | Refills: 6 | Status: SHIPPED | OUTPATIENT
Start: 2022-06-09 | End: 2022-09-19 | Stop reason: DRUGHIGH

## 2022-06-13 ENCOUNTER — TELEPHONE (OUTPATIENT)
Dept: OBSTETRICS AND GYNECOLOGY | Facility: CLINIC | Age: 22
End: 2022-06-13

## 2022-06-13 NOTE — TELEPHONE ENCOUNTER
I called this patient and informed her that she is anemic and iron has been sent to her pharmacy.  She is to take it once a day.

## 2022-06-13 NOTE — TELEPHONE ENCOUNTER
Yuridia called back and didn't want the results of the test because she is having a gender reveal party.

## 2022-06-13 NOTE — TELEPHONE ENCOUNTER
----- Message from ROSALES Agrawal sent at 6/9/2022  9:12 AM CDT -----  She is mildy anemic, I have sent ferrous sulfate once a day to [facility]. Please let her know.

## 2022-06-16 ENCOUNTER — LAB (OUTPATIENT)
Dept: LAB | Facility: HOSPITAL | Age: 22
End: 2022-06-16

## 2022-06-16 ENCOUNTER — TELEPHONE (OUTPATIENT)
Dept: OBSTETRICS AND GYNECOLOGY | Facility: CLINIC | Age: 22
End: 2022-06-16

## 2022-06-16 DIAGNOSIS — O99.891 BACK PAIN AFFECTING PREGNANCY IN FIRST TRIMESTER: ICD-10-CM

## 2022-06-16 DIAGNOSIS — O99.891 BACK PAIN AFFECTING PREGNANCY IN FIRST TRIMESTER: Primary | ICD-10-CM

## 2022-06-16 DIAGNOSIS — M54.9 BACK PAIN AFFECTING PREGNANCY IN FIRST TRIMESTER: ICD-10-CM

## 2022-06-16 DIAGNOSIS — M54.9 BACK PAIN AFFECTING PREGNANCY IN FIRST TRIMESTER: Primary | ICD-10-CM

## 2022-06-16 LAB
CANDIDA ALBICANS: NEGATIVE
GARDNERELLA VAGINALIS: NEGATIVE
T VAGINALIS DNA VAG QL PROBE+SIG AMP: NEGATIVE

## 2022-06-16 PROCEDURE — 87086 URINE CULTURE/COLONY COUNT: CPT | Performed by: OBSTETRICS & GYNECOLOGY

## 2022-06-16 PROCEDURE — 87480 CANDIDA DNA DIR PROBE: CPT

## 2022-06-16 PROCEDURE — 87510 GARDNER VAG DNA DIR PROBE: CPT

## 2022-06-16 PROCEDURE — 81003 URINALYSIS AUTO W/O SCOPE: CPT

## 2022-06-16 PROCEDURE — 87660 TRICHOMONAS VAGIN DIR PROBE: CPT

## 2022-06-16 NOTE — TELEPHONE ENCOUNTER
PATIENT CALLED STATED SHE WAS HAVING BACK PAIN. I TALKED TO DR. JORDAN AND SHE SAID THAT SHE COULD TAKE TYLENOL AND WARM BATHS. ALSO, SHE CAN COME IN AND LEAVE A URINE SAMPLE SO IT CAN BE TESTED FOR A UTI. PATIENT WANTED TO BE TAKEN OFF WORK. I TOLD HER WE DON'T DO THAT AT 14 WEEKS BUT IF SHE WANTED TO GET PAPERWORK FROM HER JOB THEN I COULD GIVE IT TO EHSAN SALDIVAR MA TO SEE IF SHE COULD FILL IT OUT. PATIENT VERBALIZED UNDERSTANDING.

## 2022-06-17 ENCOUNTER — TELEPHONE (OUTPATIENT)
Dept: OBSTETRICS AND GYNECOLOGY | Facility: CLINIC | Age: 22
End: 2022-06-17

## 2022-06-17 LAB
BACTERIA SPEC AEROBE CULT: NORMAL
BILIRUB UR QL STRIP: NEGATIVE
CLARITY UR: CLEAR
COLOR UR: YELLOW
GLUCOSE UR STRIP-MCNC: NEGATIVE MG/DL
HGB UR QL STRIP.AUTO: NEGATIVE
KETONES UR QL STRIP: NEGATIVE
LEUKOCYTE ESTERASE UR QL STRIP.AUTO: NEGATIVE
NITRITE UR QL STRIP: NEGATIVE
PH UR STRIP.AUTO: 6.5 [PH] (ref 5–8)
PROT UR QL STRIP: ABNORMAL
SP GR UR STRIP: >=1.03 (ref 1–1.03)
UROBILINOGEN UR QL STRIP: ABNORMAL

## 2022-06-17 NOTE — TELEPHONE ENCOUNTER
----- Message from Dominique Duenas MD sent at 6/17/2022  7:22 AM CDT -----  Please let her know no UTI.

## 2022-06-17 NOTE — TELEPHONE ENCOUNTER
Called and spoke with patient. Let her know urinalysis and vag panel were both negative for infection. Patient verbalized understanding and had no questions or concerns.

## 2022-06-23 ENCOUNTER — ROUTINE PRENATAL (OUTPATIENT)
Dept: OBSTETRICS AND GYNECOLOGY | Facility: CLINIC | Age: 22
End: 2022-06-23

## 2022-06-23 VITALS — SYSTOLIC BLOOD PRESSURE: 110 MMHG | WEIGHT: 184 LBS | DIASTOLIC BLOOD PRESSURE: 62 MMHG | BODY MASS INDEX: 34.77 KG/M2

## 2022-06-23 DIAGNOSIS — O26.892 LOW BACK PAIN DURING PREGNANCY, SECOND TRIMESTER: ICD-10-CM

## 2022-06-23 DIAGNOSIS — Z78.9 ELECTRONIC CIGARETTE USE: ICD-10-CM

## 2022-06-23 DIAGNOSIS — O21.9 NAUSEA AND VOMITING IN PREGNANCY: ICD-10-CM

## 2022-06-23 DIAGNOSIS — O09.42 HIGH RISK MULTIGRAVIDA IN SECOND TRIMESTER: ICD-10-CM

## 2022-06-23 DIAGNOSIS — O09.899 RUBELLA NON-IMMUNE STATUS, ANTEPARTUM: ICD-10-CM

## 2022-06-23 DIAGNOSIS — Z86.19 HISTORY OF COLD SORES: ICD-10-CM

## 2022-06-23 DIAGNOSIS — Z28.39 RUBELLA NON-IMMUNE STATUS, ANTEPARTUM: ICD-10-CM

## 2022-06-23 DIAGNOSIS — Z3A.15 15 WEEKS GESTATION OF PREGNANCY: Primary | ICD-10-CM

## 2022-06-23 DIAGNOSIS — O99.322 DRUG USE AFFECTING PREGNANCY IN SECOND TRIMESTER: ICD-10-CM

## 2022-06-23 DIAGNOSIS — F12.10 MARIJUANA ABUSE: ICD-10-CM

## 2022-06-23 DIAGNOSIS — M54.50 LOW BACK PAIN DURING PREGNANCY, SECOND TRIMESTER: ICD-10-CM

## 2022-06-23 PROCEDURE — 0502F SUBSEQUENT PRENATAL CARE: CPT | Performed by: NURSE PRACTITIONER

## 2022-06-23 NOTE — PROGRESS NOTES
CC: Prenatal visit    Yuridia Irizarry is a 21 y.o.  at 15w2d.  Doing well.  Patient reports back pain for the past week since she lifted her toddler.  Denies contractions, LOF, or VB.  Reports good FM.    /62   Wt 83.5 kg (184 lb)   LMP 2022 (Approximate)   BMI 34.77 kg/m²              Problems (from 21 to present)     Problem Noted Resolved    History of cold sores 2022 by Priyanka Cerna APRN No    Overview Signed 2022  9:10 AM by Priyanka Cerna APRN     +HSV culture. Needs prophylaxis at 36 weeks.            Nausea and vomiting in pregnancy 2022 by Priyanka Cerna APRN No    High risk multigravida in second trimester 5/3/2022 by Aida Ames APRN No    Marijuana abuse 5/3/2022 by Aida Ames APRN No    Electronic cigarette use 5/3/2022 by Aida Ames APRN No    Rubella non-immune status, antepartum 2022 by Aida Ames APRN No    Drug use affecting pregnancy in second trimester 2022 by Aida Ames APRN No    Overview Signed 2022 10:09 AM by Aida Ames APRN     [facility] @ [facility]                 A/P: Yuridia Irizarry is a 21 y.o.  at 15w2d.  - RTC in 4 weeks for KACY appt and fetal anatomy scan      Diagnosis Plan   1. 15 weeks gestation of pregnancy     2. High risk multigravida in second trimester     3. Nausea and vomiting in pregnancy     4. Marijuana abuse     5. Electronic cigarette use     6. Rubella non-immune status, antepartum     7. Drug use affecting pregnancy in second trimester     8. History of cold sores     9. Low back pain during pregnancy, second trimester  Ambulatory Referral to Physical Therapy Evaluate and treat    Discussed comfort measures   Referral placed for PT  Work restriction letter printed for patient. ROSALES Valverde  2022  13:15 CDT

## 2022-07-14 ENCOUNTER — HOSPITAL ENCOUNTER (OUTPATIENT)
Dept: PHYSICAL THERAPY | Facility: HOSPITAL | Age: 22
Setting detail: THERAPIES SERIES
Discharge: HOME | End: 2022-07-14

## 2022-07-14 DIAGNOSIS — M54.9 BACK PAIN IN PREGNANCY: Primary | ICD-10-CM

## 2022-07-14 DIAGNOSIS — O99.891 BACK PAIN IN PREGNANCY: Primary | ICD-10-CM

## 2022-07-14 PROCEDURE — 97162 PT EVAL MOD COMPLEX 30 MIN: CPT | Performed by: PHYSICAL THERAPIST

## 2022-07-14 PROCEDURE — 97140 MANUAL THERAPY 1/> REGIONS: CPT | Performed by: PHYSICAL THERAPIST

## 2022-07-14 NOTE — THERAPY EVALUATION
Outpatient Physical Therapy Pelvic Health Initial Evaluation  [facility]     Patient Name: Yuridia Irizarry  : 2000  MRN: [id number]  Today's Date: 2022        Visit Date: 2022   Visit Number:   Recheck: 22  Insurance: Diane RAMSAY      Patient Active Problem List   Diagnosis   • Intermittent asthma without complication   • Drug use affecting pregnancy in second trimester   • Rubella non-immune status, antepartum   • High risk multigravida in second trimester   • Marijuana abuse   • Electronic cigarette use   • Nausea and vomiting in pregnancy   • History of cold sores        Past Medical History:   Diagnosis Date   • Asthma    • Injury of back    • Migraine         Past Surgical History:   Procedure Laterality Date   • TONSILLECTOMY       Allergies   Allergen Reactions   • Rocephin [Ceftriaxone] Rash         Visit Dx:    ICD-10-CM ICD-9-CM   1. Back pain in pregnancy  O99.891 646.80    M54.9 724.5            Pelvic Health     Row Name 22 1600             Pregnancy Questions    Number of Pregnancies 2  -SW      Number of Miscarriages 0  -SW      Has the patient had an ? No  -SW      Number of Children 1  -SW      How many weeks pregnant are you? 18 weeks  -SW      Type of Previous Deliveries Vaginal  -SW      Due Date 22  -SW              Pain Assessment    Pain Assessment 0-10  -SW      Pain Score 6  highest pain 8/10 in past 24 hours  -SW      Post Pain Score 4  -SW      Pain Location Back  -SW            User Key  (r) = Recorded By, (t) = Taken By, (c) = Cosigned By    Initials Name Provider Type    SW Tamara Maurer, PT DPT Physical Therapist               PT Ortho     Row Name 22 1600       Subjective Comments    Subjective Comments Pain started about 1 month ago.  Always had LB problems due to falling from rope swing when she was little.  Getting up off mattress with child in arms and back popped in middle of back. she was standing up  from seated position when she heard the pop. Works at [email or web address].  Works 40 hrs per week.  Missed a lot due to pain.  Can't lift above 10 lbs, no bending and lifting over head.  -SW       Subjective Pain    Able to rate subjective pain? yes  -    Pre-Treatment Pain Level 6  -SW    Post-Treatment Pain Level 4  -SW    Subjective Pain Comment sore but better.  -SW       Posture/Observations    Posture- WNL Posture is WNL  -SW    Posture/Observations Comments patient is well developed female.  Escorted to therapy by a friend and small infant.  No distress in appearance. R IC elevated in standing position. Otherwise with good standing alignment.  -SW       Quarter Clearing    Quarter Clearing Lower Quarter Clearing  -SW       DTR- Lower Quarter Clearing    Patellar tendon (L2-4) 0- No response  -SW    Achilles tendon (S1-2) 2- Normal response  -SW       Neural Tension Signs- Lower Quarter Clearing    Slump Negative  -SW    SLR Negative  -SW       Sensory Screen for Light Touch- Lower Quarter Clearing    L1 (inguinal area) Intact  -SW    L2 (anterior mid thigh) Intact  -SW    L3 (distal anterior thigh) Intact  -SW    L4 (medial lower leg/foot) Intact  -SW    L5 (lateral lower leg/great toe) Intact  -SW       Myotomal Screen- Lower Quarter Clearing    Hip flexion (L2) 5 (Normal)  -SW    Knee extension (L3) 5 (Normal)  -SW    Ankle DF (L4) 5 (Normal)  -SW    Great toe extension (L5) 5 (Normal)  -SW    Ankle PF (S1) 5 (Normal)  -SW    Knee flexion (S2) 5 (Normal)  -SW       Lumbar ROM Screen- Lower Quarter Clearing    Lumbar Flexion Normal  -SW    Lumbar Extension Normal  -SW    Lumbar Lateral Flexion Normal  -SW    Lumbar Rotation Normal  -SW    Lumbar Quadrant  Normal  -SW       SI/Hip Screen- Lower Quarter Clearing    ASIS compression Negative  -SW    ASIS distraction Positive  -SW    Belen's/Prakash's test Negative  -SW    Pain in Ranjit's area Negative  -SW       Special Tests/Palpation    Special Tests/Palpation Lumbar/SI   -       Cervical Palpation    Cervical Palpation- Location? Middle traps;Ribs;Upper traps;Rhomboids  -    Middle Traps Left:;Tender;Guarded/taut;Trigger point  radiation of pain with lower fiber palpation.  -    Rhomboids Left:;Tender  -    Ribs --  AP mobility good.  Elevated at T6  -       Lumbosacral Accessory Motions    Lumbosacral Accessory Motions Tested? Yes  AP mobility to LS is good without restriction  -    PA glide- Sacral base --  aligned  -    Innominate rotation --  equal in supine position  -       Lumbosacral Palpation    Lumbosacral Palpation? Yes  -    Lumbosacral Segment --  good AP mobility noted  -    Quadratus Lumborum Left:;Guarded/taut;Tender;Trigger point  latent trigger point without radicular pain noted  -    Lumbosacral Palpation Comments patient presents with good alignment in supine position with symmetry at ASIS and with leg length.  No neural tension elicited with testing.  Presents with L trigger point to QL but suspected latent in nature due to no radicular s/s.  -       General ROM    GENERAL ROM COMMENTS functional UE and CS as well as LS without restrictions.  -       MMT (Manual Muscle Testing)    General MMT Comments functional throughout.  Specific MMT not assessed this date.  -       Balance Skills Training    Balance Comments no balance deficiences noted.  -       Transfers    Comment, (Transfers) independent with all transfers  -       Gait/Stairs (Locomotion)    Comment, (Gait/Stairs) equal step and stride noted bilaterally  -          User Key  (r) = Recorded By, (t) = Taken By, (c) = Cosigned By    Initials Name Provider Type    Tamara Calvillo, PT DPT Physical Therapist                            PT Assessment/Plan     Row Name 07/14/22 1700          PT Assessment    Functional Limitations Performance in self-care ADL;Performance in work activities;Limitations in community activities;Limitation in home management;Performance in  leisure activities  -     Impairments Impaired muscle length;Impaired postural alignment;Pain;Poor body mechanics;Posture  -     Assessment Comments Patient is a 22 yo female presenting to clinic with c/o upper and lower back pain.  No SARAH noted recently.  Prior history of fall from rope swing as child.  Otherwise gradual onset.  Patient presents with musculoskeletal pain / myofascial pain due to T6 rotation to L with middle trap trigger point on L.  Mild TP noted at QL on L but appears latent in nature.  She would benefit from skilled therapy to address spinal mobility, tissue motility and pain reduction while improving overall throacic alignment.  -     Rehab Potential Good  -     Patient/caregiver participated in establishment of treatment plan and goals Yes  -     Patient would benefit from skilled therapy intervention Yes  -            PT Plan    PT Frequency --  to return in 2 weeks  -     Planned CPT's? PT EVAL MOD COMPLELITY: 74123;PT RE-EVAL: 11100;PT THER PROC EA 15 MIN: 80386;PT THER ACT EA 15 MIN: 35673;PT MANUAL THERAPY EA 15 MIN: 08701;PT SELF CARE/HOME MGMT/TRAIN EA 15: 89368  -     PT Plan Comments Manual therapy for alignment to thoracic region specifically with T6.  MFR to middle trap on L.  Teach stretch and stabilization to improve mobility and reduce pain. Strength and stabilization to promote continued caregiving and [employer] work to term of pregnancy.  -           User Key  (r) = Recorded By, (t) = Taken By, (c) = Cosigned By    Initials Name Provider Type    Tamara Calvillo, PT DPT Physical Therapist                   OP Exercises     Row Name 07/14/22 1600             Subjective Comments    Subjective Comments Pain started about 1 month ago.  Always had LB problems due to falling from rope swing when she was little.  Getting up off mattress with child in arms and back popped in middle of back. she was standing up from seated position when she heard the pop. Works at  [employer].  Works 40 hrs per week.  Missed a lot due to pain.  Can't lift above 10 lbs, no bending and lifting over head.  -SW              Subjective Pain    Able to rate subjective pain? yes  -SW      Pre-Treatment Pain Level 6  -SW      Post-Treatment Pain Level 4  -SW      Subjective Pain Comment sore but better.  -SW              Exercise 1    Exercise Name 1 elephant stretch  -SW      Reps 1 3  -SW      Time 1 30sec  -SW              Exercise 2    Exercise Name 2 reverse corners  -SW      Reps 2 20  -SW              Exercise 3    Exercise Name 3 instructed on heat  -SW      Additional Comments post manual work to fascia, recommended MHP to assist with pain.  -SW            User Key  (r) = Recorded By, (t) = Taken By, (c) = Cosigned By    Initials Name Provider Type    Tamara Calvillo, PT DPT Physical Therapist              Manual Rx (last 36 hours)     Manual Treatments     Row Name 07/14/22 1700             Manual Rx 1    Manual Rx 1 Location alignment assessment  -SW              Manual Rx 2    Manual Rx 2 Location middle trap deactivation and reinforcement  -SW              Manual Rx 3    Manual Rx 3 Location QL deactivation and reinforcement  -SW            User Key  (r) = Recorded By, (t) = Taken By, (c) = Cosigned By    Initials Name Provider Type    Tamara Calvillo, PT DPT Physical Therapist                           PT OP Goals     Row Name 07/14/22 1700          PT Short Term Goals    STG Date to Achieve 08/12/22  -     STG 1 patient to be independent with ther ex performance to improve spinal mobility, alignment, and reduce pain  -     STG 1 Progress New  -     STG 2 patient to present with thoracic alignment symmetry without rotation of T6.  -SW     STG 2 Progress New  -     STG 3 pain to reduce to 4/10 or less, indicated 50% improvement in s/s.  -SW     STG 3 Progress New  -     STG 4 patient to demo good tissue mobility to mid trap on L side without painful, radiation of s/s  upon palpation.  -     STG 4 Progress New  -            Long Term Goals    LTG Date to Achieve 12/30/22  -     LTG 1 pain reduced to 2/10 or less using ther ex and postural awareness for pain reduction  -     LTG 1 Progress New  -     LTG 2 patient to report 70% improvement overall since induction of PT  -     LTG 2 Progress New  -     LTG 3 patient to demonstrate strengthening program to upper back and core to assist with daily function and work simulated activities without pain.  -     LTG 3 Progress New  -     LTG 4 Mod Oswestry score of 12% or less indicating improved function and less pain.  -     LTG 4 Progress New  -            Time Calculation    PT Goal Re-Cert Due Date 08/12/22  -           User Key  (r) = Recorded By, (t) = Taken By, (c) = Cosigned By    Initials Name Provider Type    Tamara Calvillo, PT DPT Physical Therapist                Therapy Education  Given: HEP, Symptoms/condition management  Program: New  How Provided: Verbal, Demonstration, Written  Provided to: Patient  Level of Understanding: Teach back education performed, Verbalized, Demonstrated     Outcome Measure Options: Modified Oswestry  Modified Oswestry  Modified Oswestry Score/Comments: 15/50=30%      Time Calculation:   Start Time: 1615 (pt arrived 15 minutes late for appt)  Stop Time: 1700  Time Calculation (min): 45 min  Therapy Charges for Today     Code Description Service Date Service Provider Modifiers Qty    60907089747 HC PT EVAL MOD COMPLEXITY 2 7/14/2022 Tamara Maurer, PT DPT GP 1    46057761083 HC PT MANUAL THERAPY EA 15 MIN 7/14/2022 Tamara Maurer, PT DPT GP 1          PT G-Codes  Outcome Measure Options: Modified Oswestry  Modified Oswestry Score/Comments: 15/50=30%       Tamara Maurer PT DPT  7/14/2022

## 2022-07-19 ENCOUNTER — ROUTINE PRENATAL (OUTPATIENT)
Dept: OBSTETRICS AND GYNECOLOGY | Facility: CLINIC | Age: 22
End: 2022-07-19

## 2022-07-19 VITALS — BODY MASS INDEX: 35.9 KG/M2 | SYSTOLIC BLOOD PRESSURE: 112 MMHG | DIASTOLIC BLOOD PRESSURE: 64 MMHG | WEIGHT: 190 LBS

## 2022-07-19 DIAGNOSIS — O99.322 DRUG USE AFFECTING PREGNANCY IN SECOND TRIMESTER: ICD-10-CM

## 2022-07-19 DIAGNOSIS — O99.891 BACK PAIN AFFECTING PREGNANCY, ANTEPARTUM: ICD-10-CM

## 2022-07-19 DIAGNOSIS — Z28.39 RUBELLA NON-IMMUNE STATUS, ANTEPARTUM: ICD-10-CM

## 2022-07-19 DIAGNOSIS — Z78.9 ELECTRONIC CIGARETTE USE: ICD-10-CM

## 2022-07-19 DIAGNOSIS — O09.899 RUBELLA NON-IMMUNE STATUS, ANTEPARTUM: ICD-10-CM

## 2022-07-19 DIAGNOSIS — O21.9 NAUSEA AND VOMITING IN PREGNANCY: ICD-10-CM

## 2022-07-19 DIAGNOSIS — Z86.19 HISTORY OF COLD SORES: ICD-10-CM

## 2022-07-19 DIAGNOSIS — F12.10 MARIJUANA ABUSE: ICD-10-CM

## 2022-07-19 DIAGNOSIS — M54.9 BACK PAIN AFFECTING PREGNANCY, ANTEPARTUM: ICD-10-CM

## 2022-07-19 DIAGNOSIS — O09.42 HIGH RISK MULTIGRAVIDA IN SECOND TRIMESTER: Primary | ICD-10-CM

## 2022-07-19 PROCEDURE — 99214 OFFICE O/P EST MOD 30 MIN: CPT | Performed by: STUDENT IN AN ORGANIZED HEALTH CARE EDUCATION/TRAINING PROGRAM

## 2022-07-19 NOTE — PROGRESS NOTES
CC: Prenatal visit    Yuridia Irizarry is a 21 y.o.  at 19w0d.  Doing well.  Denies contractions, LOF, or VB.  Reports good FM. Tums helping GERD sufficiently. PT helping back pain.    /64   Wt 86.2 kg (190 lb)   LMP 2022 (Approximate)   BMI 35.90 kg/m²   SVE: deferred     Fetal Heart Rate: 140    A/P: Yuridia Irizarry is a 21 y.o.  at 19w0d.  - RTC in 4 weeks  - Reviewed COVID-19 visitation policy  - Reviewed COVID-19 precautions    Problem List Items Addressed This Visit        Gravid and     Drug use affecting pregnancy in second trimester    Overview     THC @ NOB           Rubella non-immune status, antepartum    High risk multigravida in second trimester - Primary    Nausea and vomiting in pregnancy       Mental Health    Marijuana abuse       Tobacco    Electronic cigarette use       Other    History of cold sores    Overview     +HSV culture. Needs prophylaxis at 36 weeks.                     Diagnosis Plan   1. High risk multigravida in second trimester     2. Nausea and vomiting in pregnancy     3. Marijuana abuse     4. Electronic cigarette use     5. Rubella non-immune status, antepartum     6. Drug use affecting pregnancy in second trimester     7. History of cold sores       Nuvia Alonso DO  2022  21:52 CDT

## 2022-07-25 ENCOUNTER — HOSPITAL ENCOUNTER (OUTPATIENT)
Dept: PHYSICAL THERAPY | Facility: HOSPITAL | Age: 22
Setting detail: THERAPIES SERIES
Discharge: HOME | End: 2022-07-25

## 2022-07-25 DIAGNOSIS — O99.891 BACK PAIN IN PREGNANCY: Primary | ICD-10-CM

## 2022-07-25 DIAGNOSIS — M54.9 BACK PAIN IN PREGNANCY: Primary | ICD-10-CM

## 2022-07-25 PROCEDURE — 97140 MANUAL THERAPY 1/> REGIONS: CPT | Performed by: PHYSICAL THERAPIST

## 2022-07-25 PROCEDURE — 97110 THERAPEUTIC EXERCISES: CPT | Performed by: PHYSICAL THERAPIST

## 2022-07-25 NOTE — THERAPY TREATMENT NOTE
Outpatient Physical Therapy Pelvic Health Treatment Note  [facility]     Patient Name: Yuridia Irizarry  : 2000  MRN: [id number]  Today's Date: 2022        Visit Date: 2022   Visit number:   Recheck: 22  Insurance: Diane BCRUTH      Visit Dx:    ICD-10-CM ICD-9-CM   1. Back pain in pregnancy  O99.891 646.80    M54.9 724.5       Patient Active Problem List   Diagnosis   • Intermittent asthma without complication   • Drug use affecting pregnancy in second trimester   • Rubella non-immune status, antepartum   • High risk multigravida in second trimester   • Marijuana abuse   • Electronic cigarette use   • Nausea and vomiting in pregnancy   • History of cold sores         Pelvic Health     Row Name 22 1400             Pregnancy Questions    Number of Pregnancies 2  -SW      Number of Miscarriages 0  -SW      Has the patient had an ? No  -SW      Number of Children 1  -SW      How many weeks pregnant are you? 20 weeks  -SW      Type of Previous Deliveries Vaginal  -SW      Due Date 22  -SW              Pain Assessment    Pain Score 5  -SW      Post Pain Score 2  -SW            User Key  (r) = Recorded By, (t) = Taken By, (c) = Cosigned By    Initials Name Provider Type    SW Tamara Maurer, PT DPT Physical Therapist               PT Ortho     Row Name 22 1400       Subjective Pain    Post-Treatment Pain Level 2  -SW       Posture/Observations    Posture/Observations Comments alert and oriented.  Good spirits.  No antalgia noted. Ease of independent transfers  -SW       Cervical Palpation    Middle Traps Left:;Tender  -SW    Rhomboids Left:;Tender  -SW       Lumbosacral Accessory Motions    Lumbosacral Accessory Motions Tested? --  aligned  -SW       Lumbosacral Palpation    Lumbosacral Segment --  aligned without tenderness noted this date except QL at L4  -SW    Quadratus Lumborum Left:;Tender;Guarded/taut  L4 segment  -SW    Lumbosacral Palpation  Comments patient with tenderness to L4 QL this date.  Resolved quickly.  No referral of pain noted so anticipate latent trigger point.  -       General ROM    GENERAL ROM COMMENTS functional  -       MMT (Manual Muscle Testing)    General MMT Comments functional  -          User Key  (r) = Recorded By, (t) = Taken By, (c) = Cosigned By    Initials Name Provider Type    Tamara Calvillo, PT DPT Physical Therapist                            PT Assessment/Plan     Row Name 07/25/22 1400          PT Assessment    Functional Limitations Performance in self-care ADL;Performance in work activities;Limitations in community activities;Limitation in home management;Performance in leisure activities  -     Impairments Impaired muscle length;Impaired postural alignment;Pain;Poor body mechanics;Posture  -     Assessment Comments Patient did well with new additions to ther ex this date.  Able to complete with accuracy and ability without inc pain noted.  Responds well to manual work with reduced pain noted.  Progressing with goals.  -     Rehab Potential Good  -     Patient/caregiver participated in establishment of treatment plan and goals Yes  -SW     Patient would benefit from skilled therapy intervention Yes  -SW            PT Plan    PT Frequency 1x/week  -     PT Plan Comments add wall push ups next visit.  lift offs at wall. Manual as able.  -           User Key  (r) = Recorded By, (t) = Taken By, (c) = Cosigned By    Initials Name Provider Type    Tamara Calvillo, PT DPT Physical Therapist                   OP Exercises     Row Name 07/25/22 1400             Subjective Comments    Subjective Comments A little bit better.  Not as much pain.  Exercises have been helping some.  Cramping in arm, hips etc today, not sure if related to how she is sleeping or not. Still a little discomfort between shoulder blades.  Wasn't sore after las visit.  -              Subjective Pain    Able to rate  subjective pain? yes  -SW      Pre-Treatment Pain Level 5  -SW      Post-Treatment Pain Level 2  -SW              Exercise 1    Exercise Name 1 post shoulder stretch  -SW      Reps 1 2  -SW      Time 1 30 sec  -SW      Additional Comments bilateral sides.  -SW              Exercise 2    Exercise Name 2 no money stretch  -SW      Reps 2 3  -SW      Time 2 30 sec  -SW              Exercise 3    Exercise Name 3 no money with TB  -SW      Sets 3 2  -SW      Reps 3 12  -SW      Additional Comments RTB  -SW              Exercise 4    Exercise Name 4 scapular retraction  -SW      Sets 4 2  -SW      Reps 4 10  -SW      Additional Comments RTB  -SW              Exercise 5    Exercise Name 5 ER/IR with TB at 90 de  -SW      Sets 5 2  -SW      Reps 5 10  -SW      Additional Comments RTB; bilaterally  -SW              Exercise 6    Exercise Name 6 reverse corners  -SW      Sets 6 2  -SW      Reps 6 10  -SW            User Key  (r) = Recorded By, (t) = Taken By, (c) = Cosigned By    Initials Name Provider Type     Tamara Maurer, PT DPT Physical Therapist              Manual Rx (last 36 hours)     Manual Treatments     Row Name 07/25/22 1300             Manual Rx 1    Manual Rx 1 Location trap deactivation and reinforcement  -SW              Manual Rx 2    Manual Rx 2 Location QL deactivation and reinforcement  -SW            User Key  (r) = Recorded By, (t) = Taken By, (c) = Cosigned By    Initials Name Provider Type     Tamara Maurer, PT DPT Physical Therapist                           PT OP Goals     Row Name 07/25/22 1400          PT Short Term Goals    STG Date to Achieve 08/12/22  -SW     STG 1 patient to be independent with ther ex performance to improve spinal mobility, alignment, and reduce pain  -SW     STG 1 Progress Progressing  -SW     STG 2 patient to present with thoracic alignment symmetry without rotation of T6.  -SW     STG 2 Progress Progressing  -SW     STG 3 pain to reduce to 4/10 or less,  indicated 50% improvement in s/s.  -     STG 3 Progress New  -     STG 4 patient to demo good tissue mobility to mid trap on L side without painful, radiation of s/s upon palpation.  -     STG 4 Progress New  [facility]            Long Term Goals    LTG Date to Achieve 12/30/22  -     LTG 1 pain reduced to 2/10 or less using ther ex and postural awareness for pain reduction  -     LTG 1 Progress New  -     LTG 2 patient to report 70% improvement overall since induction of PT  -     LTG 2 Progress New  -     LTG 3 patient to demonstrate strengthening program to upper back and core to assist with daily function and work simulated activities without pain.  -     LTG 3 Progress New  -     LTG 4 Mod Oswestry score of 12% or less indicating improved function and less pain.  -     LTG 4 Progress New  [facility]            Time Calculation    PT Goal Re-Cert Due Date 08/12/22  -           User Key  (r) = Recorded By, (t) = Taken By, (c) = Cosigned By    Initials Name Provider Type     Tamara Maurer, PT DPT Physical Therapist                 Therapy Education  Given: HEP, Symptoms/condition management  Program: Reinforced, Progressed  How Provided: Verbal, Demonstration, Written  Provided to: Patient  Level of Understanding: Teach back education performed, Verbalized, Demonstrated              Time Calculation:   Start Time: 1405  Stop Time: 1500  Time Calculation (min): 55 min  Therapy Charges for Today     Code Description Service Date Service Provider Modifiers Qty    73751705006  PT MANUAL THERAPY EA 15 MIN 7/25/2022 Tamara Maurer PT DPT GP 2    [id number]  PT THER PROC EA 15 MIN 7/25/2022 Tamara Maurer PT DPT GP 2                    Tamara Maurer PT DPT  7/25/2022

## 2022-08-04 ENCOUNTER — HOSPITAL ENCOUNTER (OUTPATIENT)
Dept: PHYSICAL THERAPY | Facility: HOSPITAL | Age: 22
Setting detail: THERAPIES SERIES
Discharge: HOME | End: 2022-08-04

## 2022-08-04 DIAGNOSIS — O99.891 BACK PAIN IN PREGNANCY: Primary | ICD-10-CM

## 2022-08-04 DIAGNOSIS — M54.9 BACK PAIN IN PREGNANCY: Primary | ICD-10-CM

## 2022-08-04 PROCEDURE — 97140 MANUAL THERAPY 1/> REGIONS: CPT | Performed by: PHYSICAL THERAPIST

## 2022-08-04 PROCEDURE — 97110 THERAPEUTIC EXERCISES: CPT | Performed by: PHYSICAL THERAPIST

## 2022-08-05 NOTE — THERAPY TREATMENT NOTE
Outpatient Physical Therapy Pelvic Health Treatment Note  [facility]     Patient Name: Yuridia Irizarry  : 2000  MRN: [id number]  Today's Date: 2022        Visit Date: 2022   Visit Number: 3/3  Recheck: 22  Insurance: [facility]    Visit Dx:    ICD-10-CM ICD-9-CM   1. Back pain in pregnancy  O99.891 646.80    M54.9 724.5       Patient Active Problem List   Diagnosis   • Intermittent asthma without complication   • Drug use affecting pregnancy in second trimester   • Rubella non-immune status, antepartum   • High risk multigravida in second trimester   • Marijuana abuse   • Electronic cigarette use   • Nausea and vomiting in pregnancy   • History of cold sores   • Back pain affecting pregnancy, antepartum         Pelvic Health     Row Name 22 1400             Pregnancy Questions    Number of Pregnancies 2  -SW      Number of Miscarriages 0  -SW      Has the patient had an ? No  -SW      Number of Children 1  -SW      How many weeks pregnant are you? 21 weeks  -SW      Type of Previous Deliveries Vaginal  -SW      Due Date 22  -SW              Pain Assessment    Pain Score 7  -SW      Post Pain Score 4  -SW            User Key  (r) = Recorded By, (t) = Taken By, (c) = Cosigned By    Initials Name Provider Type    SW Tamara Maurer, PT DPT Physical Therapist               PT Ortho     Row Name 22 1400       Subjective Comments    Subjective Comments Just wore completely out.  Stated that baby has been up late and not sleeping well.  Back has really been hurting bad after work. Still on restristed/light duty.  Pain has tendancy to travel up to top of shoulders.  LB has always hurt.  Doing exercises when I can.  -SW       Subjective Pain    Able to rate subjective pain? yes  -SW    Pre-Treatment Pain Level 7  -SW    Post-Treatment Pain Level 4  -SW       Posture/Observations    Posture- WNL Posture is WNL  -SW    Posture/Observations Comments brings  mother and child to therapy appt.  Pt gaits without distress and equal symmetrical step bilaterally.  -SW       Lumbosacral Accessory Motions    PA glide- Sacral base --  aligned  -SW    Innominate rotation --  ant rotation of R innom  -SW          User Key  (r) = Recorded By, (t) = Taken By, (c) = Cosigned By    Initials Name Provider Type    Tamara Calvillo, PT DPT Physical Therapist                            PT Assessment/Plan     Row Name 08/04/22 1400          PT Assessment    Assessment Comments Pt continues with elevated pain levels despite efforts of exercise, stretching and manual work.  Trap muscles present without trigger or radiation of s/s.  QL fascial mobility improved with less tension, but still reports pain to this region of LB.  Good tolerance and progression with HEP.  STG 1-4 met.  -SW     Rehab Potential Good  -SW     Patient/caregiver participated in establishment of treatment plan and goals Yes  -SW     Patient would benefit from skilled therapy intervention Yes  -SW            PT Plan    PT Frequency 1x/week  -     PT Plan Comments add wall push ups and reverse corners next visit.  prone opp arm/leg raises then transition to quadruped position.  -SW           User Key  (r) = Recorded By, (t) = Taken By, (c) = Cosigned By    Initials Name Provider Type    Tamara Calvillo, PT DPT Physical Therapist                   OP Exercises     Row Name 08/04/22 1400             Subjective Comments    Subjective Comments Just wore completely out.  Stated that baby has been up late and not sleeping well.  Back has really been hurting bad after work. Still on restristed/light duty.  Pain has tendancy to travel up to top of shoulders.  LB has always hurt.  Doing exercises when I can.  -SW              Subjective Pain    Able to rate subjective pain? yes  -SW      Pre-Treatment Pain Level 7  -SW      Post-Treatment Pain Level 4  -SW              Exercise 1    Exercise Name 1 Hamstring stretch   -SW      Reps 1 2  -SW      Time 1 30 sec  -SW              Exercise 2    Exercise Name 2 adductor stretch  -SW      Reps 2 2  -SW      Time 2 30 sec  -SW              Exercise 3    Exercise Name 3 piriformis stretch  -SW      Reps 3 2  -SW      Time 3 30 sec  -SW              Exercise 4    Exercise Name 4 physioball tilts ant/post  -SW      Time 4 1'min  -SW              Exercise 5    Exercise Name 5 physioball lateral tilts  -SW      Time 5 1'min  -SW              Exercise 6    Exercise Name 6 physioball circles CW.CCW  -SW      Time 6 2'min each  -SW              Exercise 7    Exercise Name 7 physioball sit at neutral with no money  -SW      Sets 7 2  -SW      Reps 7 10  -SW      Additional Comments RTB  -SW              Exercise 8    Exercise Name 8 physioball  neutral with TB clocks  -SW      Sets 8 2  -SW      Reps 8 10  -SW      Additional Comments RTB  -SW              Exercise 9    Exercise Name 9 physioball neutral with TA and rows  -SW      Sets 9 2  -SW      Reps 9 10  -SW      Additional Comments RTb  -SW              Exercise 10    Exercise Name 10 prone TA with TKE  -SW      Sets 10 2  -SW      Reps 10 10  -SW              Exercise 11    Exercise Name 11 prone hip ext  -SW      Reps 11 10  -SW      Additional Comments bilaterally  -SW            User Key  (r) = Recorded By, (t) = Taken By, (c) = Cosigned By    Initials Name Provider Type     Tamara Maurer, PT DPT Physical Therapist              Manual Rx (last 36 hours)     Manual Treatments     Row Name 08/05/22 1100             Manual Rx 1    Manual Rx 1 Location alignment assessment with MET R ant innom  -SW              Manual Rx 2    Manual Rx 2 Location QL deactivation and reinforcement  -SW              Manual Rx 3    Manual Rx 3 Location LB STB and MFR  -SW            User Key  (r) = Recorded By, (t) = Taken By, (c) = Cosigned By    Initials Name Provider Type    SW Tamara Maurer, PT DPT Physical Therapist                            PT OP Goals     Row Name 08/04/22 1400          PT Short Term Goals    STG Date to Achieve 08/12/22  -     STG 1 patient to be independent with ther ex performance to improve spinal mobility, alignment, and reduce pain  -     STG 1 Progress Met  -     STG 2 patient to present with thoracic alignment symmetry without rotation of T6.  -     STG 2 Progress Progressing  -     STG 3 pain to reduce to 4/10 or less, indicated 50% improvement in s/s.  -     STG 3 Progress Progressing  -     STG 4 patient to demo good tissue mobility to mid trap on L side without painful, radiation of s/s upon palpation.  -     STG 4 Progress Met  -            Long Term Goals    LTG Date to Achieve 12/30/22  -     LTG 1 pain reduced to 2/10 or less using ther ex and postural awareness for pain reduction  -     LTG 1 Progress New  -     LTG 2 patient to report 70% improvement overall since induction of PT  -     LTG 2 Progress New  -     LTG 3 patient to demonstrate strengthening program to upper back and core to assist with daily function and work simulated activities without pain.  -     LTG 3 Progress New  -     LTG 4 Mod Oswestry score of 12% or less indicating improved function and less pain.  -     LTG 4 Progress New  -            Time Calculation    PT Goal Re-Cert Due Date 08/12/22  -           User Key  (r) = Recorded By, (t) = Taken By, (c) = Cosigned By    Initials Name Provider Type    Tamara Calvillo, PT DPT Physical Therapist                 Therapy Education  Given: HEP, Symptoms/condition management  Program: Reinforced, Progressed  How Provided: Verbal, Demonstration  Provided to: Patient  Level of Understanding: Teach back education performed, Verbalized, Demonstrated              Time Calculation:   Start Time: 1406  Stop Time: 1503  Time Calculation (min): 57 min  Therapy Charges for Today     Code Description Service Date Service Provider Modifiers Qty     07257718299  PT THER PROC EA 15 MIN 8/4/2022 Tamara Maurer, PT DPT GP 3    44674522568 HC PT MANUAL THERAPY EA 15 MIN 8/4/2022 Tamara Maurer, PT DPT GP 1                    Tamara Maurer, PT DPT  8/5/2022

## 2022-08-11 ENCOUNTER — HOSPITAL ENCOUNTER (OUTPATIENT)
Dept: PHYSICAL THERAPY | Facility: HOSPITAL | Age: 22
Setting detail: THERAPIES SERIES
Discharge: HOME | End: 2022-08-11

## 2022-08-11 DIAGNOSIS — M54.9 BACK PAIN IN PREGNANCY: Primary | ICD-10-CM

## 2022-08-11 DIAGNOSIS — O99.891 BACK PAIN IN PREGNANCY: Primary | ICD-10-CM

## 2022-08-11 PROCEDURE — 97110 THERAPEUTIC EXERCISES: CPT | Performed by: PHYSICAL THERAPIST

## 2022-08-11 PROCEDURE — 97140 MANUAL THERAPY 1/> REGIONS: CPT | Performed by: PHYSICAL THERAPIST

## 2022-08-11 NOTE — THERAPY TREATMENT NOTE
Outpatient Physical Therapy Pelvic Health Progress Note  [facility]     Patient Name: Yuridia Irizarry  : 2000  MRN: [id number]  Today's Date: 2022        Visit Date: 2022   Visit Number:   Recheck: 22  Insurance: Diane [facility]  Improvement: 20%      Visit Dx:    ICD-10-CM ICD-9-CM   1. Back pain in pregnancy  O99.891 646.80    M54.9 724.5       Patient Active Problem List   Diagnosis   • Intermittent asthma without complication   • Drug use affecting pregnancy in second trimester   • Rubella non-immune status, antepartum   • High risk multigravida in second trimester   • Marijuana abuse   • Electronic cigarette use   • Nausea and vomiting in pregnancy   • History of cold sores   • Back pain affecting pregnancy, antepartum         Pelvic Health     Row Name 22 1500             Subjective Comments    Subjective Comments Whenever I am on my feet for long periods, my back hurts.  Seems to be less when not working. My pain varies.  Mainly LB yesterday at work.  But my whole back hurts. I have seen a little improvement in back since starting.  But with me working, pain goes right back up.  -SW              Pregnancy Questions    Number of Pregnancies 2  -SW      Number of Miscarriages 0  -SW      Has the patient had an ? No  -SW      Number of Children 1  -SW      How many weeks pregnant are you? 22 weeks  -SW      Type of Previous Deliveries Vaginal  -SW      Due Date 22  -SW              Pain Assessment    Pain Score 6  -SW      Post Pain Score 4  -SW            User Key  (r) = Recorded By, (t) = Taken By, (c) = Cosigned By    Initials Name Provider Type    SW Tamara Maurer, PT DPT Physical Therapist               PT Ortho     Row Name 22 1500       Subjective Pain    Able to rate subjective pain? yes  -SW    Pre-Treatment Pain Level 6  -SW    Post-Treatment Pain Level 4  -SW       Posture/Observations    Posture/Observations Comments gaits into  clinic carrying child on hip.  NO distress. No antalgia noted.  -SW       Neural Tension Signs- Lower Quarter Clearing    Slump Negative  -SW    SLR Negative  -SW       Myotomal Screen- Lower Quarter Clearing    Hip flexion (L2) 5 (Normal)  -SW    Knee extension (L3) 5 (Normal)  -SW    Ankle DF (L4) 5 (Normal)  -SW    Great toe extension (L5) 5 (Normal)  -SW    Ankle PF (S1) 5 (Normal)  -SW    Knee flexion (S2) 5 (Normal)  -SW       Lumbar ROM Screen- Lower Quarter Clearing    Lumbar Flexion Normal  -SW    Lumbar Extension Normal  -SW    Lumbar Lateral Flexion Normal  -SW    Lumbar Rotation Normal  -SW       SI/Hip Screen- Lower Quarter Clearing    ASIS distraction Negative  -SW       Cervical Palpation    Cervical Palpation- Location? --  no trigger points noted in upper trap  -SW       Lumbosacral Accessory Motions    Lumbosacral Accessory Motions Tested? --  symemtrical alignment  -SW       Lumbosacral Palpation    Quadratus Lumborum --  tender but without triggers noted  -SW    Lumbosacral Palpation Comments improved fascial mobiltiy to QL and trap region. No taut bands noted.  -          User Key  (r) = Recorded By, (t) = Taken By, (c) = Cosigned By    Initials Name Provider Type    SW Tamara Maurer, PT DPT Physical Therapist                            PT Assessment/Plan     Row Name 08/11/22 1500          PT Assessment    Assessment Comments Patient continues to complain of pain despite manual and ther ex completed.  Pain varies in location from upper to lower back and stays steady at 5-6/10.  Improved fascial mobility to thoracic and lumbar regions.  No trigger points or taut bands noted this date. Good tolerance to exercise progression. STG 1, 2, and 4 met.  Progressing toward other goals.  -     Rehab Potential Good  -     Patient/caregiver participated in establishment of treatment plan and goals Yes  -     Patient would benefit from skilled therapy intervention Yes  -SW            PT Plan     PT Frequency 1x/week  -SW     PT Plan Comments cont with advanced stabilization.  Abd in SL and Clamshells next visit.  IR/ER to UE with TB.  -SW           User Key  (r) = Recorded By, (t) = Taken By, (c) = Cosigned By    Initials Name Provider Type    SW Tamara Maurer, PT DPT Physical Therapist                   OP Exercises     Row Name 08/11/22 1500             Subjective Comments    Subjective Comments Whenever I am on my feet for long periods, my back hurts.  Seems to be less when not working. My pain varies.  Mainly LB yesterday at work.  But my whole back hurts. I have seen a little improvement in back since starting.  But with me working, pain goes right back up.  -SW              Subjective Pain    Able to rate subjective pain? yes  -SW      Pre-Treatment Pain Level 6  -SW      Post-Treatment Pain Level 4  -SW              Exercise 1    Exercise Name 1 elephant stretch  -SW      Reps 1 5  -SW      Time 1 10 sec  -SW              Exercise 2    Exercise Name 2 upper trap stretch  -SW      Reps 2 2  -SW      Time 2 30 sec  -SW              Exercise 3    Exercise Name 3 levator stretch  -SW      Reps 3 2  -SW      Time 3 30 sec  -SW              Exercise 4    Exercise Name 4 hamstring stretch  -SW      Reps 4 2  -SW      Time 4 30 sec  -SW              Exercise 5    Exercise Name 5 adductor stretch  -SW      Reps 5 2  -SW      Time 5 30 sec  -SW              Exercise 6    Exercise Name 6 piriformis stretch  -SW      Reps 6 2  -SW      Time 6 30 sec  -SW              Exercise 7    Exercise Name 7 reverse corners  -SW      Sets 7 2  -SW      Reps 7 10  -SW              Exercise 8    Exercise Name 8 wall push up plus  -SW      Sets 8 2  -SW      Reps 8 10  -SW              Exercise 9    Exercise Name 9 physioball tilts ant and post  -SW      Time 9 1'min  -SW              Exercise 10    Exercise Name 10 physioball lateral tilts  -SW      Time 10 1'min  -SW              Exercise 11    Exercise Name 11  physioball CW/CCW  -SW      Time 11 1'min  -SW              Exercise 12    Exercise Name 12 physioball neutral with o'head swings TA stability  -SW      Reps 12 2  -SW      Time 12 1'min  -SW              Exercise 13    Exercise Name 13 prone opp arm/leg raises  -SW      Reps 13 10  -SW      Additional Comments bilaterally  -SW            User Key  (r) = Recorded By, (t) = Taken By, (c) = Cosigned By    Initials Name Provider Type    SW Tamara Maurer, PT DPT Physical Therapist              Manual Rx (last 36 hours)     Manual Treatments     Row Name 08/11/22 1500             Manual Rx 1    Manual Rx 1 Location QL assessment  -SW              Manual Rx 2    Manual Rx 2 Location STM and MFR to Lumbar region  -SW            User Key  (r) = Recorded By, (t) = Taken By, (c) = Cosigned By    Initials Name Provider Type    SW Tamara Maurer, PT DPT Physical Therapist                           PT OP Goals     Row Name 08/11/22 1500          PT Short Term Goals    STG Date to Achieve 09/08/22  -     STG 1 patient to be independent with ther ex performance to improve spinal mobility, alignment, and reduce pain  -SW     STG 1 Progress Met  -     STG 2 patient to present with thoracic alignment symmetry without rotation of T6.  -SW     STG 2 Progress Met  -     STG 3 pain to reduce to 4/10 or less, indicated 50% improvement in s/s.  -SW     STG 3 Progress Progressing  -     STG 4 patient to demo good tissue mobility to mid trap on L side without painful, radiation of s/s upon palpation.  -     STG 4 Progress Met  -            Long Term Goals    LTG Date to Achieve 12/30/22  -     LTG 1 pain reduced to 2/10 or less using ther ex and postural awareness for pain reduction  -SW     LTG 1 Progress New  -     LTG 2 patient to report 70% improvement overall since induction of PT  -SW     LTG 2 Progress New  -     LTG 3 patient to demonstrate strengthening program to upper back and core to assist with daily  function and work simulated activities without pain.  -     LTG 3 Progress New  -     LTG 4 Mod Oswestry score of 12% or less indicating improved function and less pain.  -     LTG 4 Progress New  -            Time Calculation    PT Goal Re-Cert Due Date 09/08/22  -           User Key  (r) = Recorded By, (t) = Taken By, (c) = Cosigned By    Initials Name Provider Type    SW Tamara Maurer, PT DPT Physical Therapist                 Therapy Education  Given: HEP, Symptoms/condition management  Program: Reinforced, Progressed  How Provided: Verbal, Demonstration  Provided to: Patient  Level of Understanding: Verbalized, Teach back education performed, Demonstrated              Time Calculation:   Start Time: 1503  Stop Time: 1605  Time Calculation (min): 62 min  Therapy Charges for Today     Code Description Service Date Service Provider Modifiers Qty    44471944687  PT MANUAL THERAPY EA 15 MIN 8/11/2022 Tamara Maurer, PT DPT GP 1    59724057923 HC PT THER PROC EA 15 MIN 8/11/2022 Tamara Maurer, PT DPT GP 3                    Tamara Maurer PT DPT  8/11/2022

## 2022-08-14 ENCOUNTER — APPOINTMENT (OUTPATIENT)
Dept: GENERAL RADIOLOGY | Facility: HOSPITAL | Age: 22
End: 2022-08-14

## 2022-08-14 ENCOUNTER — HOSPITAL ENCOUNTER (EMERGENCY)
Facility: HOSPITAL | Age: 22
Discharge: HOME | End: 2022-08-14
Attending: STUDENT IN AN ORGANIZED HEALTH CARE EDUCATION/TRAINING PROGRAM | Admitting: STUDENT IN AN ORGANIZED HEALTH CARE EDUCATION/TRAINING PROGRAM

## 2022-08-14 VITALS
HEART RATE: 104 BPM | DIASTOLIC BLOOD PRESSURE: 61 MMHG | OXYGEN SATURATION: 100 % | WEIGHT: 186 LBS | HEIGHT: 61 IN | BODY MASS INDEX: 35.12 KG/M2 | TEMPERATURE: 97.8 F | RESPIRATION RATE: 18 BRPM | SYSTOLIC BLOOD PRESSURE: 104 MMHG

## 2022-08-14 DIAGNOSIS — M25.571 ACUTE RIGHT ANKLE PAIN: Primary | ICD-10-CM

## 2022-08-14 PROCEDURE — 73610 X-RAY EXAM OF ANKLE: CPT

## 2022-08-14 PROCEDURE — 99283 EMERGENCY DEPT VISIT LOW MDM: CPT

## 2022-08-14 PROCEDURE — 73590 X-RAY EXAM OF LOWER LEG: CPT

## 2022-08-14 NOTE — ED NOTES
Patient states that she was getting out of a vehicle and rolled her right ankle. Patient reports increased pain when walking. Patient is also 22 weeks pregnant.

## 2022-08-14 NOTE — ED PROVIDER NOTES
"Subjective   21-year-old female approximately 23 weeks pregnant comes to the ER with right ankle pain after she \"rolled it\" while getting out of the truck.  Patient rates the pain 7/10.  It is sharp in nature.  She is able to bear weight on it, but it is painful.  No weakness or numbness.  No back pain.  No abdominal pain or bleeding.  She denies having other symptoms.      History provided by:  Patient   used: No        Review of Systems   Constitutional: Negative for chills and fever.   HENT: Negative for drooling.    Eyes: Negative for redness.   Respiratory: Negative for shortness of breath.    Cardiovascular: Negative for chest pain.   Gastrointestinal: Negative for abdominal pain and vomiting.   Genitourinary: Negative for flank pain and vaginal bleeding.   Musculoskeletal: Positive for arthralgias. Negative for myalgias.   Skin: Negative for color change.   Neurological: Negative for seizures, weakness and numbness.   Psychiatric/Behavioral: Negative for confusion.       Past Medical History:   Diagnosis Date   • Asthma    • Injury of back    • Migraine        Allergies   Allergen Reactions   • Rocephin [Ceftriaxone] Rash       Past Surgical History:   Procedure Laterality Date   • TONSILLECTOMY         Family History   Problem Relation Age of Onset   • Seizures Father    • Diabetes Mother    • Arthritis Mother    • No Known Problems Sister    • Breast cancer Paternal Grandmother    • Lung disease Paternal Grandfather    • Asthma Sister        Social History     Socioeconomic History   • Marital status:    • Highest education level: High school graduate   Tobacco Use   • Smoking status: Current Every Day Smoker   • Smokeless tobacco: Never Used   Vaping Use   • Vaping Use: Every day   • Substances: Nicotine, Flavoring, trying to quit   • Devices: Disposable   Substance and Sexual Activity   • Alcohol use: Not Currently   • Drug use: Yes     Types: Marijuana   • Sexual activity: Yes " "    Partners: Male           Objective    Vitals:    08/14/22 1334   BP: 104/61   BP Location: Right arm   Patient Position: Sitting   Pulse: 104   Resp: 18   Temp: 97.8 °F (36.6 °C)   TempSrc: Oral   SpO2: 100%   Weight: 84.4 kg (186 lb)   Height: 154.9 cm (61\")       Physical Exam  Vitals and nursing note reviewed.   Constitutional:       General: She is not in acute distress.     Appearance: She is well-developed. She is obese. She is not ill-appearing or diaphoretic.   HENT:      Head: Normocephalic.      Right Ear: External ear normal.      Left Ear: External ear normal.   Eyes:      Conjunctiva/sclera: Conjunctivae normal.   Pulmonary:      Effort: Pulmonary effort is normal. No accessory muscle usage or respiratory distress.   Abdominal:      General: There is distension (gravid).   Musculoskeletal:      Right lower leg: Normal.      Right ankle: Swelling present. No deformity. Tenderness present. Normal range of motion.   Skin:     General: Skin is warm and dry.      Capillary Refill: Capillary refill takes less than 2 seconds.   Neurological:      Mental Status: She is oriented to person, place, and time.      Sensory: No sensory deficit.      Motor: No weakness.      Coordination: Coordination normal.   Psychiatric:         Behavior: Behavior normal.         Procedures           ED Course      XR Ankle 3+ View Right   Final Result   Negative right ankle.      15028         Electronically signed by:  Satish Garcia MD  8/14/2022 3:23   PM CDT Workstation: [id number]      XR Tibia Fibula 2 View Right   Final Result   No fracture or acute osseous abnormality.      Mild soft tissue swelling lateral malleolar region.      20270      Electronically signed by:  Satish Garcia MD  8/14/2022 3:24   PM CDT Workstation: [id number]                                        [facility]  Number of Diagnoses or Management Options  Acute right ankle pain: new and requires workup  Diagnosis management comments: Vital signs are " stable, afebrile.  Neurovascularly intact.  X-rays negative for acute findings.  Recommend follow-up with PCP and podiatry.  Patient's ankle Ace wrap and crutches provided.  Return precautions given.  Patient states understanding and is agreeable to the plan.  Recommend Tylenol for pain.      Final diagnoses:   Acute right ankle pain       ED Disposition  ED Disposition     ED Disposition   Discharge    Condition   Stable    Comment   --             [facility]  [address]   [place] [address] [phone]  Schedule an appointment as soon as possible for a visit in 2 days  ER follow up    [facility]  [address]  [place] [address] [place] [id number] [phone]  Schedule an appointment as soon as possible for a visit in 2 days  ER follow up         Medication List      No changes were made to your prescriptions during this visit.          Jorge A Goodrich MD  08/14/22 9740

## 2022-08-15 ENCOUNTER — TRANSCRIBE ORDERS (OUTPATIENT)
Dept: PODIATRY | Facility: CLINIC | Age: 22
End: 2022-08-15

## 2022-08-15 DIAGNOSIS — M25.571 ACUTE RIGHT ANKLE PAIN: Primary | ICD-10-CM

## 2022-08-18 ENCOUNTER — HOSPITAL ENCOUNTER (OUTPATIENT)
Dept: PHYSICAL THERAPY | Facility: HOSPITAL | Age: 22
Setting detail: THERAPIES SERIES
Discharge: HOME | End: 2022-08-18

## 2022-08-18 ENCOUNTER — ROUTINE PRENATAL (OUTPATIENT)
Dept: OBSTETRICS AND GYNECOLOGY | Facility: CLINIC | Age: 22
End: 2022-08-18

## 2022-08-18 VITALS — WEIGHT: 186 LBS | SYSTOLIC BLOOD PRESSURE: 116 MMHG | DIASTOLIC BLOOD PRESSURE: 60 MMHG | BODY MASS INDEX: 35.14 KG/M2

## 2022-08-18 DIAGNOSIS — Z78.9 ELECTRONIC CIGARETTE USE: ICD-10-CM

## 2022-08-18 DIAGNOSIS — O09.899 RUBELLA NON-IMMUNE STATUS, ANTEPARTUM: ICD-10-CM

## 2022-08-18 DIAGNOSIS — Z86.19 HISTORY OF COLD SORES: ICD-10-CM

## 2022-08-18 DIAGNOSIS — O99.322 DRUG USE AFFECTING PREGNANCY IN SECOND TRIMESTER: ICD-10-CM

## 2022-08-18 DIAGNOSIS — F12.10 MARIJUANA ABUSE: ICD-10-CM

## 2022-08-18 DIAGNOSIS — M54.9 BACK PAIN IN PREGNANCY: Primary | ICD-10-CM

## 2022-08-18 DIAGNOSIS — O99.891 BACK PAIN AFFECTING PREGNANCY, ANTEPARTUM: ICD-10-CM

## 2022-08-18 DIAGNOSIS — O99.891 BACK PAIN IN PREGNANCY: Primary | ICD-10-CM

## 2022-08-18 DIAGNOSIS — Z3A.23 23 WEEKS GESTATION OF PREGNANCY: ICD-10-CM

## 2022-08-18 DIAGNOSIS — M54.9 BACK PAIN AFFECTING PREGNANCY, ANTEPARTUM: ICD-10-CM

## 2022-08-18 DIAGNOSIS — Z36.89 ENCOUNTER FOR OTHER SPECIFIED ANTENATAL SCREENING: ICD-10-CM

## 2022-08-18 DIAGNOSIS — Z28.39 RUBELLA NON-IMMUNE STATUS, ANTEPARTUM: ICD-10-CM

## 2022-08-18 DIAGNOSIS — O09.42 HIGH RISK MULTIGRAVIDA IN SECOND TRIMESTER: Primary | ICD-10-CM

## 2022-08-18 PROBLEM — O21.9 NAUSEA AND VOMITING IN PREGNANCY: Status: RESOLVED | Noted: 2022-06-08 | Resolved: 2022-08-18

## 2022-08-18 PROCEDURE — 97140 MANUAL THERAPY 1/> REGIONS: CPT | Performed by: PHYSICAL THERAPIST

## 2022-08-18 PROCEDURE — 97110 THERAPEUTIC EXERCISES: CPT | Performed by: PHYSICAL THERAPIST

## 2022-08-18 PROCEDURE — 0502F SUBSEQUENT PRENATAL CARE: CPT | Performed by: NURSE PRACTITIONER

## 2022-08-18 NOTE — PROGRESS NOTES
CC: Prenatal visit    Yuridia Irizarry is a 21 y.o.  at 23w2d.  Doing well.  Denies N/V, dysuria, abnormal vaginal d/c, headaches, heartburn, constipation, cramping, regular contractions, LOF, or VB.  Reports good FM.    /60   Wt 84.4 kg (186 lb)   LMP 2022 (Approximate)   BMI 35.14 kg/m²   SVE: NA     Fetal Heart Rate: 140     Problems (from 22 to present)     Problem Noted Resolved    Back pain affecting pregnancy, antepartum 2022 by Nuvia Alonso DO No    Overview Signed 2022  9:53 PM by Nuvia Alonso DO     In PT         History of cold sores 2022 by Priyanka Cerna APRN No    Overview Signed 2022  9:10 AM by Priyanka Cerna APRN     +HSV culture. Needs prophylaxis at 36 weeks.          High risk multigravida in second trimester 5/3/2022 by Aida Ames APRN No    Marijuana abuse 5/3/2022 by Aida Ames APRN No    Electronic cigarette use 5/3/2022 by Aida Ames APRN No    Overview Signed 2022  9:40 AM by Aida Ames APRN     Smokes 1 cartridge every 2 weeks; cessation efforts encouraged          Rubella non-immune status, antepartum 2022 by Aida Ames APRN No    Drug use affecting pregnancy in second trimester 2022 by Aida Ames APRN No    Overview Signed 2022 10:09 AM by Aida Ames APRN     THC @ [facility]               A/P: Yuridia Irizarry is a 21 y.o.  at 23w2d.  - RTC in 4 weeks  - Reviewed COVID-19 visitation policy  - Reviewed COVID-19 precautions     Diagnosis Plan   1. High risk multigravida in second trimester     2. History of cold sores     3. Marijuana abuse  Urine Drug Screen - Urine, Clean Catch   4. Electronic cigarette use  Encouraged cessation efforts   5. Rubella non-immune status, antepartum     6. Drug use affecting pregnancy in second trimester  Urine Drug Screen - Urine, Clean Catch   7. Back pain affecting pregnancy, antepartum  Continue seeing Tamara for PFPT  and comfort measures at home   8. Encounter for other specified  screening  Glucose, Post 50 Gm Glucola    CBC (No Diff)   9. 23 weeks gestation of pregnancy       Aida Ames, APRN  2022  09:40 CDT

## 2022-08-18 NOTE — THERAPY TREATMENT NOTE
Outpatient Physical Therapy Pelvic Health Treatment Note  [facility]     Patient Name: Yuridia Irizarry  : 2000  MRN: [id number]  Today's Date: 2022        Visit Date: 2022   Visit Number:   Recheck: 22  Insurance: [facility]    Visit Dx:    ICD-10-CM ICD-9-CM   1. Back pain in pregnancy  O99.891 646.80    M54.9 724.5       Patient Active Problem List   Diagnosis   • Intermittent asthma without complication   • Drug use affecting pregnancy in second trimester   • Rubella non-immune status, antepartum   • High risk multigravida in second trimester   • Marijuana abuse   • Electronic cigarette use   • Nausea and vomiting in pregnancy   • History of cold sores   • Back pain affecting pregnancy, antepartum         Pelvic Health     Row Name 22 0800             Pregnancy Questions    Number of Pregnancies 2  -SW      Number of Miscarriages 0  -SW      Has the patient had an ? No  -SW      Number of Children 1  -SW      How many weeks pregnant are you? 23 weeks  -SW      Type of Previous Deliveries Vaginal  -SW      Due Date 22  -SW              Pain Assessment    Pain Score 5  -SW      Post Pain Score 3  -SW            User Key  (r) = Recorded By, (t) = Taken By, (c) = Cosigned By    Initials Name Provider Type    Tamara Calvillo, PT DPT Physical Therapist               PT Ortho     Row Name 22 0800       Subjective Comments    Subjective Comments Sprained R ankle over the weekend.  Ankle rolled but doing better now.  Everything hurts.  I have always had LBP since a child.  Around shoulder blades hurts.  Concerned that it is because of position of sleep.  -SW       Subjective Pain    Able to rate subjective pain? yes  -SW    Pre-Treatment Pain Level 5  -SW    Post-Treatment Pain Level 3  -SW       Posture/Observations    Posture/Observations Comments no distress.  Good arm swing present bilaterally.  Alignment level bilaterally  -SW        Cervical Palpation    Middle Traps Bilateral:;Tender;Guarded/taut;Trigger point  -SW    Rhomboids Bilateral:;Tender;Guarded/taut;Trigger point  -SW       Lumbosacral Accessory Motions    Lumbosacral Accessory Motions Tested? --  alginment symmetrical  -SW       Lumbosacral Palpation    Quadratus Lumborum --  no taut bands detected  -SW    Lumbosacral Palpation Comments No taut bands detected with QL palpation this date.  Triggers noted throughout upper and mid traps bilaterally.  Quick to deactivate.  No limitation noted with AP glide.  -          User Key  (r) = Recorded By, (t) = Taken By, (c) = Cosigned By    Initials Name Provider Type    Tamara Calvillo, PT DPT Physical Therapist                            PT Assessment/Plan     Row Name 08/18/22 0800          PT Assessment    Assessment Comments More complaints with upper back this date.  Tolerated new additions to HEP without complaints.  Manual work revealed no taut bands located in QL bilaterally, but noted in B upper and middle traps. Radiation of pain into UE bilaterally but quickly resolved with deactivation techniques.  Anticipate triggers could be postural triggered and or sleep related since patient has not been to work this morning. Pt would benefit from continued training to improve postural awareness, dec pain and improve overall function.  -     Rehab Potential Good  -     Patient/caregiver participated in establishment of treatment plan and goals Yes  -     Patient would benefit from skilled therapy intervention Yes  -SW            PT Plan    PT Frequency 1x/week  -     PT Plan Comments Add clamshells and hip Abd with next visit.  -           User Key  (r) = Recorded By, (t) = Taken By, (c) = Cosigned By    Initials Name Provider Type    Tamara Calvillo, PT DPT Physical Therapist                   OP Exercises     Row Name 08/18/22 0800             Subjective Comments    Subjective Comments Sprained R ankle over the  weekend.  Ankle rolled but doing better now.  Everything hurts.  I have always had LBP since a child.  Around shoulder blades hurts.  Concerned that it is because of position of sleep.  -SW              Subjective Pain    Able to rate subjective pain? yes  -SW      Pre-Treatment Pain Level 5  -SW      Post-Treatment Pain Level 3  -SW              Exercise 1    Exercise Name 1 elephant stretch  -SW      Reps 1 3  -SW      Time 1 30 sec  -SW              Exercise 2    Exercise Name 2 upper trap stretch  -SW      Reps 2 2  -SW      Time 2 30 sec  -SW              Exercise 3    Exercise Name 3 levator stretch  -SW      Reps 3 2  -SW      Time 3 30 sec  -SW              Exercise 4    Exercise Name 4 Rows TB  -SW      Sets 4 2  -SW      Reps 4 15  -SW              Exercise 5    Exercise Name 5 IR/ER TB  -SW      Sets 5 2  -SW      Reps 5 10  -SW      Additional Comments GTB  -SW              Exercise 6    Exercise Name 6 Reverse corners  -SW      Sets 6 2  -SW      Reps 6 10  -SW              Exercise 7    Exercise Name 7 B shoulder extension TB  -SW      Sets 7 2  -SW      Reps 7 10  -SW      Additional Comments GTB  -SW              Exercise 8    Exercise Name 8 wall push ups plus  -SW      Sets 8 2  -SW      Reps 8 15  -SW              Exercise 9    Exercise Name 9 prone scrap rows  -SW      Reps 9 10  -SW              Exercise 10    Exercise Name 10 prone horizontal abd.  -SW      Reps 10 10  -SW            User Key  (r) = Recorded By, (t) = Taken By, (c) = Cosigned By    Initials Name Provider Type    Tamara Calvillo, PT DPT Physical Therapist              Manual Rx (last 36 hours)     Manual Treatments     Row Name 08/18/22 0700             Manual Rx 1    Manual Rx 1 Location bilateral trap deactivation and reinforcement  -SW            User Key  (r) = Recorded By, (t) = Taken By, (c) = Cosigned By    Initials Name Provider Type    Tamara Calvillo, PT DPT Physical Therapist                           PT  OP Goals     Row Name 08/18/22 0800          PT Short Term Goals    STG Date to Achieve 09/08/22  -     STG 1 patient to be independent with ther ex performance to improve spinal mobility, alignment, and reduce pain  -     STG 1 Progress Met  -     STG 2 patient to present with thoracic alignment symmetry without rotation of T6.  -     STG 2 Progress Met  -     STG 3 pain to reduce to 4/10 or less, indicated 50% improvement in s/s.  -     STG 3 Progress Progressing  -     STG 4 patient to demo good tissue mobility to mid trap on L side without painful, radiation of s/s upon palpation.  -     STG 4 Progress Met  -            Long Term Goals    LTG Date to Achieve 12/30/22  -     LTG 1 pain reduced to 2/10 or less using ther ex and postural awareness for pain reduction  -     LTG 1 Progress New  -     LTG 2 patient to report 70% improvement overall since induction of PT  -     LTG 2 Progress New  -     LTG 3 patient to demonstrate strengthening program to upper back and core to assist with daily function and work simulated activities without pain.  -     LTG 3 Progress New  -     LTG 4 Mod Oswestry score of 12% or less indicating improved function and less pain.  -     LTG 4 Progress New  -            Time Calculation    PT Goal Re-Cert Due Date 09/08/22  -           User Key  (r) = Recorded By, (t) = Taken By, (c) = Cosigned By    Initials Name Provider Type    Tamara Calvillo, PT DPT Physical Therapist                 Therapy Education  Given: HEP, Symptoms/condition management  Program: Reinforced, Progressed  How Provided: Verbal, Demonstration  Provided to: Patient  Level of Understanding: Teach back education performed, Verbalized, Demonstrated              Time Calculation:   Start Time: 0820  Stop Time: 0913  Time Calculation (min): 53 min  Therapy Charges for Today     Code Description Service Date Service Provider Modifiers Qty    49290599814 HC PT MANUAL THERAPY  EA 15 MIN 8/18/2022 Tamara Maurer, PT DPT GP 2    [id number]  PT THER PROC EA 15 MIN 8/18/2022 Tamara Maurer, PT DPT GP 2                    Tamara Maurer, PT DPT  8/18/2022

## 2022-08-25 ENCOUNTER — APPOINTMENT (OUTPATIENT)
Dept: PHYSICAL THERAPY | Facility: HOSPITAL | Age: 22
End: 2022-08-25

## 2022-08-27 PROBLEM — B97.89 VIRAL RESPIRATORY ILLNESS: Status: ACTIVE | Noted: 2022-08-27

## 2022-08-27 PROBLEM — J98.8 VIRAL RESPIRATORY ILLNESS: Status: ACTIVE | Noted: 2022-08-27

## 2022-09-15 ENCOUNTER — LAB (OUTPATIENT)
Dept: LAB | Facility: HOSPITAL | Age: 22
End: 2022-09-15

## 2022-09-15 ENCOUNTER — ROUTINE PRENATAL (OUTPATIENT)
Dept: OBSTETRICS AND GYNECOLOGY | Facility: CLINIC | Age: 22
End: 2022-09-15

## 2022-09-15 VITALS — DIASTOLIC BLOOD PRESSURE: 70 MMHG | WEIGHT: 196 LBS | BODY MASS INDEX: 37.03 KG/M2 | SYSTOLIC BLOOD PRESSURE: 118 MMHG

## 2022-09-15 DIAGNOSIS — F12.10 MARIJUANA ABUSE: ICD-10-CM

## 2022-09-15 DIAGNOSIS — Z78.9 ELECTRONIC CIGARETTE USE: ICD-10-CM

## 2022-09-15 DIAGNOSIS — O09.42 HIGH RISK MULTIGRAVIDA IN SECOND TRIMESTER: ICD-10-CM

## 2022-09-15 DIAGNOSIS — O99.891 BACK PAIN AFFECTING PREGNANCY, ANTEPARTUM: Primary | ICD-10-CM

## 2022-09-15 DIAGNOSIS — Z23 NEED FOR TDAP VACCINATION: ICD-10-CM

## 2022-09-15 DIAGNOSIS — M54.9 BACK PAIN AFFECTING PREGNANCY, ANTEPARTUM: Primary | ICD-10-CM

## 2022-09-15 DIAGNOSIS — Z36.89 ENCOUNTER FOR OTHER SPECIFIED ANTENATAL SCREENING: ICD-10-CM

## 2022-09-15 DIAGNOSIS — Z3A.27 27 WEEKS GESTATION OF PREGNANCY: ICD-10-CM

## 2022-09-15 DIAGNOSIS — O26.842 UTERINE SIZE-DATE DISCREPANCY IN SECOND TRIMESTER: ICD-10-CM

## 2022-09-15 DIAGNOSIS — O09.899 RUBELLA NON-IMMUNE STATUS, ANTEPARTUM: ICD-10-CM

## 2022-09-15 DIAGNOSIS — O99.322 DRUG USE AFFECTING PREGNANCY IN SECOND TRIMESTER: ICD-10-CM

## 2022-09-15 DIAGNOSIS — Z28.39 RUBELLA NON-IMMUNE STATUS, ANTEPARTUM: ICD-10-CM

## 2022-09-15 LAB
AMPHET+METHAMPHET UR QL: NEGATIVE
AMPHETAMINES UR QL: NEGATIVE
BARBITURATES UR QL SCN: NEGATIVE
BENZODIAZ UR QL SCN: NEGATIVE
BUPRENORPHINE SERPL-MCNC: NEGATIVE NG/ML
CANNABINOIDS SERPL QL: NEGATIVE
COCAINE UR QL: NEGATIVE
DEPRECATED RDW RBC AUTO: 43.4 FL (ref 37–54)
ERYTHROCYTE [DISTWIDTH] IN BLOOD BY AUTOMATED COUNT: 13.3 % (ref 12.3–15.4)
GLUCOSE 1H P 100 G GLC PO SERPL-MCNC: 150 MG/DL (ref 65–139)
HCT VFR BLD AUTO: 29.8 % (ref 34–46.6)
HGB BLD-MCNC: 9.7 G/DL (ref 12–15.9)
MCH RBC QN AUTO: 29.2 PG (ref 26.6–33)
MCHC RBC AUTO-ENTMCNC: 32.6 G/DL (ref 31.5–35.7)
MCV RBC AUTO: 89.8 FL (ref 79–97)
METHADONE UR QL SCN: NEGATIVE
OPIATES UR QL: NEGATIVE
OXYCODONE UR QL SCN: NEGATIVE
PCP UR QL SCN: NEGATIVE
PLATELET # BLD AUTO: 271 10*3/MM3 (ref 140–450)
PMV BLD AUTO: 10.9 FL (ref 6–12)
PROPOXYPH UR QL: NEGATIVE
RBC # BLD AUTO: 3.32 10*6/MM3 (ref 3.77–5.28)
TRICYCLICS UR QL SCN: NEGATIVE
WBC NRBC COR # BLD: 8.14 10*3/MM3 (ref 3.4–10.8)

## 2022-09-15 PROCEDURE — 85027 COMPLETE CBC AUTOMATED: CPT

## 2022-09-15 PROCEDURE — 82950 GLUCOSE TEST: CPT

## 2022-09-15 PROCEDURE — 90471 IMMUNIZATION ADMIN: CPT | Performed by: NURSE PRACTITIONER

## 2022-09-15 PROCEDURE — 80306 DRUG TEST PRSMV INSTRMNT: CPT

## 2022-09-15 PROCEDURE — 0502F SUBSEQUENT PRENATAL CARE: CPT | Performed by: NURSE PRACTITIONER

## 2022-09-15 PROCEDURE — 90715 TDAP VACCINE 7 YRS/> IM: CPT | Performed by: NURSE PRACTITIONER

## 2022-09-15 PROCEDURE — 36415 COLL VENOUS BLD VENIPUNCTURE: CPT

## 2022-09-15 NOTE — PROGRESS NOTES
CC: Prenatal visit    Yuridia Irizarry is a 21 y.o.  at 27w2d.  Doing well.  Denies dysuria, abnormal vaginal d/c, headaches, heartburn, constipation, cramping, regular contractions, LOF, or VB.  Reports good FM.    /70   Wt 88.9 kg (196 lb)   LMP 2022 (Approximate)   BMI 37.03 kg/m²   SVE: NA  Fundal Height (cm): 31 cm  Fetal Heart Rate: 136     Problems (from 22 to present)     Problem Noted Resolved    Back pain affecting pregnancy, antepartum 2022 by Nuvia Alonso DO No    Overview Signed 2022  9:53 PM by Nuvia Alonso DO     In PT         History of cold sores 2022 by Priyanka Cerna APRN No    Overview Signed 2022  9:10 AM by Priyanka Cerna APRN     +HSV culture. Needs prophylaxis at 36 weeks.          High risk multigravida in second trimester 5/3/2022 by Aida Ames APRN No    Marijuana abuse 5/3/2022 by Aida Ames APRN No    Electronic cigarette use 5/3/2022 by Aida Ames APRN No    Overview Signed 2022  9:40 AM by Aida Ames APRN     Smokes 1 cartridge every 2 weeks; cessation efforts encouraged          Rubella non-immune status, antepartum 2022 by Aida Ames APRN No    Drug use affecting pregnancy in second trimester 2022 by Aida Ames APRN No    Overview Signed 2022 10:09 AM by Aida Ames APRN     THC @ [facility]               A/P: Yuridia Irizarry is a 21 y.o.  at 27w2d.  - RTC in 2 weeks  - Reviewed COVID-19 visitation policy  - Reviewed COVID-19 precautions     Diagnosis Plan   1. Back pain affecting pregnancy, antepartum     2. High risk multigravida in second trimester     3. Marijuana abuse  Repeat UDS today   4. Electronic cigarette use  Encouraged cessation efforts   5. Rubella non-immune status, antepartum     6. Drug use affecting pregnancy in second trimester     7. 27 weeks gestation of pregnancy     8. Uterine size-date discrepancy in second trimester  US ob  follow up transabdominal approach     Aida Ames, APRN  9/15/2022  10:00 CDT

## 2022-09-16 DIAGNOSIS — O99.810 ABNORMAL GLUCOSE TOLERANCE TEST (GTT) DURING PREGNANCY, ANTEPARTUM: Primary | ICD-10-CM

## 2022-09-19 PROBLEM — O99.013 ANEMIA DURING PREGNANCY IN THIRD TRIMESTER: Status: ACTIVE | Noted: 2022-09-19

## 2022-09-19 RX ORDER — DOCUSATE SODIUM 100 MG/1
100 CAPSULE, LIQUID FILLED ORAL 2 TIMES DAILY
Qty: 60 CAPSULE | Refills: 1 | Status: SHIPPED | OUTPATIENT
Start: 2022-09-19 | End: 2022-12-05 | Stop reason: HOSPADM

## 2022-09-19 RX ORDER — FERROUS SULFATE 325(65) MG
325 TABLET ORAL 2 TIMES DAILY WITH MEALS
Qty: 60 TABLET | Refills: 3 | Status: SHIPPED | OUTPATIENT
Start: 2022-09-19 | End: 2023-01-12

## 2022-09-22 ENCOUNTER — LAB (OUTPATIENT)
Dept: LAB | Facility: HOSPITAL | Age: 22
End: 2022-09-22

## 2022-09-22 DIAGNOSIS — O99.810 ABNORMAL GLUCOSE TOLERANCE TEST (GTT) DURING PREGNANCY, ANTEPARTUM: ICD-10-CM

## 2022-09-22 LAB
GLUCOSE P FAST SERPL-MCNC: 81 MG/DL (ref 65–94)
GTT GEST 2H PNL UR+SERPL: 142 MG/DL (ref 65–179)
GTT GEST 3H PNL SERPL: 149 MG/DL (ref 65–154)
GTT GEST 3H PNL SERPL: 83 MG/DL (ref 65–139)

## 2022-09-22 PROCEDURE — 82951 GLUCOSE TOLERANCE TEST (GTT): CPT

## 2022-09-22 PROCEDURE — 82952 GTT-ADDED SAMPLES: CPT

## 2022-09-22 PROCEDURE — 36415 COLL VENOUS BLD VENIPUNCTURE: CPT

## 2022-09-29 ENCOUNTER — ROUTINE PRENATAL (OUTPATIENT)
Dept: OBSTETRICS AND GYNECOLOGY | Facility: CLINIC | Age: 22
End: 2022-09-29

## 2022-09-29 VITALS — WEIGHT: 197 LBS | BODY MASS INDEX: 37.22 KG/M2 | SYSTOLIC BLOOD PRESSURE: 110 MMHG | DIASTOLIC BLOOD PRESSURE: 68 MMHG

## 2022-09-29 DIAGNOSIS — O09.899 RUBELLA NON-IMMUNE STATUS, ANTEPARTUM: ICD-10-CM

## 2022-09-29 DIAGNOSIS — Z28.39 RUBELLA NON-IMMUNE STATUS, ANTEPARTUM: ICD-10-CM

## 2022-09-29 DIAGNOSIS — O99.322 DRUG USE AFFECTING PREGNANCY IN SECOND TRIMESTER: ICD-10-CM

## 2022-09-29 DIAGNOSIS — O09.42 HIGH RISK MULTIGRAVIDA IN SECOND TRIMESTER: ICD-10-CM

## 2022-09-29 DIAGNOSIS — Z78.9 ELECTRONIC CIGARETTE USE: ICD-10-CM

## 2022-09-29 DIAGNOSIS — Z3A.29 29 WEEKS GESTATION OF PREGNANCY: ICD-10-CM

## 2022-09-29 DIAGNOSIS — M54.9 BACK PAIN AFFECTING PREGNANCY, ANTEPARTUM: Primary | ICD-10-CM

## 2022-09-29 DIAGNOSIS — O99.891 BACK PAIN AFFECTING PREGNANCY, ANTEPARTUM: Primary | ICD-10-CM

## 2022-09-29 DIAGNOSIS — O99.013 ANEMIA DURING PREGNANCY IN THIRD TRIMESTER: ICD-10-CM

## 2022-09-29 DIAGNOSIS — F12.10 MARIJUANA ABUSE: ICD-10-CM

## 2022-09-29 DIAGNOSIS — Z86.19 HISTORY OF COLD SORES: ICD-10-CM

## 2022-09-29 PROCEDURE — 0502F SUBSEQUENT PRENATAL CARE: CPT | Performed by: NURSE PRACTITIONER

## 2022-09-29 NOTE — PROGRESS NOTES
CC: Prenatal visit    Yuridia Irizarry is a 21 y.o.  at 29w2d.  Doing well.  Denies dysuria, abnormal vaginal d/c, headaches, heartburn, constipation, cramping, regular contractions, LOF, or VB.  Reports good FM.    /68   Wt 89.4 kg (197 lb)   LMP 2022 (Approximate)   BMI 37.22 kg/m²   SVE: NA    Growth scan reviewed. EFW- 1376g, 52%tile. SHEILA- 11.77cm     Fetal Heart Rate: 154     Problems (from 22 to present)     Problem Noted Resolved    Anemia during pregnancy in third trimester 2022 by Aida Ames APRN No    Overview Signed 2022 10:43 AM by Aida Ames APRN     - start iron BID         Back pain affecting pregnancy, antepartum 2022 by Nuvia Alonso DO No    Overview Signed 2022  9:53 PM by Nuvia Alonso DO     In PT         History of cold sores 2022 by Priyanka Cerna APRN No    Overview Signed 2022  9:10 AM by Priyanka Cerna APRN     +HSV culture. Needs prophylaxis at 36 weeks.          High risk multigravida in second trimester 5/3/2022 by Aida Ames APRN No    Marijuana abuse 5/3/2022 by Aida Ames APRN No    Electronic cigarette use 5/3/2022 by Aida Ames APRN No    Overview Signed 2022  9:40 AM by Aida Ames APRN     Smokes 1 cartridge every 2 weeks; cessation efforts encouraged          Rubella non-immune status, antepartum 2022 by Aida Ames APRN No    Drug use affecting pregnancy in second trimester 2022 by Aida Ames APRN No    Overview Signed 2022 10:09 AM by Aida Ames APRN     THC @ NO[facility]               A/P: Yuridia Irizarry is a 21 y.o.  at 29w2d.  - RTC in 2 weeks  - Reviewed COVID-19 visitation policy  - Reviewed COVID-19 precautions     Diagnosis Plan   1. Back pain affecting pregnancy, antepartum     2. History of cold sores     3. High risk multigravida in second trimester     4. Marijuana abuse     5. Electronic cigarette use     6.  Rubella non-immune status, antepartum     7. Drug use affecting pregnancy in second trimester     8. Anemia during pregnancy in third trimester     9. 29 weeks gestation of pregnancy       Aida Ames, APRN  9/29/2022  15:50 CDT

## 2022-10-05 PROBLEM — J10.1 INFLUENZA A: Status: ACTIVE | Noted: 2022-10-05

## 2022-10-14 ENCOUNTER — ROUTINE PRENATAL (OUTPATIENT)
Dept: OBSTETRICS AND GYNECOLOGY | Facility: CLINIC | Age: 22
End: 2022-10-14

## 2022-10-14 VITALS — BODY MASS INDEX: 37.03 KG/M2 | SYSTOLIC BLOOD PRESSURE: 110 MMHG | DIASTOLIC BLOOD PRESSURE: 72 MMHG | WEIGHT: 196 LBS

## 2022-10-14 DIAGNOSIS — O09.43 HIGH RISK MULTIGRAVIDA IN THIRD TRIMESTER: ICD-10-CM

## 2022-10-14 DIAGNOSIS — O99.891 BACK PAIN AFFECTING PREGNANCY, ANTEPARTUM: Primary | ICD-10-CM

## 2022-10-14 DIAGNOSIS — O99.013 ANEMIA DURING PREGNANCY IN THIRD TRIMESTER: ICD-10-CM

## 2022-10-14 DIAGNOSIS — O09.899 RUBELLA NON-IMMUNE STATUS, ANTEPARTUM: ICD-10-CM

## 2022-10-14 DIAGNOSIS — O99.322 DRUG USE AFFECTING PREGNANCY IN SECOND TRIMESTER: ICD-10-CM

## 2022-10-14 DIAGNOSIS — F12.10 MARIJUANA ABUSE: ICD-10-CM

## 2022-10-14 DIAGNOSIS — Z86.19 HISTORY OF COLD SORES: ICD-10-CM

## 2022-10-14 DIAGNOSIS — Z28.39 RUBELLA NON-IMMUNE STATUS, ANTEPARTUM: ICD-10-CM

## 2022-10-14 DIAGNOSIS — M54.9 BACK PAIN AFFECTING PREGNANCY, ANTEPARTUM: Primary | ICD-10-CM

## 2022-10-14 DIAGNOSIS — Z3A.31 31 WEEKS GESTATION OF PREGNANCY: ICD-10-CM

## 2022-10-14 DIAGNOSIS — Z78.9 ELECTRONIC CIGARETTE USE: ICD-10-CM

## 2022-10-14 PROCEDURE — 99212 OFFICE O/P EST SF 10 MIN: CPT | Performed by: NURSE PRACTITIONER

## 2022-10-14 NOTE — PROGRESS NOTES
CC: Prenatal visit    Yuridia Irizarry is a 21 y.o.  at 31w3d. Denies contractions, LOF, or VB.  Reports good FM. Pt is tired, her 1 year old daughter, Ellen, wakes her up multiple times a night. Recommend that patient check out [employer] on Instagram with help with sleeping training daughter. Pt is unsure of what she desires for birth control; declines tubal sterilization.     /72   Wt 88.9 kg (196 lb)   LMP 2022 (Approximate)   BMI 37.03 kg/m²   SVE: Deferred  Fundal Height (cm): 32 cm  Fetal Heart Rate: 150     Problems (from 22 to present)     Problem Noted Resolved    Anemia during pregnancy in third trimester 2022 by Aida Ames APRN No    Overview Signed 2022 10:43 AM by Aida Ames APRN     - start iron BID         Back pain affecting pregnancy, antepartum 2022 by Nuvia Alonso DO No    Overview Signed 2022  9:53 PM by Nuvia Alonso DO     In PT         History of cold sores 2022 by Priyanka Cerna APRN No    Overview Signed 2022  9:10 AM by Priyanka Cerna APRN     +HSV culture. Needs prophylaxis at 36 weeks.          High risk multigravida in second trimester 5/3/2022 by Aida Ames APRN No    Marijuana abuse 5/3/2022 by Aida Ames APRN No    Electronic cigarette use 5/3/2022 by Aida Ames APRN No    Overview Signed 2022  9:40 AM by Aida Ames APRN     Smokes 1 cartridge every 2 weeks; cessation efforts encouraged          Rubella non-immune status, antepartum 2022 by Aida Ames APRN No    Drug use affecting pregnancy in second trimester 2022 by Aida Ames APRN No    Overview Signed 2022 10:09 AM by Aida Ames APRN     THC @ NO[facility]               A/P: Yuridia Irizarry is a 21 y.o.  at 31w3d.  - PTL and fetal kick count preacutions  - RTC in 2 weeks       Diagnosis Plan   1. Back pain affecting pregnancy, antepartum        2. History of cold  sores        3. High risk multigravida in third trimester        4. Marijuana abuse        5. Electronic cigarette use        6. Rubella non-immune status, antepartum        7. Drug use affecting pregnancy in second trimester        8. Anemia during pregnancy in third trimester        9. 31 weeks gestation of pregnancy          Priyanka Benoit, APRN  10/14/2022  11:47 CDT

## 2022-10-27 ENCOUNTER — HOSPITAL ENCOUNTER (EMERGENCY)
Facility: HOSPITAL | Age: 22
Discharge: HOME | End: 2022-10-28
Attending: FAMILY MEDICINE | Admitting: FAMILY MEDICINE

## 2022-10-27 DIAGNOSIS — R19.7 DIARRHEA, UNSPECIFIED TYPE: Primary | ICD-10-CM

## 2022-10-27 PROCEDURE — 99283 EMERGENCY DEPT VISIT LOW MDM: CPT

## 2022-10-27 PROCEDURE — 87507 IADNA-DNA/RNA PROBE TQ 12-25: CPT | Performed by: FAMILY MEDICINE

## 2022-10-28 ENCOUNTER — ROUTINE PRENATAL (OUTPATIENT)
Dept: OBSTETRICS AND GYNECOLOGY | Facility: CLINIC | Age: 22
End: 2022-10-28

## 2022-10-28 VITALS — SYSTOLIC BLOOD PRESSURE: 100 MMHG | WEIGHT: 197.6 LBS | BODY MASS INDEX: 33.92 KG/M2 | DIASTOLIC BLOOD PRESSURE: 62 MMHG

## 2022-10-28 VITALS
DIASTOLIC BLOOD PRESSURE: 65 MMHG | HEART RATE: 109 BPM | OXYGEN SATURATION: 98 % | BODY MASS INDEX: 33.46 KG/M2 | TEMPERATURE: 97.6 F | WEIGHT: 196 LBS | RESPIRATION RATE: 18 BRPM | HEIGHT: 64 IN | SYSTOLIC BLOOD PRESSURE: 105 MMHG

## 2022-10-28 DIAGNOSIS — Z28.39 RUBELLA NON-IMMUNE STATUS, ANTEPARTUM: ICD-10-CM

## 2022-10-28 DIAGNOSIS — O99.891 BACK PAIN AFFECTING PREGNANCY, ANTEPARTUM: ICD-10-CM

## 2022-10-28 DIAGNOSIS — O09.899 RUBELLA NON-IMMUNE STATUS, ANTEPARTUM: ICD-10-CM

## 2022-10-28 DIAGNOSIS — Z86.19 HISTORY OF COLD SORES: ICD-10-CM

## 2022-10-28 DIAGNOSIS — O09.43 HIGH RISK MULTIGRAVIDA IN THIRD TRIMESTER: Primary | ICD-10-CM

## 2022-10-28 DIAGNOSIS — O99.013 ANEMIA DURING PREGNANCY IN THIRD TRIMESTER: ICD-10-CM

## 2022-10-28 DIAGNOSIS — O99.322 DRUG USE AFFECTING PREGNANCY IN SECOND TRIMESTER: ICD-10-CM

## 2022-10-28 DIAGNOSIS — M54.9 BACK PAIN AFFECTING PREGNANCY, ANTEPARTUM: ICD-10-CM

## 2022-10-28 DIAGNOSIS — Z78.9 ELECTRONIC CIGARETTE USE: ICD-10-CM

## 2022-10-28 DIAGNOSIS — F12.10 MARIJUANA ABUSE: ICD-10-CM

## 2022-10-28 LAB

## 2022-10-28 PROCEDURE — 99214 OFFICE O/P EST MOD 30 MIN: CPT | Performed by: STUDENT IN AN ORGANIZED HEALTH CARE EDUCATION/TRAINING PROGRAM

## 2022-11-11 ENCOUNTER — ROUTINE PRENATAL (OUTPATIENT)
Dept: OBSTETRICS AND GYNECOLOGY | Facility: CLINIC | Age: 22
End: 2022-11-11

## 2022-11-11 VITALS — DIASTOLIC BLOOD PRESSURE: 70 MMHG | WEIGHT: 201 LBS | BODY MASS INDEX: 34.5 KG/M2 | SYSTOLIC BLOOD PRESSURE: 116 MMHG

## 2022-11-11 DIAGNOSIS — O09.42 HIGH RISK MULTIGRAVIDA IN SECOND TRIMESTER: ICD-10-CM

## 2022-11-11 DIAGNOSIS — Z86.19 HISTORY OF COLD SORES: ICD-10-CM

## 2022-11-11 DIAGNOSIS — M54.9 BACK PAIN AFFECTING PREGNANCY, ANTEPARTUM: ICD-10-CM

## 2022-11-11 DIAGNOSIS — F12.10 MARIJUANA ABUSE: ICD-10-CM

## 2022-11-11 DIAGNOSIS — Z28.39 RUBELLA NON-IMMUNE STATUS, ANTEPARTUM: ICD-10-CM

## 2022-11-11 DIAGNOSIS — Z78.9 ELECTRONIC CIGARETTE USE: ICD-10-CM

## 2022-11-11 DIAGNOSIS — O99.891 BACK PAIN AFFECTING PREGNANCY, ANTEPARTUM: ICD-10-CM

## 2022-11-11 DIAGNOSIS — O09.899 RUBELLA NON-IMMUNE STATUS, ANTEPARTUM: ICD-10-CM

## 2022-11-11 DIAGNOSIS — Z36.85 ANTENATAL SCREENING FOR STREPTOCOCCUS B: ICD-10-CM

## 2022-11-11 DIAGNOSIS — O99.013 ANEMIA DURING PREGNANCY IN THIRD TRIMESTER: ICD-10-CM

## 2022-11-11 DIAGNOSIS — Z3A.35 35 WEEKS GESTATION OF PREGNANCY: Primary | ICD-10-CM

## 2022-11-11 DIAGNOSIS — O99.322 DRUG USE AFFECTING PREGNANCY IN SECOND TRIMESTER: ICD-10-CM

## 2022-11-11 PROCEDURE — 0502F SUBSEQUENT PRENATAL CARE: CPT | Performed by: NURSE PRACTITIONER

## 2022-11-11 PROCEDURE — 87653 STREP B DNA AMP PROBE: CPT | Performed by: NURSE PRACTITIONER

## 2022-11-11 NOTE — PROGRESS NOTES
CC: Prenatal visit    Yuridia Irizarry is a 22 y.o.  at 35w3d.  Doing well.  Denies N/V, dysuria, abnormal vaginal d/c, heartburn, constipation, cramping, regular contractions, LOF, or VB.  Reports good FM. C/o of some naila thorne, back pain, some headaches. Feels baby has dropped. Would like to be checked today.     /70   Wt 91.2 kg (201 lb)   LMP 2022 (Approximate)   BMI 34.50 kg/m²   SVE: Deferred  Fundal Height (cm): 36 cm  Fetal Heart Rate: 150     Problems (from 22 to present)     Problem Noted Resolved    Anemia during pregnancy in third trimester 2022 by Aida Ames APRN No    Overview Signed 2022 10:43 AM by Aida Ames APRN     - start iron BID         Back pain affecting pregnancy, antepartum 2022 by Nuvia Alonso DO No    Overview Signed 2022  9:53 PM by Nuvia Alonso DO     In PT         History of cold sores 2022 by Priyanka Cerna APRN No    Overview Addendum 2022 11:24 AM by Priyanka Cerna APRN     +HSV culture on face. No hx of genital herpes. Declines valtrex suppression at 36 weeks.          High risk multigravida in second trimester 5/3/2022 by Aida Ames APRN No    Marijuana abuse 5/3/2022 by Aida Ames APRN No    Electronic cigarette use 5/3/2022 by Aida Ames APRN No    Overview Signed 2022  9:40 AM by Aida Ames APRN     Smokes 1 cartridge every 2 weeks; cessation efforts encouraged          Rubella non-immune status, antepartum 2022 by Aida Ames APRN No    Drug use affecting pregnancy in second trimester 2022 by Aida Ames APRN No    Overview Signed 2022 10:09 AM by Aida Ames APRN     THC @ NOB               A/P: Yuridia Irizarry is a 22 y.o.  at 35w3d.  - GBS swab today  - PTL, fetal kick count, and Pre-E precautions. Increase water intake, tylenol PRN,  and use pregnancy belt to help with pregnancy discomforts.  - Declined  valtrex suppression for hx of cold sores; no hx of genital outbreaks.   - RTC in 1 weeks       Diagnosis Plan   1. 35 weeks gestation of pregnancy        2. Back pain affecting pregnancy, antepartum        3. History of cold sores        4. High risk multigravida in second trimester        5. Marijuana abuse        6. Electronic cigarette use        7. Rubella non-immune status, antepartum        8. Drug use affecting pregnancy in second trimester        9. Anemia during pregnancy in third trimester        10.  screening for streptococcus B  Group B Strep (Molecular) - Swab, Vaginal/Rectum        Priyanka Benoit, ROSALES  2022  12:25 CST

## 2022-11-12 LAB — GROUP B STREP, DNA: NEGATIVE

## 2022-11-18 ENCOUNTER — ROUTINE PRENATAL (OUTPATIENT)
Dept: OBSTETRICS AND GYNECOLOGY | Facility: CLINIC | Age: 22
End: 2022-11-18

## 2022-11-18 VITALS — SYSTOLIC BLOOD PRESSURE: 118 MMHG | BODY MASS INDEX: 36.05 KG/M2 | DIASTOLIC BLOOD PRESSURE: 60 MMHG | WEIGHT: 210 LBS

## 2022-11-18 DIAGNOSIS — Z78.9 ELECTRONIC CIGARETTE USE: ICD-10-CM

## 2022-11-18 DIAGNOSIS — O99.322 DRUG USE AFFECTING PREGNANCY IN SECOND TRIMESTER: ICD-10-CM

## 2022-11-18 DIAGNOSIS — F12.10 MARIJUANA ABUSE: ICD-10-CM

## 2022-11-18 DIAGNOSIS — Z3A.36 36 WEEKS GESTATION OF PREGNANCY: ICD-10-CM

## 2022-11-18 DIAGNOSIS — Z28.39 RUBELLA NON-IMMUNE STATUS, ANTEPARTUM: ICD-10-CM

## 2022-11-18 DIAGNOSIS — O09.43 HIGH RISK MULTIGRAVIDA IN THIRD TRIMESTER: ICD-10-CM

## 2022-11-18 DIAGNOSIS — M54.9 BACK PAIN AFFECTING PREGNANCY, ANTEPARTUM: Primary | ICD-10-CM

## 2022-11-18 DIAGNOSIS — O99.013 ANEMIA DURING PREGNANCY IN THIRD TRIMESTER: ICD-10-CM

## 2022-11-18 DIAGNOSIS — O99.891 BACK PAIN AFFECTING PREGNANCY, ANTEPARTUM: Primary | ICD-10-CM

## 2022-11-18 DIAGNOSIS — O09.899 RUBELLA NON-IMMUNE STATUS, ANTEPARTUM: ICD-10-CM

## 2022-11-18 PROCEDURE — 0502F SUBSEQUENT PRENATAL CARE: CPT | Performed by: NURSE PRACTITIONER

## 2022-11-18 NOTE — PROGRESS NOTES
CC: Prenatal visit    Yuridia Irizarry is a 22 y.o.  at 36w3d.  Doing well.  Denies N/V, dysuria, abnormal vaginal d/c, headaches, heartburn, constipation, regular contractions, LOF, or VB.  Reports good FM. C/O infrequent period cramping and wants her cervix checked today.     /60   Wt 95.3 kg (210 lb)   LMP 2022 (Approximate)   BMI 36.05 kg/m²   SVE: 1/30/-3  Fundal Height (cm): 37 cm  Fetal Heart Rate: 146     Problems (from 22 to present)     Problem Noted Resolved    Anemia during pregnancy in third trimester 2022 by Aida Ames APRN No    Overview Signed 2022 10:43 AM by Aida Ames APRN     - start iron BID         Back pain affecting pregnancy, antepartum 2022 by Nuvia Alonso DO No    Overview Signed 2022  9:53 PM by Nuvia Alonso DO     In PT         History of cold sores 2022 by Priyanka Cerna APRN No    Overview Addendum 2022 11:24 AM by Priyanka Cerna APRN     +HSV culture on face. No hx of genital herpes. Declines valtrex suppression at 36 weeks.          High risk multigravida in third trimester 5/3/2022 by Aida Ames APRN No    Marijuana abuse 5/3/2022 by Aida Ames APRN No    Electronic cigarette use 5/3/2022 by Aida Ames APRN No    Overview Signed 2022  9:40 AM by Aida Ames APRN     Smokes 1 cartridge every 2 weeks; cessation efforts encouraged          Rubella non-immune status, antepartum 2022 by Aida Ames APRN No    Drug use affecting pregnancy in second trimester 2022 by Aida Ames APRN No    Overview Signed 2022 10:09 AM by Aida Ames APRN     THC @ NOB               A/P: Yuridia Irizarry is a 22 y.o.  at 36w3d.  - RTC in 1 weeks  - GBS are negative  - Reviewed COVID-19 visitation policy  - Reviewed COVID-19 precautions     Diagnosis Plan   1. Back pain affecting pregnancy, antepartum  Declined PT. Managing well enough with  heat, rest and tylenol.      2. 36 weeks gestation of pregnancy  PTL precautions reviewed.      3. High risk multigravida in third trimester        4. Marijuana abuse        5. Electronic cigarette use  Has not quit and does not plan to.      6. Rubella non-immune status, antepartum        7. Drug use affecting pregnancy in second trimester        8. Anemia during pregnancy in third trimester  Continue taking oral iron supplement.      Scribed for ROSALES Spence by ROSALES Brock. 11/18/2022  10:35 CST     I, ROSALES Spence, personally performed the services described in this documentation as scribed by the above named individual in my presence, and it is both accurate and complete.  11/29/2022  13:51 CST      ROSALES Spence  11/18/2022  10:35 CST

## 2022-11-23 ENCOUNTER — ROUTINE PRENATAL (OUTPATIENT)
Dept: OBSTETRICS AND GYNECOLOGY | Facility: CLINIC | Age: 22
End: 2022-11-23

## 2022-11-23 VITALS — DIASTOLIC BLOOD PRESSURE: 70 MMHG | BODY MASS INDEX: 35.7 KG/M2 | WEIGHT: 208 LBS | SYSTOLIC BLOOD PRESSURE: 120 MMHG

## 2022-11-23 DIAGNOSIS — O99.891 BACK PAIN AFFECTING PREGNANCY, ANTEPARTUM: ICD-10-CM

## 2022-11-23 DIAGNOSIS — O99.013 ANEMIA DURING PREGNANCY IN THIRD TRIMESTER: ICD-10-CM

## 2022-11-23 DIAGNOSIS — Z78.9 ELECTRONIC CIGARETTE USE: ICD-10-CM

## 2022-11-23 DIAGNOSIS — Z28.39 RUBELLA NON-IMMUNE STATUS, ANTEPARTUM: ICD-10-CM

## 2022-11-23 DIAGNOSIS — O09.899 RUBELLA NON-IMMUNE STATUS, ANTEPARTUM: ICD-10-CM

## 2022-11-23 DIAGNOSIS — M54.9 BACK PAIN AFFECTING PREGNANCY, ANTEPARTUM: ICD-10-CM

## 2022-11-23 DIAGNOSIS — O09.43 HIGH RISK MULTIGRAVIDA IN THIRD TRIMESTER: ICD-10-CM

## 2022-11-23 DIAGNOSIS — O99.322 DRUG USE AFFECTING PREGNANCY IN SECOND TRIMESTER: ICD-10-CM

## 2022-11-23 DIAGNOSIS — Z3A.37 37 WEEKS GESTATION OF PREGNANCY: Primary | ICD-10-CM

## 2022-11-23 DIAGNOSIS — Z86.19 HISTORY OF COLD SORES: ICD-10-CM

## 2022-11-23 DIAGNOSIS — F12.10 MARIJUANA ABUSE: ICD-10-CM

## 2022-11-23 PROCEDURE — 0502F SUBSEQUENT PRENATAL CARE: CPT | Performed by: NURSE PRACTITIONER

## 2022-11-23 NOTE — PROGRESS NOTES
CC: Prenatal visit    Yuridia Irizarry is a 22 y.o.  at 37w1d.  Doing well.  Denies N/V, dysuria, abnormal vaginal d/c, headaches, heartburn, constipation, regular contractions, LOF, or VB.  Reports good FM. Did have some diarrhea last night accompanied with cramping but none today. Pt is going to consider EIOL at 39 weeks after discussing with partner.     /70   Wt 94.3 kg (208 lb)   LMP 2022 (Approximate)   BMI 35.70 kg/m²   SVE: Deferred  Fundal Height (cm): 37 cm  Fetal Heart Rate: 136     Problems (from 22 to present)     Problem Noted Resolved    Anemia during pregnancy in third trimester 2022 by Aida Ames APRN No    Overview Signed 2022 10:43 AM by Aida Ames APRN     - start iron BID         Back pain affecting pregnancy, antepartum 2022 by Nuvia Alonso DO No    Overview Signed 2022  9:53 PM by Nuvia Alonso DO     In PT         History of cold sores 2022 by Priyanka Cerna APRN No    Overview Addendum 2022 11:24 AM by Priyanka Cerna APRN     +HSV culture on face. No hx of genital herpes. Declines valtrex suppression at 36 weeks.          High risk multigravida in third trimester 5/3/2022 by Aida Ames APRN No    Marijuana abuse 5/3/2022 by Aida Ames APRN No    Electronic cigarette use 5/3/2022 by Aida Ames APRN No    Overview Signed 2022  9:40 AM by Aida Ames APRN     Smokes 1 cartridge every 2 weeks; cessation efforts encouraged          Rubella non-immune status, antepartum 2022 by Aida Ames APRN No    Drug use affecting pregnancy in second trimester 2022 by Aida Ames APRN No    Overview Signed 2022 10:09 AM by Aida Ames APRN     THC @ NOB               A/P: Yuridia Irizarry is a 22 y.o.  at 37w1d.  - Labor and fetal kick count precautions  - RTC in 1 weeks       Diagnosis Plan   1. 37 weeks gestation of pregnancy        2. Back pain  affecting pregnancy, antepartum        3. History of cold sores        4. High risk multigravida in third trimester        5. Marijuana abuse        6. Electronic cigarette use        7. Rubella non-immune status, antepartum        8. Drug use affecting pregnancy in second trimester        9. Anemia during pregnancy in third trimester          Priyanka Benoit, APRN  11/23/2022  14:34 CST

## 2022-11-29 ENCOUNTER — HOSPITAL ENCOUNTER (OUTPATIENT)
Facility: HOSPITAL | Age: 22
Discharge: HOME | End: 2022-11-29
Attending: STUDENT IN AN ORGANIZED HEALTH CARE EDUCATION/TRAINING PROGRAM | Admitting: STUDENT IN AN ORGANIZED HEALTH CARE EDUCATION/TRAINING PROGRAM

## 2022-11-29 VITALS
HEART RATE: 103 BPM | OXYGEN SATURATION: 97 % | RESPIRATION RATE: 18 BRPM | SYSTOLIC BLOOD PRESSURE: 126 MMHG | TEMPERATURE: 98.4 F | DIASTOLIC BLOOD PRESSURE: 73 MMHG

## 2022-11-29 LAB
A1 MICROGLOB PLACENTAL VAG QL: NEGATIVE
BACTERIA UR QL AUTO: ABNORMAL /HPF
BILIRUB UR QL STRIP: ABNORMAL
CANDIDA ALBICANS: NEGATIVE
CLARITY UR: ABNORMAL
COLOR UR: ABNORMAL
GARDNERELLA VAGINALIS: NEGATIVE
GLUCOSE UR STRIP-MCNC: NEGATIVE MG/DL
HGB UR QL STRIP.AUTO: NEGATIVE
HYALINE CASTS UR QL AUTO: ABNORMAL /LPF
KETONES UR QL STRIP: ABNORMAL
LEUKOCYTE ESTERASE UR QL STRIP.AUTO: ABNORMAL
MUCOUS THREADS URNS QL MICRO: ABNORMAL /HPF
NITRITE UR QL STRIP: NEGATIVE
PH UR STRIP.AUTO: 5.5 [PH] (ref 5–9)
PROT UR QL STRIP: ABNORMAL
RBC # UR STRIP: ABNORMAL /HPF
REF LAB TEST METHOD: ABNORMAL
SP GR UR STRIP: 1.03 (ref 1–1.03)
SQUAMOUS #/AREA URNS HPF: ABNORMAL /HPF
T VAGINALIS DNA VAG QL PROBE+SIG AMP: NEGATIVE
TRANS CELLS #/AREA URNS HPF: ABNORMAL /HPF
UROBILINOGEN UR QL STRIP: ABNORMAL
WBC # UR STRIP: ABNORMAL /HPF

## 2022-11-29 PROCEDURE — G0463 HOSPITAL OUTPT CLINIC VISIT: HCPCS

## 2022-11-29 PROCEDURE — 81001 URINALYSIS AUTO W/SCOPE: CPT | Performed by: STUDENT IN AN ORGANIZED HEALTH CARE EDUCATION/TRAINING PROGRAM

## 2022-11-29 PROCEDURE — 87086 URINE CULTURE/COLONY COUNT: CPT | Performed by: STUDENT IN AN ORGANIZED HEALTH CARE EDUCATION/TRAINING PROGRAM

## 2022-11-29 PROCEDURE — 87510 GARDNER VAG DNA DIR PROBE: CPT | Performed by: STUDENT IN AN ORGANIZED HEALTH CARE EDUCATION/TRAINING PROGRAM

## 2022-11-29 PROCEDURE — 84112 EVAL AMNIOTIC FLUID PROTEIN: CPT | Performed by: STUDENT IN AN ORGANIZED HEALTH CARE EDUCATION/TRAINING PROGRAM

## 2022-11-29 PROCEDURE — 59025 FETAL NON-STRESS TEST: CPT

## 2022-11-29 PROCEDURE — 87480 CANDIDA DNA DIR PROBE: CPT | Performed by: STUDENT IN AN ORGANIZED HEALTH CARE EDUCATION/TRAINING PROGRAM

## 2022-11-29 PROCEDURE — 87660 TRICHOMONAS VAGIN DIR PROBE: CPT | Performed by: STUDENT IN AN ORGANIZED HEALTH CARE EDUCATION/TRAINING PROGRAM

## 2022-11-30 ENCOUNTER — TELEPHONE (OUTPATIENT)
Dept: OBSTETRICS AND GYNECOLOGY | Facility: CLINIC | Age: 22
End: 2022-11-30

## 2022-11-30 LAB — BACTERIA SPEC AEROBE CULT: NO GROWTH

## 2022-11-30 NOTE — TELEPHONE ENCOUNTER
Patient called and stated that she is having some weird discharge, she believes is her mucus plug. She wanted to know if she needs to come back in or not. She has already been to L&D yesterday because she thought her water had broke and it hadn't. I let her know that it is probably just her mucus plug. It can grow back and she can lose it multiple times. I asked her if her water had broke or if she was constantly leaking fluid and she denied this. She stated it was just the discharge and the pressure from being dilated. I let her know that it was completely normal. If her water does break or she has any leaking of fluid then she would need to come in. She verbalized her understanding.

## 2022-12-02 ENCOUNTER — ROUTINE PRENATAL (OUTPATIENT)
Dept: OBSTETRICS AND GYNECOLOGY | Facility: CLINIC | Age: 22
End: 2022-12-02

## 2022-12-02 ENCOUNTER — TELEPHONE (OUTPATIENT)
Dept: OBSTETRICS AND GYNECOLOGY | Facility: CLINIC | Age: 22
End: 2022-12-02

## 2022-12-02 VITALS — BODY MASS INDEX: 35.7 KG/M2 | DIASTOLIC BLOOD PRESSURE: 76 MMHG | SYSTOLIC BLOOD PRESSURE: 108 MMHG | WEIGHT: 208 LBS

## 2022-12-02 DIAGNOSIS — O99.013 ANEMIA DURING PREGNANCY IN THIRD TRIMESTER: ICD-10-CM

## 2022-12-02 DIAGNOSIS — O09.899 RUBELLA NON-IMMUNE STATUS, ANTEPARTUM: ICD-10-CM

## 2022-12-02 DIAGNOSIS — Z78.9 ELECTRONIC CIGARETTE USE: ICD-10-CM

## 2022-12-02 DIAGNOSIS — O99.322 DRUG USE AFFECTING PREGNANCY IN SECOND TRIMESTER: ICD-10-CM

## 2022-12-02 DIAGNOSIS — Z86.19 HISTORY OF COLD SORES: ICD-10-CM

## 2022-12-02 DIAGNOSIS — Z28.39 RUBELLA NON-IMMUNE STATUS, ANTEPARTUM: ICD-10-CM

## 2022-12-02 DIAGNOSIS — F12.10 MARIJUANA ABUSE: ICD-10-CM

## 2022-12-02 DIAGNOSIS — O09.43 HIGH RISK MULTIGRAVIDA IN THIRD TRIMESTER: ICD-10-CM

## 2022-12-02 DIAGNOSIS — Z3A.38 38 WEEKS GESTATION OF PREGNANCY: Primary | ICD-10-CM

## 2022-12-02 DIAGNOSIS — O99.891 BACK PAIN AFFECTING PREGNANCY, ANTEPARTUM: ICD-10-CM

## 2022-12-02 DIAGNOSIS — M54.9 BACK PAIN AFFECTING PREGNANCY, ANTEPARTUM: ICD-10-CM

## 2022-12-02 PROCEDURE — 0502F SUBSEQUENT PRENATAL CARE: CPT | Performed by: NURSE PRACTITIONER

## 2022-12-02 NOTE — PROGRESS NOTES
CC: Prenatal visit    Yuridia Irizarry is a 22 y.o.  at 38w3d.  Doing well.  Denies N/V, dysuria, abnormal vaginal d/c, headaches, heartburn, constipation, cramping, regular contractions, LOF, or VB.  Reports good FM. Strongly desires an IOL. States she was told she was 3-4 cm on L&D the other day.     /76   Wt 94.3 kg (208 lb)   LMP 2022 (Approximate)   BMI 35.70 kg/m²   SVE: 2/30/-3  Fundal Height (cm): 38 cm  Fetal Heart Rate: 140     Problems (from 22 to present)     Problem Noted Resolved    Anemia during pregnancy in third trimester 2022 by Aida Ames APRN No    Overview Signed 2022 10:43 AM by Aida Ames APRN     - start iron BID         Back pain affecting pregnancy, antepartum 2022 by Nuvia Alonso DO No    Overview Signed 2022  9:53 PM by Nuvia Alonso DO     In PT         History of cold sores 2022 by Priyanka Cerna APRN No    Overview Addendum 2022 11:24 AM by Priyanka Cerna APRN     +HSV culture on face. No hx of genital herpes. Declines valtrex suppression at 36 weeks.          High risk multigravida in third trimester 5/3/2022 by Aida Ames APRN No    Marijuana abuse 5/3/2022 by Aida Ames APRN No    Electronic cigarette use 5/3/2022 by Aida Ames APRN No    Overview Signed 2022  9:40 AM by Aida Ames APRN     Smokes 1 cartridge every 2 weeks; cessation efforts encouraged          Rubella non-immune status, antepartum 2022 by Aida Ames APRN No    Drug use affecting pregnancy in second trimester 2022 by Aida Ames APRN No    Overview Signed 2022 10:09 AM by Aida Ames APRN     THC @ [facility]B               A/P: Yuridia Irizarry is a 22 y.o.  at 38w3d.  - IOL schedule for  at 5am.  - Labor and fetal kick count precautions  - RTC in 2 weeks  - Reviewed COVID-19 visitation policy  - Reviewed COVID-19 precautions     Diagnosis Plan   1. 38  weeks gestation of pregnancy        2. Back pain affecting pregnancy, antepartum        3. History of cold sores        4. High risk multigravida in third trimester        5. Marijuana abuse        6. Electronic cigarette use        7. Rubella non-immune status, antepartum        8. Drug use affecting pregnancy in second trimester        9. Anemia during pregnancy in third trimester          Priyanka Benoit, APRN  12/2/2022  11:50 CST

## 2022-12-04 ENCOUNTER — HOSPITAL ENCOUNTER (INPATIENT)
Facility: HOSPITAL | Age: 22
LOS: 1 days | Discharge: HOME | End: 2022-12-05
Attending: OBSTETRICS & GYNECOLOGY | Admitting: OBSTETRICS & GYNECOLOGY

## 2022-12-04 ENCOUNTER — ANESTHESIA EVENT (OUTPATIENT)
Dept: LABOR AND DELIVERY | Facility: HOSPITAL | Age: 22
End: 2022-12-04

## 2022-12-04 ENCOUNTER — ANESTHESIA (OUTPATIENT)
Dept: LABOR AND DELIVERY | Facility: HOSPITAL | Age: 22
End: 2022-12-04

## 2022-12-04 PROBLEM — Z37.9 NORMAL LABOR: Status: ACTIVE | Noted: 2022-12-04

## 2022-12-04 PROBLEM — Z3A.38 38 WEEKS GESTATION OF PREGNANCY: Status: ACTIVE | Noted: 2022-12-04

## 2022-12-04 LAB
ABO GROUP BLD: NORMAL
AMPHET+METHAMPHET UR QL: NEGATIVE
AMPHETAMINES UR QL: NEGATIVE
BARBITURATES UR QL SCN: NEGATIVE
BENZODIAZ UR QL SCN: NEGATIVE
BLD GP AB SCN SERPL QL: NEGATIVE
BUPRENORPHINE SERPL-MCNC: NEGATIVE NG/ML
CANNABINOIDS SERPL QL: NEGATIVE
COCAINE UR QL: NEGATIVE
DEPRECATED RDW RBC AUTO: 39.3 FL (ref 37–54)
ERYTHROCYTE [DISTWIDTH] IN BLOOD BY AUTOMATED COUNT: 13.4 % (ref 12.3–15.4)
HCT VFR BLD AUTO: 29.7 % (ref 34–46.6)
HGB BLD-MCNC: 9.5 G/DL (ref 12–15.9)
Lab: NORMAL
MCH RBC QN AUTO: 26.2 PG (ref 26.6–33)
MCHC RBC AUTO-ENTMCNC: 32 G/DL (ref 31.5–35.7)
MCV RBC AUTO: 81.8 FL (ref 79–97)
METHADONE UR QL SCN: NEGATIVE
OPIATES UR QL: NEGATIVE
OXYCODONE UR QL SCN: NEGATIVE
PCP UR QL SCN: NEGATIVE
PLATELET # BLD AUTO: 309 10*3/MM3 (ref 140–450)
PMV BLD AUTO: 10.1 FL (ref 6–12)
PROPOXYPH UR QL: NEGATIVE
RBC # BLD AUTO: 3.63 10*6/MM3 (ref 3.77–5.28)
RH BLD: POSITIVE
T&S EXPIRATION DATE: NORMAL
TRICYCLICS UR QL SCN: NEGATIVE
WBC NRBC COR # BLD: 9.63 10*3/MM3 (ref 3.4–10.8)

## 2022-12-04 PROCEDURE — 86900 BLOOD TYPING SEROLOGIC ABO: CPT | Performed by: OBSTETRICS & GYNECOLOGY

## 2022-12-04 PROCEDURE — C1755 CATHETER, INTRASPINAL: HCPCS

## 2022-12-04 PROCEDURE — 86901 BLOOD TYPING SEROLOGIC RH(D): CPT | Performed by: OBSTETRICS & GYNECOLOGY

## 2022-12-04 PROCEDURE — 88307 TISSUE EXAM BY PATHOLOGIST: CPT

## 2022-12-04 PROCEDURE — 51703 INSERT BLADDER CATH COMPLEX: CPT

## 2022-12-04 PROCEDURE — 86850 RBC ANTIBODY SCREEN: CPT | Performed by: OBSTETRICS & GYNECOLOGY

## 2022-12-04 PROCEDURE — 59400 OBSTETRICAL CARE: CPT | Performed by: OBSTETRICS & GYNECOLOGY

## 2022-12-04 PROCEDURE — 80306 DRUG TEST PRSMV INSTRMNT: CPT | Performed by: OBSTETRICS & GYNECOLOGY

## 2022-12-04 PROCEDURE — 25010000002 FENTANYL CITRATE (PF) 100 MCG/2ML SOLUTION: Performed by: NURSE ANESTHETIST, CERTIFIED REGISTERED

## 2022-12-04 PROCEDURE — C1755 CATHETER, INTRASPINAL: HCPCS | Performed by: NURSE ANESTHETIST, CERTIFIED REGISTERED

## 2022-12-04 PROCEDURE — 85027 COMPLETE CBC AUTOMATED: CPT | Performed by: OBSTETRICS & GYNECOLOGY

## 2022-12-04 PROCEDURE — 0503F POSTPARTUM CARE VISIT: CPT | Performed by: OBSTETRICS & GYNECOLOGY

## 2022-12-04 RX ORDER — PRENATAL VIT/IRON FUM/FOLIC AC 27MG-0.8MG
1 TABLET ORAL DAILY
Status: DISCONTINUED | OUTPATIENT
Start: 2022-12-04 | End: 2022-12-05 | Stop reason: HOSPADM

## 2022-12-04 RX ORDER — SODIUM CHLORIDE 0.9 % (FLUSH) 0.9 %
10 SYRINGE (ML) INJECTION AS NEEDED
Status: DISCONTINUED | OUTPATIENT
Start: 2022-12-04 | End: 2022-12-04 | Stop reason: HOSPADM

## 2022-12-04 RX ORDER — HYDROCODONE BITARTRATE AND ACETAMINOPHEN 7.5; 325 MG/1; MG/1
1 TABLET ORAL EVERY 4 HOURS PRN
Status: DISCONTINUED | OUTPATIENT
Start: 2022-12-04 | End: 2022-12-05 | Stop reason: HOSPADM

## 2022-12-04 RX ORDER — MISOPROSTOL 200 UG/1
800 TABLET ORAL ONCE AS NEEDED
Status: DISCONTINUED | OUTPATIENT
Start: 2022-12-04 | End: 2022-12-04 | Stop reason: HOSPADM

## 2022-12-04 RX ORDER — CARBOPROST TROMETHAMINE 250 UG/ML
250 INJECTION, SOLUTION INTRAMUSCULAR ONCE
Status: DISCONTINUED | OUTPATIENT
Start: 2022-12-04 | End: 2022-12-04

## 2022-12-04 RX ORDER — LIDOCAINE HYDROCHLORIDE AND EPINEPHRINE 15; 5 MG/ML; UG/ML
INJECTION, SOLUTION EPIDURAL AS NEEDED
Status: DISCONTINUED | OUTPATIENT
Start: 2022-12-04 | End: 2022-12-04 | Stop reason: SURG

## 2022-12-04 RX ORDER — BISACODYL 10 MG
10 SUPPOSITORY, RECTAL RECTAL DAILY PRN
Status: DISCONTINUED | OUTPATIENT
Start: 2022-12-05 | End: 2022-12-05 | Stop reason: HOSPADM

## 2022-12-04 RX ORDER — IBUPROFEN 800 MG/1
800 TABLET ORAL EVERY 8 HOURS PRN
Status: DISCONTINUED | OUTPATIENT
Start: 2022-12-04 | End: 2022-12-05 | Stop reason: HOSPADM

## 2022-12-04 RX ORDER — CARBOPROST TROMETHAMINE 250 UG/ML
250 INJECTION, SOLUTION INTRAMUSCULAR
Status: DISCONTINUED | OUTPATIENT
Start: 2022-12-04 | End: 2022-12-04 | Stop reason: HOSPADM

## 2022-12-04 RX ORDER — SODIUM CHLORIDE, SODIUM LACTATE, POTASSIUM CHLORIDE, CALCIUM CHLORIDE 600; 310; 30; 20 MG/100ML; MG/100ML; MG/100ML; MG/100ML
125 INJECTION, SOLUTION INTRAVENOUS CONTINUOUS
Status: DISCONTINUED | OUTPATIENT
Start: 2022-12-04 | End: 2022-12-04

## 2022-12-04 RX ORDER — DOCUSATE SODIUM 100 MG/1
100 CAPSULE, LIQUID FILLED ORAL 2 TIMES DAILY
Status: DISCONTINUED | OUTPATIENT
Start: 2022-12-04 | End: 2022-12-05 | Stop reason: HOSPADM

## 2022-12-04 RX ORDER — SODIUM CHLORIDE, SODIUM LACTATE, POTASSIUM CHLORIDE, CALCIUM CHLORIDE 600; 310; 30; 20 MG/100ML; MG/100ML; MG/100ML; MG/100ML
INJECTION, SOLUTION INTRAVENOUS
Status: COMPLETED
Start: 2022-12-04 | End: 2022-12-04

## 2022-12-04 RX ORDER — SODIUM CHLORIDE 0.9 % (FLUSH) 0.9 %
10 SYRINGE (ML) INJECTION EVERY 12 HOURS SCHEDULED
Status: DISCONTINUED | OUTPATIENT
Start: 2022-12-04 | End: 2022-12-04 | Stop reason: HOSPADM

## 2022-12-04 RX ORDER — LIDOCAINE HYDROCHLORIDE 10 MG/ML
5 INJECTION, SOLUTION EPIDURAL; INFILTRATION; INTRACAUDAL; PERINEURAL AS NEEDED
Status: DISCONTINUED | OUTPATIENT
Start: 2022-12-04 | End: 2022-12-04 | Stop reason: HOSPADM

## 2022-12-04 RX ORDER — MISOPROSTOL 200 UG/1
600 TABLET ORAL ONCE
Status: DISCONTINUED | OUTPATIENT
Start: 2022-12-04 | End: 2022-12-04

## 2022-12-04 RX ORDER — OXYTOCIN/0.9 % SODIUM CHLORIDE 30/500 ML
999 PLASTIC BAG, INJECTION (ML) INTRAVENOUS ONCE
Status: DISCONTINUED | OUTPATIENT
Start: 2022-12-04 | End: 2022-12-04 | Stop reason: HOSPADM

## 2022-12-04 RX ORDER — HYDROCORTISONE 25 MG/G
1 CREAM TOPICAL 3 TIMES DAILY PRN
Status: DISCONTINUED | OUTPATIENT
Start: 2022-12-04 | End: 2022-12-05 | Stop reason: HOSPADM

## 2022-12-04 RX ORDER — FENTANYL CITRATE 50 UG/ML
INJECTION, SOLUTION INTRAMUSCULAR; INTRAVENOUS AS NEEDED
Status: DISCONTINUED | OUTPATIENT
Start: 2022-12-04 | End: 2022-12-04 | Stop reason: SURG

## 2022-12-04 RX ORDER — OXYTOCIN/0.9 % SODIUM CHLORIDE 30/500 ML
250 PLASTIC BAG, INJECTION (ML) INTRAVENOUS CONTINUOUS
Status: ACTIVE | OUTPATIENT
Start: 2022-12-04 | End: 2022-12-04

## 2022-12-04 RX ORDER — ACETAMINOPHEN 500 MG
1000 TABLET ORAL EVERY 8 HOURS PRN
Status: DISCONTINUED | OUTPATIENT
Start: 2022-12-04 | End: 2022-12-05 | Stop reason: HOSPADM

## 2022-12-04 RX ORDER — METHYLERGONOVINE MALEATE 0.2 MG/ML
200 INJECTION INTRAVENOUS ONCE AS NEEDED
Status: DISCONTINUED | OUTPATIENT
Start: 2022-12-04 | End: 2022-12-04 | Stop reason: HOSPADM

## 2022-12-04 RX ORDER — SODIUM CHLORIDE 0.9 % (FLUSH) 0.9 %
1-10 SYRINGE (ML) INJECTION AS NEEDED
Status: DISCONTINUED | OUTPATIENT
Start: 2022-12-04 | End: 2022-12-05 | Stop reason: HOSPADM

## 2022-12-04 RX ORDER — DIPHENHYDRAMINE HCL 25 MG
25 CAPSULE ORAL NIGHTLY PRN
Status: DISCONTINUED | OUTPATIENT
Start: 2022-12-04 | End: 2022-12-05 | Stop reason: HOSPADM

## 2022-12-04 RX ADMIN — ACETAMINOPHEN 1000 MG: 500 TABLET ORAL at 13:58

## 2022-12-04 RX ADMIN — SODIUM CHLORIDE, POTASSIUM CHLORIDE, SODIUM LACTATE AND CALCIUM CHLORIDE 125 ML/HR: 600; 310; 30; 20 INJECTION, SOLUTION INTRAVENOUS at 02:10

## 2022-12-04 RX ADMIN — LIDOCAINE HYDROCHLORIDE AND EPINEPHRINE 3 ML: 15; 5 INJECTION, SOLUTION EPIDURAL at 03:22

## 2022-12-04 RX ADMIN — IBUPROFEN 800 MG: 800 TABLET, FILM COATED ORAL at 17:15

## 2022-12-04 RX ADMIN — OXYTOCIN-SODIUM CHLORIDE 0.9% IV SOLN 30 UNIT/500ML 650 ML/HR: 30-0.9/5 SOLUTION at 05:08

## 2022-12-04 RX ADMIN — IBUPROFEN 800 MG: 800 TABLET, FILM COATED ORAL at 08:31

## 2022-12-04 RX ADMIN — SODIUM CHLORIDE, SODIUM LACTATE, POTASSIUM CHLORIDE, CALCIUM CHLORIDE 125 ML/HR: 600; 310; 30; 20 INJECTION, SOLUTION INTRAVENOUS at 02:10

## 2022-12-04 RX ADMIN — DOCUSATE SODIUM 100 MG: 100 CAPSULE, LIQUID FILLED ORAL at 21:05

## 2022-12-04 RX ADMIN — ACETAMINOPHEN 1000 MG: 500 TABLET ORAL at 22:22

## 2022-12-04 RX ADMIN — DOCUSATE SODIUM 100 MG: 100 CAPSULE, LIQUID FILLED ORAL at 08:31

## 2022-12-04 RX ADMIN — Medication 1 TABLET: at 08:31

## 2022-12-04 RX ADMIN — FENTANYL CITRATE 200 MCG: 50 INJECTION, SOLUTION INTRAMUSCULAR; INTRAVENOUS at 03:27

## 2022-12-04 NOTE — H&P
[facility]  Obstetric History and Physical    Chief Complaint   Patient presents with   • Leaking Fluid     Patient states her water broke at 1245.         Subjective:   HPI:    Patient is a 22 y.o. female  currently at 38w5d, who presents with with regular uterine contractions beginning tonight described as pain 8/10 over the uterus radiating the back had spontaneous rupture of membranes about midnight clear denies associated vaginal bleeding she presents at 5 cm is felt to be in spontaneous active labor.    Her prenatal care is through [facility] records reviewed.  Past OB history is noted below.      The following portions of the patients history were reviewed and updated as appropriate:   current medications, allergies, past medical history, past surgical history, past family history, past social history and current problem list.     Prenatal Information:  Prenatal Results     POC Urine Glucose/Protein     Test Value Reference Range Date Time    Urine Glucose        Urine Protein              Initial Prenatal Labs     Test Value Reference Range Date Time    Hemoglobin ^ 11.0 GM/DL 12.0 - 16.0 22 1445       10.4 g/dL 12.0 - 15.9 22 1429       11.9 g/dL 12.0 - 15.9 22 0905    Hematocrit ^ 33.4 % 37.0 - 47.0 22 1445       31.3 % 34.0 - 46.6 22 1429       37.0 % 34.0 - 46.6 22 0905    Platelets ^ 213 THOUS/uL 130 - 400 22 1445       254 10*3/mm3 140 - 450 22 1429       320 10*3/mm3 140 - 450 22 0905    Rubella IgG  <0.90 index Immune >0.99 22 0905    Hepatitis B SAg  Non-Reactive  Non-Reactive 22 0905    Hepatitis C Ab  Non-Reactive  Non-Reactive 22 0905    RPR  Non-Reactive  Non-Reactive 22 0905    ABO  A   22 0905    Rh  Positive   22 0905    Antibody Screen  Negative   22 0905    HIV  Non-Reactive  Non-Reactive 22 0905    Urine Culture  No growth   22 1312       25,000 CFU/mL  Normal Urogenital Tanesha   06/16/22 1501       No growth   04/20/22 0911    Gonorrhea  Negative  Negative 04/20/22 0911    Chlamydia  Negative  Negative 04/20/22 0911    TSH  2.300 uIU/mL 0.270 - 4.200 01/07/21 1551    HgB A1c               2nd and 3rd Trimester     Test Value Reference Range Date Time    Hemoglobin (repeated)  9.5 g/dL 12.0 - 15.9 12/04/22 0211       9.7 g/dL 12.0 - 15.9 09/15/22 1034    Hematocrit (repeated)  29.7 % 34.0 - 46.6 12/04/22 0211       29.8 % 34.0 - 46.6 09/15/22 1034    Platelets   309 10*3/mm3 140 - 450 12/04/22 0211       271 10*3/mm3 140 - 450 09/15/22 1034      ^ 213 THOUS/uL 130 - 400 06/24/22 1445       254 10*3/mm3 140 - 450 06/08/22 1429       320 10*3/mm3 140 - 450 04/20/22 0905    GCT  150 mg/dL 65 - 139 09/15/22 1034       118 mg/dL 65 - 139 05/03/22 1455    Antibody Screen (repeated)        GTT Fasting  81 mg/dL 65 - 94 09/22/22 1001    GTT 1 Hr  142 mg/dL 65 - 179 09/22/22 1037    GTT 2 Hr  149 mg/dL 65 - 154 09/22/22 1139    GTT 3 Hr  83 mg/dL 65 - 139 09/22/22 1344    Group B Strep  Negative  Negative 11/11/22 1110          Drug Screening     Test Value Reference Range Date Time    Amphetamine Screen  Negative  Negative 12/04/22 0211       Negative  Negative 09/15/22 1034       Negative  Negative 04/20/22 0911    Barbiturate Screen  Negative  Negative 12/04/22 0211       Negative  Negative 09/15/22 1034       Negative  Negative 04/20/22 0911    Benzodiazepine Screen  Negative  Negative 12/04/22 0211       Negative  Negative 09/15/22 1034       Negative  Negative 04/20/22 0911    Methadone Screen  Negative  Negative 12/04/22 0211       Negative  Negative 09/15/22 1034       Negative  Negative 04/20/22 0911    Phencyclidine Screen  Negative  Negative 12/04/22 0211       Negative  Negative 09/15/22 1034       Negative  Negative 04/20/22 0911    Opiates Screen  Negative  Negative 12/04/22 0211       Negative  Negative 09/15/22 1034       Negative  Negative 04/20/22 0911     THC Screen  Negative  Negative 12/04/22 0211       Negative  Negative 09/15/22 1034       Positive  Negative 04/20/22 0911    Cocaine Screen  Negative  Negative 12/04/22 0211       Negative  Negative 09/15/22 1034       Negative  Negative 04/20/22 0911    Propoxyphene Screen  Negative  Negative 12/04/22 0211       Negative  Negative 09/15/22 1034       Negative  Negative 04/20/22 0911    Buprenorphine Screen  Negative  Negative 12/04/22 0211       Negative  Negative 09/15/22 1034       Negative  Negative 04/20/22 0911    Methamphetamine Screen  Negative  Negative 12/04/22 0211       Negative  Negative 09/15/22 1034       Negative  Negative 04/20/22 0911    Oxycodone Screen  Negative  Negative 12/04/22 0211       Negative  Negative 09/15/22 1034       Negative  Negative 04/20/22 0911    Tricyclic Antidepressants Screen  Negative  Negative 12/04/22 0211       Negative  Negative 09/15/22 1034       Negative  Negative 04/20/22 0911          Other (Risk screening)     Test Value Reference Range Date Time    Varicella IgG        Parvovirus IgG        CMV IgG        Cystic Fibrosis        Hemoglobin electrophoresis        NIPT        MSAFP-4        AFP (for NTD only)              Legend    ^: Historical                      External Prenatal Results     Pregnancy Outside Results - Transcribed From Office Records - See Scanned Records For Details     Test Value Date Time    ABO  A  04/20/22 0905    Rh  Positive  04/20/22 0905    Antibody Screen  Negative  04/20/22 0905    Varicella IgG  Negative  01/07/21 1551    Rubella  <0.90 index 04/20/22 0905    Hgb  9.5 g/dL 12/04/22 0211       9.7 g/dL 09/15/22 1034      ^ 11.0 GM/DL 06/24/22 1445       10.4 g/dL 06/08/22 1429       11.9 g/dL 04/20/22 0905    Hct  29.7 % 12/04/22 0211       29.8 % 09/15/22 1034      ^ 33.4 % 06/24/22 1445       31.3 % 06/08/22 1429       37.0 % 04/20/22 0905    Glucose Fasting GTT  81 mg/dL 09/22/22 1001    Glucose Tolerance Test 1 hour  142 mg/dL  09/22/22 1037    Glucose Tolerance Test 3 hour  83 mg/dL 09/22/22 1344    Gonorrhea (discrete)  Negative  04/20/22 0911    Chlamydia (discrete)  Negative  04/20/22 0911    RPR  Non-Reactive  04/20/22 0905    VDRL       Syphilis Antibody       HBsAg  Non-Reactive  04/20/22 0905    Herpes Simplex Virus PCR       Herpes Simplex VIrus Culture  Positive  03/04/22 1702    HIV  Non-Reactive  04/20/22 0905    Hep C RNA Quant PCR       Hep C Antibody  Non-Reactive  04/20/22 0905    AFP       Group B Strep  Negative  11/11/22 1110    GBS Susceptibility to Clindamycin       GBS Susceptibility to Erythromycin       Fetal Fibronectin  Negative  06/09/21 1721    Genetic Testing, Maternal Blood             Drug Screening     Test Value Date Time    Urine Drug Screen       Amphetamine Screen  Negative  12/04/22 0211       Negative  09/15/22 1034       Negative  04/20/22 0911    Barbiturate Screen  Negative  12/04/22 0211       Negative  09/15/22 1034       Negative  04/20/22 0911    Benzodiazepine Screen  Negative  12/04/22 0211       Negative  09/15/22 1034       Negative  04/20/22 0911    Methadone Screen  Negative  12/04/22 0211       Negative  09/15/22 1034       Negative  04/20/22 0911    Phencyclidine Screen  Negative  12/04/22 0211       Negative  09/15/22 1034       Negative  04/20/22 0911    Opiates Screen  Negative  12/04/22 0211       Negative  09/15/22 1034       Negative  04/20/22 0911    THC Screen  Negative  12/04/22 0211       Negative  09/15/22 1034       Positive  04/20/22 0911    Cocaine Screen       Propoxyphene Screen  Negative  12/04/22 0211       Negative  09/15/22 1034       Negative  04/20/22 0911    Buprenorphine Screen  Negative  12/04/22 0211       Negative  09/15/22 1034       Negative  04/20/22 0911    Methamphetamine Screen       Oxycodone Screen  Negative  12/04/22 0211       Negative  09/15/22 1034       Negative  04/20/22 0911    Tricyclic Antidepressants Screen  Negative  12/04/22 0211        Negative  09/15/22 1034       Negative  22 0911          Legend    ^: Historical                         Past OB History:     OB History    Para Term  AB Living   2 1 1 0 0 1   SAB IAB Ectopic Molar Multiple Live Births   0 0 0 0 0 1      # Outcome Date GA Lbr Erasmo/2nd Weight Sex Delivery Anes PTL Lv   2 Current            1 Term 21 39w4d 18:56 / 02:45 2730 g (6 lb 0.3 oz) F Vag-Spont EPI N KALEE      Name: KAT JAEGER      Apgar1: 7  Apgar5: 9       Past Medical History: Past Medical History:   Diagnosis Date   • Anemia during pregnancy in third trimester 2022   • Asthma    • Injury of back    • Migraine       Past Surgical History Past Surgical History:   Procedure Laterality Date   • TONSILLECTOMY        Family History: Family History   Problem Relation Age of Onset   • Seizures Father    • Diabetes Mother    • Arthritis Mother    • No Known Problems Sister    • Breast cancer Paternal Grandmother    • Lung disease Paternal Grandfather    • Asthma Sister       Social History:  reports that she has been smoking. She has never used smokeless tobacco.   reports that she does not currently use alcohol.   reports that she does not currently use drugs after having used the following drugs: Marijuana.        General ROS: Pertinent items are noted in HPI    Home Medications:  docusate sodium, ferrous sulfate, norethindrone-ethinyl estradiol FE, and prenatal vitamin 28-0.8    Allergies:  Allergies   Allergen Reactions   • Rocephin [Ceftriaxone] Rash       Objective       Vital Signs Range for the last 24 hours  Temperature: Temp:  [98.6 °F (37 °C)] 98.6 °F (37 °C)   Temp Source: Temp src: Oral   BP: BP: (114)/(66) 114/66   Pulse: Heart Rate:  [] 121   Respirations: Resp:  [18] 18   SPO2: SpO2:  [96 %-97 %] 97 %     Physical Examination:   General appearance - alert, well appearing, and in no distress  Mental status - alert, oriented to person, place, and time  Chest - clear to  auscultation, no wheezes, rales or rhonchi, symmetric air entry  Heart - normal rate, regular rhythm, normal S1, S2, no murmurs, rubs, clicks or gallops  Abdomen - soft, nontender, nondistended, no masses or organomegaly  Pelvic - normal external genitalia, vulva, vagina, cervix, uterus and adnexa  Back exam - full range of motion, no tenderness, palpable spasm or pain on motion  Neurological - alert, oriented, normal speech, no focal findings or movement disorder noted  Extremities - peripheral pulses normal, no pedal edema, no clubbing or cyanosis  Skin - normal coloration and turgor, no rashes, no suspicious skin lesions noted    Presentation:  By report vertex   Cervix: Method: sterile exam per RN   Dilation: Cervical Dilation (cm): 5   Effacement: Cervical Effacement: 70-80%   Station:         Fetal Heart Rate Assessment   Method:     Beats/min:     Baseline:     Variability:     Accels:     Decels:     Tracing Category:       Uterine Assessment   Method:     Frequency (min):     Ctx Count in 10 min:     Duration:     Intensity:     [place] Units:        GBS is  negative 11/11/2022    Assessment & Plan       ASSESSMENT with PLAN:   Active Hospital Problems    Diagnosis  POA   • **Normal labor [O80, Z37.9]  Not Applicable   • Normal labor [O80, Z37.9]  Not Applicable   • 38 weeks gestation of pregnancy [Z3A.38]  Not Applicable   • Anemia during pregnancy in third trimester [O99.013]  Yes     9/19- start iron BID     • History of cold sores [Z86.19]  Yes     +HSV culture on face. No hx of genital herpes. Declines valtrex suppression at 36 weeks.      • Electronic cigarette use [Z78.9]  Yes     Smokes 1 cartridge every 2 weeks; cessation efforts encouraged 8/18     • High risk multigravida in third trimester [O09.43]  Not Applicable   • Marijuana abuse [F12.10]  Yes   • Drug use affecting pregnancy in second trimester [O99.322]  Yes     THC @ [facility]B            The patient and I have discussed pain goals for this  hospitalization after reviewing her current clinical condition, medical history and prior pain experiences.  The goal is to keep her pain level 3.  To help achieve this goal, I plan to multimodal pain management request epidural.        Plan of care has been reviewed with patient and patient agrees.   Risks, benefits of treatment plan have been discussed.  All questions have been answered.        Electronically signed by Emanuel Rosas MD, 12/04/22, 3:06 AM CST.

## 2022-12-04 NOTE — ANESTHESIA PREPROCEDURE EVALUATION
Anesthesia Evaluation     NPO Solid Status: > 8 hours  NPO Liquid Status: < 2 hours           Airway   Mallampati: II  TM distance: >3 FB  Neck ROM: full  no difficulty expected  Dental - normal exam     Pulmonary - normal exam   (+) asthma,  Cardiovascular - normal exam        Neuro/Psych  (+) headaches,    GI/Hepatic/Renal/Endo      Musculoskeletal     Abdominal    Substance History      OB/GYN    (+) Pregnant,         Other                        Anesthesia Plan    ASA 2     epidural       Anesthetic plan, risks, benefits, and alternatives have been provided, discussed and informed consent has been obtained with: patient.        CODE STATUS:

## 2022-12-04 NOTE — ANESTHESIA PROCEDURE NOTES
Labor Epidural      Patient reassessed immediately prior to procedure    Patient location during procedure: OB  Performed By  CRNA/DIANN: Hudson Guerin CRNA  Preanesthetic Checklist  Completed: patient identified, IV checked, site marked, risks and benefits discussed, surgical consent, monitors and equipment checked, pre-op evaluation and timeout performed  Prep:  Pt Position:sitting  Sterile Tech:gloves, cap, gown, mask and sterile barrier  Prep:DuraPrep  Monitoring:blood pressure monitoring and continuous pulse oximetry  Epidural Block Procedure:  Approach:midline  Guidance:landmark technique and palpation technique  Location:L3-L4  Needle Type:Tuohy  Needle Gauge:17 G  Loss of Resistance Medium: air  Loss of Resistance: 6cm  Cath Depth at skin:12 cm  Paresthesia: none  Aspiration:negative  Test Dose:negative  Number of Attempts: 1  Post Assessment:  Dressing:occlusive dressing applied and secured with tape  Pt Tolerance:patient tolerated the procedure well with no apparent complications  Complications:no

## 2022-12-04 NOTE — L&D DELIVERY NOTE
[facility]   Vaginal Delivery Note    Patient Name: Yuridia Irizarry  : 2000  MRN: [id number]    Date of Delivery: 2022     Diagnosis     Pre & Post-Delivery:  Intrauterine pregnancy at 38w5d  Labor status:  Spontaneous active labor    Normal labor    Drug use affecting pregnancy in second trimester    High risk multigravida in third trimester    Marijuana abuse    Electronic cigarette use    History of cold sores    Anemia during pregnancy in third trimester    Normal labor    38 weeks gestation of pregnancy    Labor and delivery complicated by meconium in amniotic fluid             Problem List    Transfer to Postpartum     Review the Delivery Report for details.     Delivery     Delivery: Vaginal, Spontaneous     YOB: 2023    Time of Birth:  Gestational Age 5:04 AM   38w5d     Anesthesia:      Delivering clinician:     Forceps?   No   Vacuum? No    Shoulder dystocia present: No        Delivery narrative: Patient progressed to full dilatation and progressed well during second stage of labor delivered vaginally over intact perineum.  Oral and nasal pharynx suctioned.  Shoulders came without difficulty.  Infant placed on maternal abdomen for Kangaroo care and evaluation by nursing staff.  Cord clamped and delayed fashion and cut by father at maternal request.  Placenta delivered spontaneously appeared intact sent to pathology because of meconium staining.  There were no lacerations or abrasions of the perineum which were felt to need suturing      Infant     Findings: male  infant     Infant observations: Weight: 3270 g (7 lb 3.3 oz)   Length: 20.276  in  Observations/Comments:        Apgars: 8  @ 1 minute /    9  @ 5 minutes          Placenta & Cord         Placenta delivered  Spontaneous  at   2022  5:08 AM     Cord: 3 vessels  present.   Nuchal Cord?  no   Cord blood obtained: Yes    Cord gases obtained:  No    Cord gas results: Venous:  No results found for:  PHCVEN    Arterial:  No results found for: PHCART     Repair      Episiotomy: None     No    Lacerations: No   Estimated Blood Loss:   250cc        :          Complications       Normal labor    Drug use affecting pregnancy in second trimester    High risk multigravida in third trimester    Marijuana abuse    Electronic cigarette use    History of cold sores    Anemia during pregnancy in third trimester    Normal labor    38 weeks gestation of pregnancy    Labor and delivery complicated by meconium in amniotic fluid        Disposition     Mother to Mother Baby/Postpartum  in stable condition currently.  Baby to remains with mom  in stable condition currently.    Emanuel Rosas MD  12/04/22  05:25 CST

## 2022-12-04 NOTE — PLAN OF CARE
Goal Outcome Evaluation:  Plan of Care Reviewed With: patient, spouse, mother        Progress: improving  Outcome Evaluation: VSS, pt denies pain at this time, fundus firm, rubra light

## 2022-12-05 VITALS
TEMPERATURE: 97.7 F | OXYGEN SATURATION: 97 % | RESPIRATION RATE: 16 BRPM | DIASTOLIC BLOOD PRESSURE: 55 MMHG | HEART RATE: 96 BPM | SYSTOLIC BLOOD PRESSURE: 107 MMHG | WEIGHT: 203.71 LBS | BODY MASS INDEX: 38.46 KG/M2 | HEIGHT: 61 IN

## 2022-12-05 LAB
BASOPHILS # BLD AUTO: 0.05 10*3/MM3 (ref 0–0.2)
BASOPHILS NFR BLD AUTO: 0.4 % (ref 0–1.5)
DEPRECATED RDW RBC AUTO: 40.3 FL (ref 37–54)
EOSINOPHIL # BLD AUTO: 0.08 10*3/MM3 (ref 0–0.4)
EOSINOPHIL NFR BLD AUTO: 0.7 % (ref 0.3–6.2)
ERYTHROCYTE [DISTWIDTH] IN BLOOD BY AUTOMATED COUNT: 13.5 % (ref 12.3–15.4)
HCT VFR BLD AUTO: 29.3 % (ref 34–46.6)
HGB BLD-MCNC: 9.6 G/DL (ref 12–15.9)
IMM GRANULOCYTES # BLD AUTO: 0.11 10*3/MM3 (ref 0–0.05)
IMM GRANULOCYTES NFR BLD AUTO: 0.9 % (ref 0–0.5)
LYMPHOCYTES # BLD AUTO: 3.32 10*3/MM3 (ref 0.7–3.1)
LYMPHOCYTES NFR BLD AUTO: 28.6 % (ref 19.6–45.3)
MCH RBC QN AUTO: 27 PG (ref 26.6–33)
MCHC RBC AUTO-ENTMCNC: 32.8 G/DL (ref 31.5–35.7)
MCV RBC AUTO: 82.3 FL (ref 79–97)
MONOCYTES # BLD AUTO: 0.67 10*3/MM3 (ref 0.1–0.9)
MONOCYTES NFR BLD AUTO: 5.8 % (ref 5–12)
NEUTROPHILS NFR BLD AUTO: 63.6 % (ref 42.7–76)
NEUTROPHILS NFR BLD AUTO: 7.39 10*3/MM3 (ref 1.7–7)
NRBC BLD AUTO-RTO: 0 /100 WBC (ref 0–0.2)
PLATELET # BLD AUTO: 273 10*3/MM3 (ref 140–450)
PMV BLD AUTO: 10 FL (ref 6–12)
RBC # BLD AUTO: 3.56 10*6/MM3 (ref 3.77–5.28)
WBC NRBC COR # BLD: 11.62 10*3/MM3 (ref 3.4–10.8)

## 2022-12-05 PROCEDURE — 92650 AEP SCR AUDITORY POTENTIAL: CPT

## 2022-12-05 PROCEDURE — 85025 COMPLETE CBC W/AUTO DIFF WBC: CPT | Performed by: OBSTETRICS & GYNECOLOGY

## 2022-12-05 RX ORDER — IBUPROFEN 800 MG/1
800 TABLET ORAL EVERY 8 HOURS PRN
Qty: 90 TABLET | Refills: 0 | Status: SHIPPED | OUTPATIENT
Start: 2022-12-05 | End: 2023-01-12

## 2022-12-05 RX ORDER — ACETAMINOPHEN 500 MG
1000 TABLET ORAL EVERY 8 HOURS PRN
Qty: 90 TABLET | Refills: 1 | Status: SHIPPED | OUTPATIENT
Start: 2022-12-05

## 2022-12-05 RX ADMIN — ACETAMINOPHEN 1000 MG: 500 TABLET ORAL at 11:18

## 2022-12-05 RX ADMIN — IBUPROFEN 800 MG: 800 TABLET, FILM COATED ORAL at 05:01

## 2022-12-05 RX ADMIN — HYDROCODONE BITARTRATE AND ACETAMINOPHEN 1 TABLET: 7.5; 325 TABLET ORAL at 01:53

## 2022-12-05 RX ADMIN — Medication 1 TABLET: at 08:40

## 2022-12-05 RX ADMIN — DOCUSATE SODIUM 100 MG: 100 CAPSULE, LIQUID FILLED ORAL at 08:40

## 2022-12-05 NOTE — PLAN OF CARE
Problem: Adult Inpatient Plan of Care  Goal: Plan of Care Review  Outcome: Ongoing, Progressing  Flowsheets (Taken 12/4/2022 1815)  Progress: improving  Plan of Care Reviewed With: patient  Outcome Evaluation: VSS, Started Breasting Pumping after multiple attempts at latching infant, Sized nipples as 24mm, Pain managed per PRN meds, ambulating, voiding  Goal: Patient-Specific Goal (Individualized)  Outcome: Ongoing, Progressing  Goal: Absence of Hospital-Acquired Illness or Injury  Outcome: Ongoing, Progressing  Intervention: Identify and Manage Fall Risk  Recent Flowsheet Documentation  Taken 12/4/2022 1727 by Nery Lin RN  Safety Promotion/Fall Prevention: safety round/check completed  Intervention: Prevent Skin Injury  Recent Flowsheet Documentation  Taken 12/4/2022 1727 by Nery Lin RN  Body Position: position changed independently  Intervention: Prevent and Manage VTE (Venous Thromboembolism) Risk  Recent Flowsheet Documentation  Taken 12/4/2022 1727 by Nery Lin RN  Activity Management: up ad trevon  VTE Prevention/Management:   sequential compression devices off   patient refused intervention  Goal: Optimal Comfort and Wellbeing  Outcome: Ongoing, Progressing  Intervention: Monitor Pain and Promote Comfort  Recent Flowsheet Documentation  Taken 12/4/2022 1745 by Nery Lin RN  Pain Management Interventions: no interventions per patient request  Taken 12/4/2022 1715 by Nery Lin RN  Pain Management Interventions: see MAR  Taken 12/4/2022 1428 by Nery Lin RN  Pain Management Interventions: no interventions per patient request  Taken 12/4/2022 1358 by Nery Lin RN  Pain Management Interventions: see MAR  Intervention: Provide Person-Centered Care  Recent Flowsheet Documentation  Taken 12/4/2022 1727 by Nery Lin RN  Trust Relationship/Rapport:   care explained   choices provided   emotional support provided   empathic listening provided    questions answered   questions encouraged   reassurance provided   thoughts/feelings acknowledged  Goal: Readiness for Transition of Care  Outcome: Ongoing, Progressing   Goal Outcome Evaluation:

## 2022-12-05 NOTE — PROGRESS NOTES
[facility]  Yuridia Irizarry  : 2000  MRN: [id number]  CSN: [id number]    Postpartum Day #1  Subjective   The patient has no complaints      Objective     Min/max vitals past 24 hours:   Temp  Min: 98 °F (36.7 °C)  Max: 98.3 °F (36.8 °C)  BP  Min: 96/65  Max: 124/67  Pulse  Min: 92  Max: 102  Pulse  Min: 92  Max: 102        Abdomen: soft, nontender; bowel sounds normal; no masses   fundus firm and nontender   Calves: Nontender, no cords palpable   Pelvic: deferred     Lab Results   Component Value Date    WBC 11.62 (H) 2022    HGB 9.6 (L) 2022    HCT 29.3 (L) 2022    MCV 82.3 2022     2022    RH Positive 2022    HEPBSAG Non-Reactive 2022        Assessment   1. Postpartum Day #1 S/P vaginal delivery     Plan   1. Continue routine postpartum care          This document has been electronically signed by Emanuel Rosas MD on 2022 07:57 CST

## 2022-12-05 NOTE — PLAN OF CARE
Problem: Adult Inpatient Plan of Care  Goal: Plan of Care Review  Outcome: Ongoing, Progressing  Flowsheets (Taken 12/4/2022 1815)  Progress: improving  Plan of Care Reviewed With: patient  Outcome Evaluation: VSS, Started Breasting Pumping after multiple attempts at latching infant, Sized nipples as 24mm, Pain managed per PRN meds, ambulating, voiding  Goal: Patient-Specific Goal (Individualized)  Outcome: Ongoing, Progressing  Goal: Absence of Hospital-Acquired Illness or Injury  Outcome: Ongoing, Progressing  Intervention: Identify and Manage Fall Risk  Recent Flowsheet Documentation  Taken 12/4/2022 1727 by Nery Lin RN  Safety Promotion/Fall Prevention: safety round/check completed  Intervention: Prevent Skin Injury  Recent Flowsheet Documentation  Taken 12/4/2022 1727 by Nery Lin RN  Body Position: position changed independently  Intervention: Prevent and Manage VTE (Venous Thromboembolism) Risk  Recent Flowsheet Documentation  Taken 12/4/2022 1727 by Nery Lin RN  Activity Management: up ad trevon  VTE Prevention/Management:   sequential compression devices off   patient refused intervention  Goal: Optimal Comfort and Wellbeing  Outcome: Ongoing, Progressing  Intervention: Monitor Pain and Promote Comfort  Recent Flowsheet Documentation  Taken 12/4/2022 1745 by Nery Lin RN  Pain Management Interventions: no interventions per patient request  Taken 12/4/2022 1715 by Nery Lin RN  Pain Management Interventions: see MAR  Taken 12/4/2022 1428 by Nery Lin RN  Pain Management Interventions: no interventions per patient request  Taken 12/4/2022 1358 by Nery Lin RN  Pain Management Interventions: see MAR  Intervention: Provide Person-Centered Care  Recent Flowsheet Documentation  Taken 12/4/2022 1727 by Nery Lin RN  Trust Relationship/Rapport:   care explained   choices provided   emotional support provided   empathic listening provided    questions answered   questions encouraged   reassurance provided   thoughts/feelings acknowledged  Goal: Readiness for Transition of Care  Outcome: Ongoing, Progressing   Goal Outcome Evaluation:  Plan of Care Reviewed With: patient        Progress: improving  Outcome Evaluation: VSS, Started Breasting Pumping after multiple attempts at latching infant, Sized nipples as 24mm, Pain managed per PRN meds, ambulating, voiding

## 2022-12-05 NOTE — PLAN OF CARE
Problem: Adult Inpatient Plan of Care  Goal: Plan of Care Review  Outcome: Met  Flowsheets (Taken 2022 0848)  Progress: improving  Plan of Care Reviewed With:   patient   spouse  Outcome Evaluation: DC Home  Goal: Patient-Specific Goal (Individualized)  Outcome: Met  Goal: Absence of Hospital-Acquired Illness or Injury  Outcome: Met  Intervention: Identify and Manage Fall Risk  Recent Flowsheet Documentation  Taken 2022 1220 by Ankita Nino RN  Safety Promotion/Fall Prevention: safety round/check completed  Intervention: Prevent Skin Injury  Recent Flowsheet Documentation  Taken 2022 1220 by Ankita Nino RN  Body Position: position changed independently  Intervention: Prevent and Manage VTE (Venous Thromboembolism) Risk  Recent Flowsheet Documentation  Taken 2022 1220 by Ankita Nino RN  Activity Management: activity adjusted per tolerance  Goal: Optimal Comfort and Wellbeing  Outcome: Met  Intervention: Provide Person-Centered Care  Recent Flowsheet Documentation  Taken 2022 1220 by Ankita Nino RN  Trust Relationship/Rapport:   care explained   choices provided   questions answered   questions encouraged   reassurance provided   thoughts/feelings acknowledged  Goal: Readiness for Transition of Care  Outcome: Met     Problem: Adjustment to Role Transition (Postpartum Vaginal Delivery)  Goal: Successful Maternal Role Transition  Outcome: Met     Problem: Bleeding (Postpartum Vaginal Delivery)  Goal: Hemostasis  Outcome: Met     Problem: Infection (Postpartum Vaginal Delivery)  Goal: Absence of Infection Signs/Symptoms  Outcome: Met     Problem: Pain (Postpartum Vaginal Delivery)  Goal: Acceptable Pain Control  Outcome: Met     Problem: Urinary Retention (Postpartum Vaginal Delivery)  Goal: Effective Urinary Elimination  Outcome: Met     Problem: Breastfeeding  Goal: Effective Breastfeeding  Outcome: Met     Problem:  Fall Injury Risk  Goal: Absence of Fall,  Infant Drop and Related Injury  Outcome: Met  Intervention: Promote Injury-Free Environment  Recent Flowsheet Documentation  Taken 12/5/2022 1220 by Ankita Nino, RN  Safety Promotion/Fall Prevention: safety round/check completed   Goal Outcome Evaluation:  Plan of Care Reviewed With: patient, spouse        Progress: improving  Outcome Evaluation: DC Home

## 2022-12-05 NOTE — PROGRESS NOTES
"[facility]  Progress Note - Obstetrics    Name: Yuridia Irizarry  MRN: [id number]  Location: [id number]  Date: 2022  CSN: [id number]       PPD #1 s/p  at 38w5d secondary to   term labor.    Patient seen and examined.  Denies headache, dizziness, chest pain, shortness of breath, nausea, vomiting.  Minimal/moderate lochia.  OOB and ambulating.  Tolerating regular diet.  Voiding without difficulty.  Breast feeding/pumping with supplemental bottle feeds.    PHYSICAL EXAM  BP 96/65 (BP Location: Right arm, Patient Position: Sitting)   Pulse 102   Temp 98 °F (36.7 °C) (Oral)   Resp 18   Ht 154.9 cm (61\")   Wt 92.4 kg (203 lb 11.3 oz)   LMP 2022 (Approximate)   SpO2 96%   Breastfeeding Yes   BMI 38.49 kg/m²   General: AAOx3, no apparent distress.  Lungs: CTA B/L anteriorly, no wheezes, rales, rhonchi.  CV: Acceptable rate, regular rhythm, S1/S2 normal.  Abdomen: +BS, soft, nondistended, uterine fundus firm, nontender, and below umbilicus.  Lower extremities: No bilateral edema.  2+/4+ dorsalis pedis pulses B/L.    Intake/Output Summary (Last 24 hours) at 2022 0644  Last data filed at 2022 0715  Gross per 24 hour   Intake --   Output 55 ml   Net -55 ml       IMPRESSION  Yuridia Irizarry is a 22 y.o.  PPD #1 s/p  at 38w5d secondary to   term labor.  Doing well and recovering appropriately.    PLAN  1.  Postpartum s/p   - Continue routine postpartum care.  - Diet: Pregnancy/breastfeeding  - DVT prophylaxis: SCDs  - Breastfeeding/pumping with supplemental bottle feeds.  - Contraception: expressed no intent for birth control.  - Pharmacy: [facility]  - Discharge to home today.  Follow-up in 2 weeks (telephone visit), 6 weeks.       This document has been electronically signed by George Bettencourt, Medical Student on 2022 06:44 CST.    I have seen and evaluated the patient.  I have discussed the case with the resident. I have " reviewed the notes, assessment and plan, and/or procedures performed by the resident. I concur with the resident’s documentation.        This document has been electronically signed by Emanuel Rosas MD on December 7, 2022 07:24 EST

## 2022-12-05 NOTE — DISCHARGE SUMMARY
[facility]  Yuridia Irizarry  : 2000  MRN: [id number]  CSN: [id number]    Discharge Summary:    Date of Admission: 2022  Date of Discharge:  2022    Admitting Diagnosis:  1. Intrauterine pregnancy @ 38w5d  2. in labor     Discharge Diagnosis:  1. Status post     Normal labor    Drug use affecting pregnancy in second trimester    High risk multigravida in third trimester    Marijuana abuse    Electronic cigarette use    History of cold sores    Anemia during pregnancy in third trimester    Normal labor    38 weeks gestation of pregnancy    Labor and delivery complicated by meconium in amniotic fluid         History and Hospital Course:  Patient is a   who presents in labor.     Please see History and Physical for full details.       She was admitted and progressed in labor to completely dilated. She had a vaginal delivery of a  viable male   infant who weighed 3270 g (7 lb 3.3 oz)  and APGARs of        APGARS  One minute Five minutes Ten minutes Fifteen minutes Twenty minutes   Skin color: 0   1             Heart rate: 2   2             Grimace: 2   2              Muscle tone: 2   2              Breathin   2              Totals: 8   9              . No immediate complications were encountered. Please see procedure note for full details.      Her postpartum course has been unremarkable. She had no signs or symptoms of acute blood loss anemia. She was ambulating well, voiding without difficulty and lochia was within normal limits. She was bottle feeding without difficulty. She was stable for discharge on postpartum day 1.      Precautions and instructions were discussed with her including but not limited to maintaining a regular diet at home, practicing local hygiene, pelvic rest, and signs and symptoms to report including heavy bleeding, frequent passage of clots, fainting or dizziness, foul odor of lochia, increasing pain, fever, or any other concerns.    She was asked to  follow up in the office in 4 weeks.    Condition: Stable  Discharge Disposition: home  Discharge Diet:   Diet Instructions     Diet: Regular      Discharge Diet: Regular        Activity at Discharge:   Activity Instructions     Pelvic Rest          Discharge Medications:       Discharge Medications      New Medications      Instructions Start Date   acetaminophen 500 MG tablet  Commonly known as: TYLENOL   1,000 mg, Oral, Every 8 Hours PRN      ibuprofen 800 MG tablet  Commonly known as: ADVIL,MOTRIN   800 mg, Oral, Every 8 Hours PRN         Changes to Medications      Instructions Start Date   docusate sodium 100 MG capsule  Commonly known as: Colace  What changed: Another medication with the same name was removed. Continue taking this medication, and follow the directions you see here.   100 mg, Oral, 2 Times Daily         Continue These Medications      Instructions Start Date   ferrous sulfate 325 (65 FE) MG tablet   325 mg, Oral, 2 Times Daily With Meals      prenatal vitamin 28-0.8 28-0.8 MG tablet tablet   1 tablet, Oral, Daily           Patient will restart all home medications including prenatal vitamins.        This document has been electronically signed by Emanule Rosas MD on December 5, 2022 07:55 CST

## 2022-12-05 NOTE — PLAN OF CARE
Problem: Adult Inpatient Plan of Care  Goal: Plan of Care Review  Outcome: Ongoing, Progressing  Flowsheets  Taken 12/5/2022 0514 by Tori Caba LPN  Outcome Evaluation: VSS, FF, ambulating, voiding, bottle feeding infant, pumped once  Taken 12/4/2022 1815 by Nery Lin RN  Plan of Care Reviewed With: patient   Goal Outcome Evaluation:              Outcome Evaluation: VSS, FF, ambulating, voiding, bottle feeding infant, pumped once

## 2022-12-08 LAB — REF LAB TEST METHOD: NORMAL

## 2022-12-19 ENCOUNTER — POSTPARTUM VISIT (OUTPATIENT)
Dept: OBSTETRICS AND GYNECOLOGY | Facility: CLINIC | Age: 22
End: 2022-12-19
Payer: COMMERCIAL

## 2022-12-19 VITALS
SYSTOLIC BLOOD PRESSURE: 110 MMHG | HEIGHT: 61 IN | WEIGHT: 190.6 LBS | BODY MASS INDEX: 35.98 KG/M2 | DIASTOLIC BLOOD PRESSURE: 84 MMHG

## 2022-12-19 PROCEDURE — 0503F POSTPARTUM CARE VISIT: CPT | Performed by: STUDENT IN AN ORGANIZED HEALTH CARE EDUCATION/TRAINING PROGRAM

## 2022-12-19 NOTE — PROGRESS NOTES
Postpartum visit      Yuridia Irizarry is a 22 y.o.  s/p Vaginal, Spontaneous on 22 at 38+5 secondary to SROM, labor who presents today for postpartum check.  The patient states she is doing well. She did fall on steps last week, having some low back pain but stable/improving. Did not hit head.  Patient denies postpartum depression.  Menstrual cycles have not resumed, bleeding lightening, intermittent.  Bottlefeeding.  Desires  undecided for contraception. Previously used implant and periods were worse.  She has not resumed sexual intercourse.  Denies bowel or bladder issues.    21 , APGARs 8+9, birth weight 3270 g, male, no lacerations.     PHYSICAL EXAM:    /84 (BP Location: Left arm, Patient Position: Sitting, Cuff Size: Adult)   Ht 154.9 cm (61\")   Wt 86.5 kg (190 lb 9.6 oz)   LMP 2022 (Approximate)   Breastfeeding No   BMI 36.01 kg/m²   Gen: well appearing, NAD  Abdomen: benign, no masses, soft, non-tender.  Extremities: No deep calf tenderness.  Postpartum Depression Screening Questionnaire: 0, no treatment indicated.    IMPRESSION/PLAN: Yuridia Irizarry is a 22 y.o.  s/p Vaginal, Spontaneous on 22.  Doing well.    - Recovered nicely from her delivery  - Contraception: undecided, may not desire hormonal contraception  - RTC 4 weeks for final PP visit  - RNI needs MMR PP  - 21 pap NIL, repeat in 3 years        This document has been electronically signed by Nuvia Alonso DO on 2022 14:48 CST

## 2023-01-09 ENCOUNTER — TELEPHONE (OUTPATIENT)
Dept: OBSTETRICS AND GYNECOLOGY | Facility: CLINIC | Age: 23
End: 2023-01-09

## 2023-01-09 NOTE — TELEPHONE ENCOUNTER
PT called regarding her FMLA. We received it on 1/5/23. PT paid for it on 12/19/22. PT can be reached at number on file with any questions.

## 2023-01-10 NOTE — TELEPHONE ENCOUNTER
Attempted to contact the patient to let her know that her paperwork has been faxed over successfully multiple times.

## 2023-01-10 NOTE — TELEPHONE ENCOUNTER
Patient returned call and I explained to her that I have sent the [facility] paperwork in multiple times. They kept sending it back with a start date of 11/30 when she told me her start date was 12/5. She stated was there anything to do to correct that. I let her know that she would need to talk to them as each time I sent it with the 12/5 date, they returned it wanting me to put the 11/30 date. She verbalized her understanding and stated that she would call and talk to them. I also let her know that they should have multiple copies of the 12/5 and a copy of the 11/5 date as start dates. She verbalized her understanding to that as well.

## 2023-01-12 ENCOUNTER — POSTPARTUM VISIT (OUTPATIENT)
Dept: OBSTETRICS AND GYNECOLOGY | Facility: CLINIC | Age: 23
End: 2023-01-12
Payer: COMMERCIAL

## 2023-01-12 VITALS
HEIGHT: 61 IN | DIASTOLIC BLOOD PRESSURE: 68 MMHG | SYSTOLIC BLOOD PRESSURE: 98 MMHG | BODY MASS INDEX: 36.25 KG/M2 | WEIGHT: 192 LBS

## 2023-01-12 DIAGNOSIS — Z30.011 ENCOUNTER FOR INITIAL PRESCRIPTION OF CONTRACEPTIVE PILLS: ICD-10-CM

## 2023-01-12 PROCEDURE — 0503F POSTPARTUM CARE VISIT: CPT | Performed by: NURSE PRACTITIONER

## 2023-01-12 RX ORDER — NORGESTIMATE AND ETHINYL ESTRADIOL 0.25-0.035
1 KIT ORAL DAILY
Qty: 84 TABLET | Refills: 4 | Status: SHIPPED | OUTPATIENT
Start: 2023-01-12

## 2023-01-12 NOTE — LETTER
January 12, 2023     Patient: Yuridia Irizarry   YOB: 2000   Date of Visit: 1/12/2023       To Whom It May Concern:    It is my medical opinion that Yuridia Irizarry may return to work on 1/16/23.           Sincerely,          This document has been electronically signed by ROSALES Spence on January 12, 2023 16:04 CST        ROSALES Spence    CC: No Recipients

## 2023-01-19 PROBLEM — Z78.9 ELECTRONIC CIGARETTE USE: Status: RESOLVED | Noted: 2022-05-03 | Resolved: 2023-01-19

## 2023-01-19 PROBLEM — O09.899 RUBELLA NON-IMMUNE STATUS, ANTEPARTUM: Status: RESOLVED | Noted: 2022-04-21 | Resolved: 2023-01-19

## 2023-01-19 PROBLEM — M54.9 BACK PAIN AFFECTING PREGNANCY, ANTEPARTUM: Status: RESOLVED | Noted: 2022-07-25 | Resolved: 2023-01-19

## 2023-01-19 PROBLEM — Z28.39 RUBELLA NON-IMMUNE STATUS, ANTEPARTUM: Status: RESOLVED | Noted: 2022-04-21 | Resolved: 2023-01-19

## 2023-01-19 PROBLEM — O99.322 DRUG USE AFFECTING PREGNANCY IN SECOND TRIMESTER: Status: RESOLVED | Noted: 2022-04-20 | Resolved: 2023-01-19

## 2023-01-19 PROBLEM — O99.013 ANEMIA DURING PREGNANCY IN THIRD TRIMESTER: Status: RESOLVED | Noted: 2022-09-19 | Resolved: 2023-01-19

## 2023-01-19 PROBLEM — Z86.19 HISTORY OF COLD SORES: Status: RESOLVED | Noted: 2022-06-09 | Resolved: 2023-01-19

## 2023-01-19 PROBLEM — O99.891 BACK PAIN AFFECTING PREGNANCY, ANTEPARTUM: Status: RESOLVED | Noted: 2022-07-25 | Resolved: 2023-01-19

## 2023-01-19 PROBLEM — F12.10 MARIJUANA ABUSE: Status: RESOLVED | Noted: 2022-05-03 | Resolved: 2023-01-19

## 2023-01-19 PROBLEM — O09.43 HIGH RISK MULTIGRAVIDA IN THIRD TRIMESTER: Status: RESOLVED | Noted: 2022-05-03 | Resolved: 2023-01-19

## 2023-01-19 NOTE — PROGRESS NOTES
"[facility]  Obstetrics  Date of Service: 2023    CC: Postpartum visit      Yuridia Irizarry is a 22 y.o.  s/p Vaginal, Spontaneous on 22 at 38+5 secondary to SROM who presents today for postpartum check.  The patient states she is doing well and is ready to return to work.  Patient denies postpartum depression.  Menstrual cycles have not resumed; stopped pp bleeding about 1 week ago.  Formula feeding.  She has resumed sexual intercourse and denies any issues.  Denies bowel or bladder issues.    PHYSICAL EXAM:    BP 98/68   Ht 154.9 cm (61\")   Wt 87.1 kg (192 lb)   LMP 2022 (Approximate)   Breastfeeding No   BMI 36.28 kg/m²   Breast Exam: Lactating, no masses, skin dimpling, nipple retraction, or nipple discharge bilaterally.  Abdomen: +BS, benign, no masses, soft, non-tender.  Bimanual exam: External genitalia appear normal.  Vagina pink, moist, rugated.  Uterus involvted to normal size and non-tender.  No palpable masses in adnexa.   Extremities: No deep calf tenderness.  Postpartum Depression Screening Questionnaire: 4    IMPRESSION/PLAN: Yuridia Irizarry is a 22 y.o.  s/p Vaginal, Spontaneous on 22.  Doing well.   - Recovered nicely from her delivery  - Contraception: OCP; no insurance so will send Sprintec since it's on $4 list  - May return to all prepregnancy activity  - RTC for annual exam or PRN        This document has been electronically signed by ROSALES Spence on 2023 07:52 CST      "

## 2023-05-08 ENCOUNTER — HOSPITAL ENCOUNTER (EMERGENCY)
Facility: HOSPITAL | Age: 23
Discharge: HOME | End: 2023-05-08
Attending: EMERGENCY MEDICINE | Admitting: EMERGENCY MEDICINE
Payer: COMMERCIAL

## 2023-05-08 ENCOUNTER — APPOINTMENT (OUTPATIENT)
Dept: GENERAL RADIOLOGY | Facility: HOSPITAL | Age: 23
End: 2023-05-08
Payer: COMMERCIAL

## 2023-05-08 ENCOUNTER — APPOINTMENT (OUTPATIENT)
Dept: CT IMAGING | Facility: HOSPITAL | Age: 23
End: 2023-05-08
Payer: COMMERCIAL

## 2023-05-08 VITALS
DIASTOLIC BLOOD PRESSURE: 60 MMHG | SYSTOLIC BLOOD PRESSURE: 107 MMHG | TEMPERATURE: 98.2 F | WEIGHT: 185 LBS | HEART RATE: 101 BPM | RESPIRATION RATE: 18 BRPM | BODY MASS INDEX: 34.93 KG/M2 | HEIGHT: 61 IN | OXYGEN SATURATION: 97 %

## 2023-05-08 DIAGNOSIS — R07.9 CHEST PAIN, UNSPECIFIED TYPE: Primary | ICD-10-CM

## 2023-05-08 DIAGNOSIS — K80.20 CALCULUS OF GALLBLADDER WITHOUT CHOLECYSTITIS WITHOUT OBSTRUCTION: ICD-10-CM

## 2023-05-08 LAB
ALBUMIN SERPL-MCNC: 3.7 G/DL (ref 3.5–5.2)
ALBUMIN/GLOB SERPL: 1.2 G/DL
ALP SERPL-CCNC: 62 U/L (ref 39–117)
ALT SERPL W P-5'-P-CCNC: 25 U/L (ref 1–33)
ANION GAP SERPL CALCULATED.3IONS-SCNC: 9 MMOL/L (ref 5–15)
AST SERPL-CCNC: 21 U/L (ref 1–32)
B-HCG UR QL: NEGATIVE
BASOPHILS # BLD AUTO: 0.01 10*3/MM3 (ref 0–0.2)
BASOPHILS NFR BLD AUTO: 0.2 % (ref 0–1.5)
BILIRUB SERPL-MCNC: <0.2 MG/DL (ref 0–1.2)
BILIRUB UR QL STRIP: NEGATIVE
BUN SERPL-MCNC: 17 MG/DL (ref 6–20)
BUN/CREAT SERPL: 27.4 (ref 7–25)
CALCIUM SPEC-SCNC: 9.6 MG/DL (ref 8.6–10.5)
CHLORIDE SERPL-SCNC: 104 MMOL/L (ref 98–107)
CLARITY UR: CLEAR
CO2 SERPL-SCNC: 26 MMOL/L (ref 22–29)
COLOR UR: YELLOW
CREAT SERPL-MCNC: 0.62 MG/DL (ref 0.57–1)
D-DIMER, QUANTITATIVE (MAD,POW, STR): 1430 NG/ML (FEU) (ref 0–500)
DEPRECATED RDW RBC AUTO: 39.2 FL (ref 37–54)
EGFRCR SERPLBLD CKD-EPI 2021: 128.5 ML/MIN/1.73
EOSINOPHIL # BLD AUTO: 0.08 10*3/MM3 (ref 0–0.4)
EOSINOPHIL NFR BLD AUTO: 1.5 % (ref 0.3–6.2)
ERYTHROCYTE [DISTWIDTH] IN BLOOD BY AUTOMATED COUNT: 13.3 % (ref 12.3–15.4)
GLOBULIN UR ELPH-MCNC: 3.1 GM/DL
GLUCOSE SERPL-MCNC: 93 MG/DL (ref 65–99)
GLUCOSE UR STRIP-MCNC: NEGATIVE MG/DL
HCT VFR BLD AUTO: 36 % (ref 34–46.6)
HGB BLD-MCNC: 11.6 G/DL (ref 12–15.9)
HGB UR QL STRIP.AUTO: NEGATIVE
HOLD SPECIMEN: NORMAL
IMM GRANULOCYTES # BLD AUTO: 0.01 10*3/MM3 (ref 0–0.05)
IMM GRANULOCYTES NFR BLD AUTO: 0.2 % (ref 0–0.5)
KETONES UR QL STRIP: NEGATIVE
LEUKOCYTE ESTERASE UR QL STRIP.AUTO: NEGATIVE
LIPASE SERPL-CCNC: 43 U/L (ref 13–60)
LYMPHOCYTES # BLD AUTO: 2.81 10*3/MM3 (ref 0.7–3.1)
LYMPHOCYTES NFR BLD AUTO: 52.7 % (ref 19.6–45.3)
MAGNESIUM SERPL-MCNC: 1.9 MG/DL (ref 1.6–2.6)
MCH RBC QN AUTO: 26.2 PG (ref 26.6–33)
MCHC RBC AUTO-ENTMCNC: 32.2 G/DL (ref 31.5–35.7)
MCV RBC AUTO: 81.3 FL (ref 79–97)
MONOCYTES # BLD AUTO: 0.38 10*3/MM3 (ref 0.1–0.9)
MONOCYTES NFR BLD AUTO: 7.1 % (ref 5–12)
NEUTROPHILS NFR BLD AUTO: 2.04 10*3/MM3 (ref 1.7–7)
NEUTROPHILS NFR BLD AUTO: 38.3 % (ref 42.7–76)
NITRITE UR QL STRIP: NEGATIVE
NRBC BLD AUTO-RTO: 0 /100 WBC (ref 0–0.2)
NT-PROBNP SERPL-MCNC: <36 PG/ML (ref 0–450)
PH UR STRIP.AUTO: 7.5 [PH] (ref 5–9)
PLATELET # BLD AUTO: 279 10*3/MM3 (ref 140–450)
PMV BLD AUTO: 9.8 FL (ref 6–12)
POTASSIUM SERPL-SCNC: 4.1 MMOL/L (ref 3.5–5.2)
PROT SERPL-MCNC: 6.8 G/DL (ref 6–8.5)
PROT UR QL STRIP: NEGATIVE
QT INTERVAL: 352 MS
QTC INTERVAL: 428 MS
RBC # BLD AUTO: 4.43 10*6/MM3 (ref 3.77–5.28)
SODIUM SERPL-SCNC: 139 MMOL/L (ref 136–145)
SP GR UR STRIP: 1.03 (ref 1–1.03)
TROPONIN T SERPL HS-MCNC: <6 NG/L
UROBILINOGEN UR QL STRIP: NORMAL
WBC NRBC COR # BLD: 5.33 10*3/MM3 (ref 3.4–10.8)

## 2023-05-08 PROCEDURE — 81003 URINALYSIS AUTO W/O SCOPE: CPT | Performed by: EMERGENCY MEDICINE

## 2023-05-08 PROCEDURE — 93005 ELECTROCARDIOGRAM TRACING: CPT

## 2023-05-08 PROCEDURE — 71275 CT ANGIOGRAPHY CHEST: CPT

## 2023-05-08 PROCEDURE — 85025 COMPLETE CBC W/AUTO DIFF WBC: CPT | Performed by: EMERGENCY MEDICINE

## 2023-05-08 PROCEDURE — 93005 ELECTROCARDIOGRAM TRACING: CPT | Performed by: EMERGENCY MEDICINE

## 2023-05-08 PROCEDURE — 85379 FIBRIN DEGRADATION QUANT: CPT | Performed by: EMERGENCY MEDICINE

## 2023-05-08 PROCEDURE — 83735 ASSAY OF MAGNESIUM: CPT | Performed by: EMERGENCY MEDICINE

## 2023-05-08 PROCEDURE — 71045 X-RAY EXAM CHEST 1 VIEW: CPT

## 2023-05-08 PROCEDURE — 99284 EMERGENCY DEPT VISIT MOD MDM: CPT

## 2023-05-08 PROCEDURE — 80053 COMPREHEN METABOLIC PANEL: CPT | Performed by: EMERGENCY MEDICINE

## 2023-05-08 PROCEDURE — 83690 ASSAY OF LIPASE: CPT | Performed by: EMERGENCY MEDICINE

## 2023-05-08 PROCEDURE — 81025 URINE PREGNANCY TEST: CPT

## 2023-05-08 PROCEDURE — 25510000001 IOPAMIDOL PER 1 ML: Performed by: EMERGENCY MEDICINE

## 2023-05-08 PROCEDURE — 83880 ASSAY OF NATRIURETIC PEPTIDE: CPT | Performed by: EMERGENCY MEDICINE

## 2023-05-08 PROCEDURE — 96374 THER/PROPH/DIAG INJ IV PUSH: CPT

## 2023-05-08 PROCEDURE — 84484 ASSAY OF TROPONIN QUANT: CPT | Performed by: EMERGENCY MEDICINE

## 2023-05-08 RX ORDER — IBUPROFEN 800 MG/1
800 TABLET ORAL EVERY 6 HOURS PRN
COMMUNITY

## 2023-05-08 RX ORDER — SODIUM CHLORIDE 0.9 % (FLUSH) 0.9 %
10 SYRINGE (ML) INJECTION AS NEEDED
Status: DISCONTINUED | OUTPATIENT
Start: 2023-05-08 | End: 2023-05-09 | Stop reason: HOSPADM

## 2023-05-08 RX ORDER — LIDOCAINE HYDROCHLORIDE 20 MG/ML
15 SOLUTION OROPHARYNGEAL ONCE
Status: COMPLETED | OUTPATIENT
Start: 2023-05-08 | End: 2023-05-08

## 2023-05-08 RX ORDER — PANTOPRAZOLE SODIUM 40 MG/10ML
40 INJECTION, POWDER, LYOPHILIZED, FOR SOLUTION INTRAVENOUS ONCE
Status: COMPLETED | OUTPATIENT
Start: 2023-05-08 | End: 2023-05-08

## 2023-05-08 RX ORDER — ALUMINA, MAGNESIA, AND SIMETHICONE 2400; 2400; 240 MG/30ML; MG/30ML; MG/30ML
15 SUSPENSION ORAL ONCE
Status: COMPLETED | OUTPATIENT
Start: 2023-05-08 | End: 2023-05-08

## 2023-05-08 RX ADMIN — LIDOCAINE HYDROCHLORIDE 15 ML: 20 SOLUTION ORAL; TOPICAL at 19:46

## 2023-05-08 RX ADMIN — PANTOPRAZOLE SODIUM 40 MG: 40 INJECTION, POWDER, FOR SOLUTION INTRAVENOUS at 19:43

## 2023-05-08 RX ADMIN — ALUMINUM HYDROXIDE, MAGNESIUM HYDROXIDE, AND DIMETHICONE 15 ML: 400; 400; 40 SUSPENSION ORAL at 19:46

## 2023-05-08 RX ADMIN — IOPAMIDOL 60 ML: 755 INJECTION, SOLUTION INTRAVENOUS at 21:01

## 2023-05-08 NOTE — Clinical Note
[facility] EMERGENCY DEPARTMENT  [address] [place] [id number]  Phone: [phone]    Yuridia Irizarry was seen and treated in our emergency department on 5/8/2023.  She may return to work on 05/09/2023.         Thank you for choosing [facility].    Neo Moise, DO

## 2023-05-08 NOTE — Clinical Note
[facility] EMERGENCY DEPARTMENT  [address] [place] [id number]  Phone: [phone]    Yuridia Irizarry was seen and treated in our emergency department on 5/8/2023.  She may return to work on 05/09/2023.         Thank you for choosing [facility].    Neo Moise, DO

## 2023-05-08 NOTE — Clinical Note
[facility] EMERGENCY DEPARTMENT  [address] [place] [id number]  Phone: [phone]    Yuridia Irizarry was seen and treated in our emergency department on 5/8/2023.  She may return to work on 05/09/2023.         Thank you for choosing [facility].    Neo Moise, DO

## 2023-05-08 NOTE — Clinical Note
[facility] EMERGENCY DEPARTMENT  [address] [place] [phone]  Phone: [phone]    Raffy accompanied Yuridia Irizarry to the emergency department on 5/8/2023. They may return to work on 05/09/2023.        Thank you for choosing [facility].    Neo Moise, DO

## 2023-05-09 NOTE — ED PROVIDER NOTES
Subjective   History of Present Illness  22-year-old female presents to the emergency department with approximately 10 to 12 hours of continuous stabbing chest pain that radiates through to the back.  Denies associated shortness of breath, diaphoresis or nausea.  She reports that some ibuprofen made it better but then she ate and it worsened again.  Not worse with exertion.  Does not improve with rest.  Denies any significant family history of cardiac disease.  She does take birth control and smokes cigarettes.  No previous DVT or PE.  No previous cardiac evaluation.  History of asthma.    Family history, surgical history, social history, current medications and allergies are reviewed with the patient and triage documentation and vitals are reviewed.    History provided by:  Patient   used: No        Review of Systems   Constitutional: Negative for chills and fever.   HENT: Negative for congestion and sore throat.    Eyes: Negative for photophobia and visual disturbance.   Respiratory: Negative for cough, shortness of breath and wheezing.    Cardiovascular: Positive for chest pain. Negative for palpitations and leg swelling.   Gastrointestinal: Negative for abdominal pain, diarrhea, nausea and vomiting.   Endocrine: Negative for polydipsia, polyphagia and polyuria.   Genitourinary: Negative for dysuria, frequency and urgency.   Musculoskeletal: Negative for arthralgias, back pain, myalgias and neck pain.   Skin: Negative for rash and wound.   Allergic/Immunologic: Negative.    Neurological: Negative for weakness, light-headedness and numbness.   Hematological: Negative.    Psychiatric/Behavioral: Negative.        Past Medical History:   Diagnosis Date   • Anemia during pregnancy in third trimester 9/19/2022   • Asthma    • Injury of back    • Migraine        Allergies   Allergen Reactions   • Rocephin [Ceftriaxone] Rash       Past Surgical History:   Procedure Laterality Date   • TONSILLECTOMY          Family History   Problem Relation Age of Onset   • Seizures Father    • Diabetes Mother    • Arthritis Mother    • No Known Problems Sister    • Breast cancer Paternal Grandmother    • Lung disease Paternal Grandfather    • Asthma Sister        Social History     Socioeconomic History   • Marital status:    • Number of children: 1   • Highest education level: High school graduate   Tobacco Use   • Smoking status: Former     Types: Cigarettes   • Smokeless tobacco: Never   • Tobacco comments:     Smokes e-cig   Vaping Use   • Vaping Use: Every day   • Substances: Nicotine, Flavoring, trying to quit   • Devices: Disposable   Substance and Sexual Activity   • Alcohol use: Not Currently   • Drug use: Yes     Types: Marijuana     Comment: occasionally for pain   • Sexual activity: Yes     Partners: Male     Birth control/protection: Birth control pill           Objective   Physical Exam  Vitals and nursing note reviewed.   Constitutional:       General: She is not in acute distress.     Appearance: She is well-developed and overweight. She is not ill-appearing, toxic-appearing or diaphoretic.   HENT:      Head: Normocephalic.   Eyes:      Pupils: Pupils are equal, round, and reactive to light.   Neck:      Vascular: No JVD.   Cardiovascular:      Rate and Rhythm: Normal rate and regular rhythm.  No extrasystoles are present.     Pulses:           Radial pulses are 2+ on the right side and 2+ on the left side.        Dorsalis pedis pulses are 2+ on the right side and 2+ on the left side.      Heart sounds: Normal heart sounds. No murmur heard.  Pulmonary:      Effort: Pulmonary effort is normal. No tachypnea, accessory muscle usage or respiratory distress.      Breath sounds: No decreased breath sounds, wheezing, rhonchi or rales.   Chest:      Chest wall: No tenderness or crepitus.   Abdominal:      General: Bowel sounds are normal.      Palpations: Abdomen is soft. There is no hepatomegaly or splenomegaly.       Tenderness: There is no abdominal tenderness. There is no guarding or rebound.   Musculoskeletal:         General: Normal range of motion.      Cervical back: Neck supple.      Right lower leg: No tenderness. No edema.      Left lower leg: No tenderness. No edema.   Skin:     General: Skin is warm and dry.      Capillary Refill: Capillary refill takes less than 2 seconds.   Neurological:      General: No focal deficit present.      Mental Status: She is alert and oriented to person, place, and time.   Psychiatric:         Mood and Affect: Mood normal.         Behavior: Behavior normal.         ECG 12 Lead      Date/Time: 5/8/2023 3:54 PM  Performed by: Neo Moise DO  Authorized by: Neo Moise DO   Interpreted by physician  Comments: EKG May 8, 2023 at 1554 reveals normal sinus rhythm at a rate of 89 bpm.  T wave flattening in lead III without inversion.  No ST elevation or depression.  No evidence of acute ischemia.  QTc 428      PERC Rule for Pulmonary Embolism - MDCalc  Calculated on May 08 2023 8:19 PM  1 criteria -> If any criteria are positive, the PERC rule cannot be used to rule out PE in this patient.    Wells' Criteria for Pulmonary Embolism - MDCalc  Calculated on May 08 2023 8:19 PM  0.0 points -> Low risk group: 1.3% chance of PE in an ED population. Another study assigned scores ? 4 as “PE Unlikely” and had a 3% incidence of PE.     HEART Score for Major Cardiac Events - MDCalc  Calculated on May 08 2023 8:18 PM  1 points -> Low Score (0-3 points) Risk of MACE of 0.9-1.7%.    ED Course      Labs Reviewed   COMPREHENSIVE METABOLIC PANEL - Abnormal; Notable for the following components:       Result Value    BUN/Creatinine Ratio 27.4 (*)     All other components within normal limits    Narrative:     GFR Normal >60  Chronic Kidney Disease <60  Kidney Failure <15     D-DIMER, QUANTITATIVE - Abnormal; Notable for the following components:    D-Dimer, Quantitative 1,430 (*)     All  "other components within normal limits    Narrative:     According to the assay 's published package insert, a normal (<500 ng/mL (FEU)) D-dimer result in conjunction with a non-high clinical probability assessment, excludes deep vein thrombosis (DVT) and pulmonary embolism (PE) with high sensitivity.    D-dimer values increase with age and this can make VTE exclusion of an older population difficult. To address this, the American College of [employer], based on best available evidence and recent guidelines, recommends that clinicians use age-adjusted D-dimer thresholds in patients greater than 50 years of age with: a) a low probability of PE who do not meet all Pulmonary Embolism Rule Out Criteria, or b) in those with intermediate probability of PE.   The formula for an age-adjusted D-dimer cut-off is \"age*10\".  For example, a 60 year old patient would have an age-adjusted cut-off of 600 ng/mL (FEU) and an 80 year old 800 ng/mL (FEU).     CBC WITH AUTO DIFFERENTIAL - Abnormal; Notable for the following components:    Hemoglobin 11.6 (*)     MCH 26.2 (*)     Neutrophil % 38.3 (*)     Lymphocyte % 52.7 (*)     All other components within normal limits   URINALYSIS W/ MICROSCOPIC IF INDICATED (NO CULTURE) - Normal    Narrative:     Urine microscopic not indicated.   PREGNANCY, URINE - Normal   BNP (IN-HOUSE) - Normal    Narrative:     Among patients with dyspnea, NT-proBNP is highly sensitive for the detection of acute congestive heart failure. In addition NT-proBNP of <300 pg/ml effectively rules out acute congestive heart failure with 99% negative predictive value.     SINGLE HSTROPONIN T - Normal    Narrative:     High Sensitive Troponin T Reference Range:  <10.0 ng/L- Negative Female for AMI  <15.0 ng/L- Negative Male for AMI  >=10 - Abnormal Female indicating possible myocardial injury.  >=15 - Abnormal Male indicating possible myocardial injury.   Clinicians would have to utilize clinical acumen, EKG, " Troponin, and serial changes to determine if it is an Acute Myocardial Infarction or myocardial injury due to an underlying chronic condition.        MAGNESIUM - Normal   LIPASE - Normal   CBC AND DIFFERENTIAL    Narrative:     The following orders were created for panel order CBC & Differential.  Procedure                               Abnormality         Status                     ---------                               -----------         ------                     CBC Auto Differential[[id number]]        Abnormal            Final result                 Please view results for these tests on the individual orders.   EXTRA TUBES    Narrative:     The following orders were created for panel order Extra Tubes.  Procedure                               Abnormality         Status                     ---------                               -----------         ------                     Gold Top - SST[[id number]]                                   Final result                 Please view results for these tests on the individual orders.   GOLD TOP - SST     XR Chest 1 View    Result Date: 5/8/2023  Narrative: Comparison: None FINDINGS: No focal consolidation, pleural effusion, or pneumothorax.  Cardiomediastinal silhouette is unremarkable.     CT Angiogram Chest    Result Date: 5/8/2023  Narrative: CTA CHEST W CONTRAST HISTORY: chest pain, tachy, elevated d-dimer COMPARISON: None. Automated exposure control was also utilized to decrease patient radiation dose. TECHNIQUE: Helical tomographic images of the chest were obtained after the administration of intravenous contrast following angiogram protocol. Additionally, 3D MIP reconstructions in the coronal and sagittal planes were provided. FINDINGS: AORTA: Normal in caliber without evidence of aneurysm, dissection or significant stenosis. Mild atherosclerotic disease. BRACHIOCEPHALIC VESSELS: Normal branch anatomy. The visualized portions of the great vessels are patent  without significant abnormality. OTHER FINDINGS: The imaged portion of the base of the neck appears unremarkable. The lungs are clear without pleural effusion, consolidation, nodule, or mass lesion. The trachea and bronchial tree are patent. The heart is normal in size. There is no pericardial effusion. There are no filling defects within the main, lobar, segmental or visualized subsegmental pulmonary arteries. No enlarged axillary or mediastinal lymph nodes are present. The osseous structures of the thorax and surrounding soft tissues demonstrate no acute process. Cholelithiasis is seen.     Impression: 1. Normal CT angiogram of the chest. 2. Incidentally noted cholelithiasis.         Medical Decision Making  Amount and/or Complexity of Data Reviewed  Labs: ordered.  Radiology: ordered.  ECG/medicine tests: ordered and independent interpretation performed.      Risk  OTC drugs.  Prescription drug management.      2045 cardiac enzymes unremarkable.  Lipase and magnesium normal.  No white blood cell count elevation.  Hemoglobin 11.6.  Kidney and liver function unremarkable.  Patient does have a D-dimer elevation of 1430.  CT angiogram is ordered.  Patient reports minimal change after GI cocktail.  Agreeable to CTA imaging.    2150 patient CTA reveals no evidence of pulmonary embolism.  There is no other acute abnormality in the chest to explain symptoms.  She does incidentally have evidence of cholelithiasis which could be consistent with her epigastric lower chest discomfort and reflux type symptoms.  No secondary signs of cholecystitis.  Laboratory studies overall otherwise unremarkable.  Patient is advised on symptomatic treatment and outpatient follow-up with general surgery as well as primary care.  She acknowledges understanding and is agreeable to plan and discharge at this time.  Discussed reasons to return to the emergency department.    Final diagnoses:   Chest pain, unspecified type   Calculus of  gallbladder without cholecystitis without obstruction       ED Disposition  ED Disposition     ED Disposition   Discharge    Condition   Stable    Comment   --             Krzysztof Broussard MD  [address] [place] [address] DR  [facility] [address] [place] [id number] [phone]          [facility]  [address]   [facility] [id number] [phone]  Schedule an appointment as soon as possible for a visit            Medication List      Stop    loperamide 2 MG tablet  Commonly known as: IMODIUM A-D     ondansetron ODT 4 MG disintegrating tablet  Commonly known as: ZOFRAN-ODT             Neo Moise,   05/08/23 2208

## 2023-05-10 ENCOUNTER — OFFICE VISIT (OUTPATIENT)
Dept: SURGERY | Facility: CLINIC | Age: 23
End: 2023-05-10
Payer: COMMERCIAL

## 2023-05-10 ENCOUNTER — HOSPITAL ENCOUNTER (OUTPATIENT)
Dept: ULTRASOUND IMAGING | Facility: HOSPITAL | Age: 23
Discharge: HOME | End: 2023-05-10
Admitting: SURGERY
Payer: COMMERCIAL

## 2023-05-10 VITALS
HEIGHT: 61 IN | BODY MASS INDEX: 34.93 KG/M2 | TEMPERATURE: 96.8 F | WEIGHT: 185 LBS | DIASTOLIC BLOOD PRESSURE: 66 MMHG | HEART RATE: 86 BPM | SYSTOLIC BLOOD PRESSURE: 104 MMHG

## 2023-05-10 DIAGNOSIS — K81.9 CHOLECYSTITIS: ICD-10-CM

## 2023-05-10 DIAGNOSIS — K80.10 CALCULUS OF GALLBLADDER WITH CHRONIC CHOLECYSTITIS WITHOUT OBSTRUCTION: ICD-10-CM

## 2023-05-10 DIAGNOSIS — R10.13 EPIGASTRIC ABDOMINAL PAIN: ICD-10-CM

## 2023-05-10 DIAGNOSIS — R10.13 EPIGASTRIC ABDOMINAL PAIN: Primary | ICD-10-CM

## 2023-05-10 PROCEDURE — 76705 ECHO EXAM OF ABDOMEN: CPT

## 2023-05-10 PROCEDURE — 99204 OFFICE O/P NEW MOD 45 MIN: CPT | Performed by: SURGERY

## 2023-05-10 RX ORDER — SODIUM CHLORIDE 9 MG/ML
40 INJECTION, SOLUTION INTRAVENOUS AS NEEDED
Status: CANCELLED | OUTPATIENT
Start: 2023-05-11

## 2023-05-10 RX ORDER — INDOCYANINE GREEN AND WATER 25 MG
5 KIT INJECTION ONCE
Status: CANCELLED | OUTPATIENT
Start: 2023-05-11 | End: 2023-05-10

## 2023-05-10 RX ORDER — LEVOFLOXACIN 5 MG/ML
500 INJECTION, SOLUTION INTRAVENOUS ONCE
Status: CANCELLED | OUTPATIENT
Start: 2023-05-11 | End: 2023-05-10

## 2023-05-10 RX ORDER — SODIUM CHLORIDE 0.9 % (FLUSH) 0.9 %
10 SYRINGE (ML) INJECTION AS NEEDED
Status: CANCELLED | OUTPATIENT
Start: 2023-05-11

## 2023-05-10 RX ORDER — SODIUM CHLORIDE 0.9 % (FLUSH) 0.9 %
10 SYRINGE (ML) INJECTION EVERY 12 HOURS SCHEDULED
Status: CANCELLED | OUTPATIENT
Start: 2023-05-11

## 2023-05-10 NOTE — H&P (VIEW-ONLY)
Subjective   Yuridia Irizarry is a 22 y.o. female     Chief Complaint: Traumatic gallstones    History of Present Illness presents after having 2 episodes of epigastric pain that radiated through to her back.  She presented to the emergency room with a second episode and work-up demonstrated noncardiac issues CT scan of the chest demonstrated gallstones.  She had eaten something before these episodes.  No history of pancreatitis no history of jaundice.  No prior history of any similar pain no prior history of upper abdominal pain.  Strong family history of gallbladder disease.  No prior abdominal surgery.  Reviewed CT scan with patient clearly does have gallstones but limited study as it did not identify the entire gallbladder or liver.  LFTs are within normal limits.  Lipase was within normal limits    Review of Systems   Constitutional: Negative.    HENT: Negative.    Eyes: Negative.    Respiratory: Negative.    Cardiovascular: Negative.    Gastrointestinal: Positive for abdominal pain.        Intermittent Epigastric pain   Endocrine: Negative.    Genitourinary: Negative.    Musculoskeletal: Positive for back pain.   Skin: Negative.    Allergic/Immunologic: Negative.    Neurological: Negative.    Hematological: Negative.    Psychiatric/Behavioral: Negative.      Past Medical History:   Diagnosis Date   • Anemia during pregnancy in third trimester 9/19/2022   • Asthma    • Injury of back    • Migraine      Past Surgical History:   Procedure Laterality Date   • TONSILLECTOMY       Family History   Problem Relation Age of Onset   • Seizures Father    • Diabetes Mother    • Arthritis Mother    • No Known Problems Sister    • Breast cancer Paternal Grandmother    • Lung disease Paternal Grandfather    • Asthma Sister      Social History     Socioeconomic History   • Marital status:    • Number of children: 1   • Highest education level: High school graduate   Tobacco Use   • Smoking status: Former      Types: Cigarettes   • Smokeless tobacco: Never   • Tobacco comments:     Smokes e-cig   Vaping Use   • Vaping Use: Every day   • Substances: Nicotine, Flavoring, trying to quit   • Devices: Disposable   Substance and Sexual Activity   • Alcohol use: Not Currently   • Drug use: Yes     Types: Marijuana     Comment: occasionally for pain   • Sexual activity: Yes     Partners: Male     Birth control/protection: Birth control pill     Allergies   Allergen Reactions   • Rocephin [Ceftriaxone] Rash     Vitals:    05/10/23 0939   BP: 104/66   Pulse: 86   Temp: 96.8 °F (36 °C)       Home Medications:  Prior to Admission medications    Medication Sig Start Date End Date Taking? Authorizing Provider   acetaminophen (TYLENOL) 500 MG tablet Take 2 tablets by mouth Every 8 (Eight) Hours As Needed for Moderate Pain. 12/5/22  Yes Emanuel Rosas MD   ibuprofen (ADVIL,MOTRIN) 800 MG tablet Take 1 tablet by mouth Every 6 (Six) Hours As Needed for Mild Pain.   Yes Provider, MD Jeferson   norgestimate-ethinyl estradiol (Sprintec 28) 0.25-35 MG-MCG per tablet Take 1 tablet by mouth Daily. 1/12/23  Yes Aida Ames APRN   norethindrone-ethinyl estradiol FE (Junel FE 1/20) 1-20 MG-MCG per tablet Take 1 tablet by mouth Daily. 9/7/21 3/28/22  Dominique Duenas MD       Objective   Physical Exam  Constitutional:       General: She is not in acute distress.     Appearance: She is well-developed.   HENT:      Head: Normocephalic and atraumatic.   Eyes:      General: No scleral icterus.     Conjunctiva/sclera: Conjunctivae normal.   Neck:      Thyroid: No thyromegaly.      Trachea: No tracheal deviation.   Cardiovascular:      Rate and Rhythm: Normal rate and regular rhythm.      Heart sounds: Normal heart sounds. No murmur heard.    No friction rub. No gallop.   Pulmonary:      Effort: Pulmonary effort is normal. No respiratory distress.      Breath sounds: Normal breath sounds. No stridor. No wheezing or rales.   Chest:       Chest wall: No tenderness.   Abdominal:      General: Bowel sounds are normal. There is no distension.      Palpations: Abdomen is soft. There is no mass.      Tenderness: There is no abdominal tenderness. There is no guarding or rebound.      Hernia: No hernia is present.   Musculoskeletal:         General: No deformity. Normal range of motion.      Cervical back: Normal range of motion and neck supple.   Lymphadenopathy:      Cervical: No cervical adenopathy.   Skin:     General: Skin is warm and dry.      Coloration: Skin is not pale.      Findings: No erythema or rash.      Nails: There is no clubbing.   Neurological:      Mental Status: She is alert and oriented to person, place, and time.   Psychiatric:         Behavior: Behavior normal.         Thought Content: Thought content normal.         Assessment & Plan Symptomatic cholelithiasis and or chronic cholecystitis.  Fully discussed the pathophysiology with the patient.  Would recommend robotic cholecystectomy.  Fully discussed the procedure alternatives risk benefits with the patient she clearly understands and wishes to proceed.  We will obtain an ultrasound of the gallbladder today and we will plan on surgery tomorrow      There were no encounter diagnoses.                     This document has been electronically signed by Anahi Collado CSA on May 10, 2023 09:42 CDT

## 2023-05-11 ENCOUNTER — HOSPITAL ENCOUNTER (OUTPATIENT)
Facility: HOSPITAL | Age: 23
Setting detail: HOSPITAL OUTPATIENT SURGERY
Discharge: HOME | End: 2023-05-11
Attending: SURGERY | Admitting: SURGERY
Payer: COMMERCIAL

## 2023-05-11 ENCOUNTER — ANESTHESIA (OUTPATIENT)
Dept: PERIOP | Facility: HOSPITAL | Age: 23
End: 2023-05-11
Payer: COMMERCIAL

## 2023-05-11 ENCOUNTER — ANESTHESIA EVENT (OUTPATIENT)
Dept: PERIOP | Facility: HOSPITAL | Age: 23
End: 2023-05-11
Payer: COMMERCIAL

## 2023-05-11 VITALS
RESPIRATION RATE: 18 BRPM | HEIGHT: 61 IN | BODY MASS INDEX: 34.92 KG/M2 | OXYGEN SATURATION: 100 % | HEART RATE: 69 BPM | TEMPERATURE: 97 F | DIASTOLIC BLOOD PRESSURE: 60 MMHG | SYSTOLIC BLOOD PRESSURE: 105 MMHG | WEIGHT: 184.97 LBS

## 2023-05-11 DIAGNOSIS — K81.9 CHOLECYSTITIS: ICD-10-CM

## 2023-05-11 DIAGNOSIS — R10.13 EPIGASTRIC ABDOMINAL PAIN: ICD-10-CM

## 2023-05-11 DIAGNOSIS — K80.10 CALCULUS OF GALLBLADDER WITH CHRONIC CHOLECYSTITIS WITHOUT OBSTRUCTION: ICD-10-CM

## 2023-05-11 LAB — B-HCG UR QL: NEGATIVE

## 2023-05-11 PROCEDURE — 25010000002 SUGAMMADEX 200 MG/2ML SOLUTION: Performed by: NURSE ANESTHETIST, CERTIFIED REGISTERED

## 2023-05-11 PROCEDURE — 0 MEPERIDINE PER 100 MG: Performed by: NURSE ANESTHETIST, CERTIFIED REGISTERED

## 2023-05-11 PROCEDURE — 25010000002 DEXAMETHASONE PER 1 MG: Performed by: NURSE ANESTHETIST, CERTIFIED REGISTERED

## 2023-05-11 PROCEDURE — 25010000002 LEVOFLOXACIN PER 250 MG: Performed by: SURGERY

## 2023-05-11 PROCEDURE — 25010000002 FENTANYL CITRATE (PF) 100 MCG/2ML SOLUTION: Performed by: NURSE ANESTHETIST, CERTIFIED REGISTERED

## 2023-05-11 PROCEDURE — 25010000002 MIDAZOLAM PER 1 MG: Performed by: NURSE ANESTHETIST, CERTIFIED REGISTERED

## 2023-05-11 PROCEDURE — 25010000002 INDOCYANINE GREEN 25 MG RECONSTITUTED SOLUTION: Performed by: SURGERY

## 2023-05-11 PROCEDURE — 25010000002 ONDANSETRON PER 1 MG: Performed by: NURSE ANESTHETIST, CERTIFIED REGISTERED

## 2023-05-11 PROCEDURE — 81025 URINE PREGNANCY TEST: CPT | Performed by: ANESTHESIOLOGY

## 2023-05-11 PROCEDURE — 25010000002 PROPOFOL 10 MG/ML EMULSION: Performed by: NURSE ANESTHETIST, CERTIFIED REGISTERED

## 2023-05-11 PROCEDURE — 47562 LAPAROSCOPIC CHOLECYSTECTOMY: CPT | Performed by: SPECIALIST/TECHNOLOGIST, OTHER

## 2023-05-11 PROCEDURE — 47562 LAPAROSCOPIC CHOLECYSTECTOMY: CPT | Performed by: SURGERY

## 2023-05-11 RX ORDER — INDOCYANINE GREEN AND WATER 25 MG
5 KIT INJECTION ONCE
Status: COMPLETED | OUTPATIENT
Start: 2023-05-11 | End: 2023-05-11

## 2023-05-11 RX ORDER — SODIUM CHLORIDE 0.9 % (FLUSH) 0.9 %
10 SYRINGE (ML) INJECTION AS NEEDED
Status: DISCONTINUED | OUTPATIENT
Start: 2023-05-11 | End: 2023-05-11 | Stop reason: HOSPADM

## 2023-05-11 RX ORDER — MEPERIDINE HYDROCHLORIDE 50 MG/ML
12.5 INJECTION INTRAMUSCULAR; INTRAVENOUS; SUBCUTANEOUS
Status: COMPLETED | OUTPATIENT
Start: 2023-05-11 | End: 2023-05-11

## 2023-05-11 RX ORDER — LEVOFLOXACIN 5 MG/ML
500 INJECTION, SOLUTION INTRAVENOUS ONCE
Status: COMPLETED | OUTPATIENT
Start: 2023-05-11 | End: 2023-05-11

## 2023-05-11 RX ORDER — SODIUM CHLORIDE 9 MG/ML
40 INJECTION, SOLUTION INTRAVENOUS AS NEEDED
Status: DISCONTINUED | OUTPATIENT
Start: 2023-05-11 | End: 2023-05-11 | Stop reason: HOSPADM

## 2023-05-11 RX ORDER — ROCURONIUM BROMIDE 10 MG/ML
INJECTION, SOLUTION INTRAVENOUS AS NEEDED
Status: DISCONTINUED | OUTPATIENT
Start: 2023-05-11 | End: 2023-05-11 | Stop reason: SURG

## 2023-05-11 RX ORDER — SODIUM CHLORIDE 0.9 % (FLUSH) 0.9 %
10 SYRINGE (ML) INJECTION EVERY 12 HOURS SCHEDULED
Status: DISCONTINUED | OUTPATIENT
Start: 2023-05-11 | End: 2023-05-11 | Stop reason: HOSPADM

## 2023-05-11 RX ORDER — SODIUM CHLORIDE, SODIUM GLUCONATE, SODIUM ACETATE, POTASSIUM CHLORIDE AND MAGNESIUM CHLORIDE 526; 502; 368; 37; 30 MG/100ML; MG/100ML; MG/100ML; MG/100ML; MG/100ML
INJECTION, SOLUTION INTRAVENOUS CONTINUOUS PRN
Status: DISCONTINUED | OUTPATIENT
Start: 2023-05-11 | End: 2023-05-11

## 2023-05-11 RX ORDER — DEXAMETHASONE SODIUM PHOSPHATE 4 MG/ML
INJECTION, SOLUTION INTRA-ARTICULAR; INTRALESIONAL; INTRAMUSCULAR; INTRAVENOUS; SOFT TISSUE AS NEEDED
Status: DISCONTINUED | OUTPATIENT
Start: 2023-05-11 | End: 2023-05-11 | Stop reason: SURG

## 2023-05-11 RX ORDER — HYDROCODONE BITARTRATE AND ACETAMINOPHEN 5; 325 MG/1; MG/1
1 TABLET ORAL EVERY 6 HOURS PRN
Qty: 12 TABLET | Refills: 0 | Status: SHIPPED | OUTPATIENT
Start: 2023-05-11 | End: 2023-05-17

## 2023-05-11 RX ORDER — PROPOFOL 10 MG/ML
VIAL (ML) INTRAVENOUS AS NEEDED
Status: DISCONTINUED | OUTPATIENT
Start: 2023-05-11 | End: 2023-05-11 | Stop reason: SURG

## 2023-05-11 RX ORDER — MIDAZOLAM HYDROCHLORIDE 1 MG/ML
INJECTION INTRAMUSCULAR; INTRAVENOUS AS NEEDED
Status: DISCONTINUED | OUTPATIENT
Start: 2023-05-11 | End: 2023-05-11 | Stop reason: SURG

## 2023-05-11 RX ORDER — FENTANYL CITRATE 50 UG/ML
INJECTION, SOLUTION INTRAMUSCULAR; INTRAVENOUS AS NEEDED
Status: DISCONTINUED | OUTPATIENT
Start: 2023-05-11 | End: 2023-05-11 | Stop reason: SURG

## 2023-05-11 RX ORDER — SODIUM CHLORIDE, SODIUM GLUCONATE, SODIUM ACETATE, POTASSIUM CHLORIDE AND MAGNESIUM CHLORIDE 526; 502; 368; 37; 30 MG/100ML; MG/100ML; MG/100ML; MG/100ML; MG/100ML
1000 INJECTION, SOLUTION INTRAVENOUS CONTINUOUS PRN
Status: DISCONTINUED | OUTPATIENT
Start: 2023-05-11 | End: 2023-05-11 | Stop reason: HOSPADM

## 2023-05-11 RX ORDER — LIDOCAINE HYDROCHLORIDE 20 MG/ML
INJECTION, SOLUTION EPIDURAL; INFILTRATION; INTRACAUDAL; PERINEURAL AS NEEDED
Status: DISCONTINUED | OUTPATIENT
Start: 2023-05-11 | End: 2023-05-11 | Stop reason: SURG

## 2023-05-11 RX ORDER — BUPIVACAINE HYDROCHLORIDE AND EPINEPHRINE 2.5; 5 MG/ML; UG/ML
INJECTION, SOLUTION EPIDURAL; INFILTRATION; INTRACAUDAL; PERINEURAL AS NEEDED
Status: DISCONTINUED | OUTPATIENT
Start: 2023-05-11 | End: 2023-05-11 | Stop reason: HOSPADM

## 2023-05-11 RX ORDER — ONDANSETRON 2 MG/ML
4 INJECTION INTRAMUSCULAR; INTRAVENOUS ONCE AS NEEDED
Status: DISCONTINUED | OUTPATIENT
Start: 2023-05-11 | End: 2023-05-11 | Stop reason: HOSPADM

## 2023-05-11 RX ORDER — ONDANSETRON 2 MG/ML
INJECTION INTRAMUSCULAR; INTRAVENOUS AS NEEDED
Status: DISCONTINUED | OUTPATIENT
Start: 2023-05-11 | End: 2023-05-11 | Stop reason: SURG

## 2023-05-11 RX ADMIN — PROPOFOL 180 MG: 10 INJECTION, EMULSION INTRAVENOUS at 11:18

## 2023-05-11 RX ADMIN — FENTANYL CITRATE 25 MCG: 50 INJECTION, SOLUTION INTRAMUSCULAR; INTRAVENOUS at 11:45

## 2023-05-11 RX ADMIN — MIDAZOLAM HYDROCHLORIDE 2 MG: 1 INJECTION, SOLUTION INTRAMUSCULAR; INTRAVENOUS at 11:08

## 2023-05-11 RX ADMIN — ROCURONIUM BROMIDE 50 MG: 10 INJECTION INTRAVENOUS at 11:18

## 2023-05-11 RX ADMIN — DEXAMETHASONE SODIUM PHOSPHATE 4 MG: 4 INJECTION, SOLUTION INTRAMUSCULAR; INTRAVENOUS at 11:20

## 2023-05-11 RX ADMIN — SODIUM CHLORIDE, SODIUM GLUCONATE, SODIUM ACETATE, POTASSIUM CHLORIDE AND MAGNESIUM CHLORIDE: 526; 502; 368; 37; 30 INJECTION, SOLUTION INTRAVENOUS at 12:52

## 2023-05-11 RX ADMIN — SUGAMMADEX 150 MG: 100 INJECTION, SOLUTION INTRAVENOUS at 13:04

## 2023-05-11 RX ADMIN — FENTANYL CITRATE 75 MCG: 50 INJECTION, SOLUTION INTRAMUSCULAR; INTRAVENOUS at 11:16

## 2023-05-11 RX ADMIN — LEVOFLOXACIN 500 MG: 5 INJECTION, SOLUTION INTRAVENOUS at 11:20

## 2023-05-11 RX ADMIN — MEPERIDINE HYDROCHLORIDE 12.5 MG: 50 INJECTION INTRAMUSCULAR; INTRAVENOUS; SUBCUTANEOUS at 13:26

## 2023-05-11 RX ADMIN — ONDANSETRON 4 MG: 2 INJECTION INTRAMUSCULAR; INTRAVENOUS at 11:20

## 2023-05-11 RX ADMIN — LIDOCAINE HYDROCHLORIDE 80 MG: 20 INJECTION, SOLUTION EPIDURAL; INFILTRATION; INTRACAUDAL; PERINEURAL at 11:18

## 2023-05-11 RX ADMIN — ROCURONIUM BROMIDE 10 MG: 10 INJECTION INTRAVENOUS at 12:07

## 2023-05-11 RX ADMIN — MEPERIDINE HYDROCHLORIDE 12.5 MG: 50 INJECTION INTRAMUSCULAR; INTRAVENOUS; SUBCUTANEOUS at 13:21

## 2023-05-11 RX ADMIN — SODIUM CHLORIDE, SODIUM GLUCONATE, SODIUM ACETATE, POTASSIUM CHLORIDE AND MAGNESIUM CHLORIDE 1000 ML: 526; 502; 368; 37; 30 INJECTION, SOLUTION INTRAVENOUS at 08:32

## 2023-05-11 RX ADMIN — INDOCYANINE GREEN AND WATER 5 MG: KIT at 08:34

## 2023-05-11 NOTE — OP NOTE
CHOLECYSTECTOMY LAPAROSCOPIC WITH DAVINCI ROBOT  Procedure Note    Yuridia Irizarry  5/11/2023    Pre-op Diagnosis:   Epigastric abdominal pain [R10.13]  Calculus of gallbladder with chronic cholecystitis without obstruction [K80.10]  Cholecystitis [K81.9]    Post-op Diagnosis:     Post-Op Diagnosis Codes:     * Epigastric abdominal pain [R10.13]     * Calculus of gallbladder with chronic cholecystitis without obstruction [K80.10]     * Cholecystitis [K81.9]    Procedure/CPT® Codes:      Procedure(s):  CHOLECYSTECTOMY LAPAROSCOPIC WITH DAVINCI ROBOT    Surgeon(s):  Eugene Rouse MD    Anesthesia: General    Staff:   Circulator: Raj Sampson RN; Liliana Dobson RN  Scrub Person: Janae Collins  Assistant: Anahi Collado CSA    Assistant: Anahi Collado CSA was responsible for performing the following activities: Retraction, Suction, Irrigation, Suturing, Closing and Placing Dressing and their skilled assistance was necessary for the success of this case.     Estimated Blood Loss: minimal    Specimens:                ID Type Source Tests Collected by Time   A : gallbladder and contents Tissue Gallbladder TISSUE EXAM, [facility]) Eugene Rouse MD 5/11/2023 1201         Drains: * No LDAs found *    Indications: 22-year-old female presents with right upper quadrant and chest pain.  Presented to the emergency room and ruled out for any cardiac issues.  No PE on CT scan.  Was noted to have gallstones.  Ultrasound confirmed gallstones with a normal size common bile duct.  Patient also had normal LFTs.  She presents now for robotic cholecystectomy    Findings: Multiple various sized stones present within the gallbladder.  Minimal chronic inflammatory changes appreciated.  Long cystic duct with multiple stones milked out of the cystic duct.  Anatomy confirmed with ICG firefly.    Complications: None    Procedure: The patient was brought to the operating room.  She was placed under general  endotracheal anesthesia without difficulty.  She had SCD's in place.  She had been injected with ICG approximately 2.5 hours prior to the procedure.  The abdomen was prepped and draped in normal sterile fashion.  Appropriate timeout was taken; everyone was in agreement.  We accessed the left upper quadrant with a 5 mm VisiPort without any real difficulty.  After an adequate pneumoperitoneum was obtained we did a TAP abdominal wall block on the right side of the abdomen with a total of 20 mL of 0.5% Marcaine.  This was injected using the laparoscope for guidance.  We then placed an 8 mm trocar in the right lower quadrant, this was trocar number 1.  Trocar number 2 was placed just to the right of midline just inferior to the umbilicus and this too, again, was a robotic trocar.  We put the camera in trocar number 1 and looked back, there was no evidence of any injury as we accessed the abdomen.  We did a TAP abdominal wall block on that side with a total of 10 mL of 0.5% Marcaine.  We placed another 8 mm trocar to the left of midline above the umbilicus.  This was trocar number 3.  We replaced the 5 mm VisiPort with another robotic trocar and this was trocar number 4.  We then positioned the patient laparoscopically and then targeted and docked the robot without any real difficulty.  The grasper was placed through trocar number 4, hook was placed in trocar number 3, camera in trocar number 2, and then the fenestrated grasper in trocar number 1.  We grasped the gallbladder and retracted it anteriorly and superiorly.  There were some adhesions up to the body of the gallbladder which we took down using the hook without any real difficulty.  This exposed the neck of the gallbladder.  We opened the peritoneum and the anterior and posterior aspects of Calot's triangle.  The patient had a long cystic duct.  We confirmed that with the Firefly view.  We opened the peritoneum and dissected completely around the cystic duct.  We  milked stones out of the cystic duct back up into the gallbladder.  We were able to get a good critical view of safety.  Firefly view confirmed this.  Cystic artery was medialized.  At that point we placed 2 clips proximally and 1 clip distally in the cystic duct, and 1 clip proximally on the cystic artery.  We then divided the cystic duct with the scissors and the cystic artery with cautery and with the scissors through trocar number 3.  We then dissected the gallbladder out of the bed of the liver without getting into the gallbladder with the scissors.  Freed the gallbladder up completely.  We checked our clips, they were all in good position.  Good hemostasis was noted.  No significant bile leak was appreciated.  The gallbladder was then placed into an Endo Catch bag, it was placed in trocar number 3, after the scissors were removed.  The gallbladder was then removed through trocar number 3.  Instruments were removed.  Trocars were removed.  The skin was closed at all sites with 4-0 Monocryl subcuticular stitch.  Glue was used for final skin closure.  The patient was awakened, extubated, and transferred to the recovery room in awake and stable condition.                  Disposition: Transfer to recovery in stable condition        Eugene Rouse MD     Date: 5/11/2023  Time: 13:09 CDT

## 2023-05-11 NOTE — INTERVAL H&P NOTE
H&P reviewed. The patient was examined and there are no changes to the H&P.      Temp:  [96.8 °F (36 °C)-97.1 °F (36.2 °C)] 97.1 °F (36.2 °C)  Heart Rate:  [86] 86  Resp:  [18] 18  BP: (104-117)/(63-66) 117/63

## 2023-05-11 NOTE — ANESTHESIA PROCEDURE NOTES
Airway  Urgency: elective    Date/Time: 5/11/2023 11:22 AM  Airway not difficult    General Information and Staff    Patient location during procedure: OR  CRNA/CAA: Gayla Nair CRNA    Indications and Patient Condition  Indications for airway management: airway protection    Preoxygenated: yes  MILS not maintained throughout  Mask difficulty assessment: 2 - vent by mask + OA or adjuvant +/- NMBA    Final Airway Details  Final airway type: endotracheal airway      Successful airway: ETT  Cuffed: yes   Successful intubation technique: direct laryngoscopy  Facilitating devices/methods: intubating stylet  Endotracheal tube insertion site: oral  Blade: Tess  Blade size: 3  ETT size (mm): 7.0  Cormack-Lehane Classification: grade I - full view of glottis  Placement verified by: chest auscultation and capnometry   Cuff volume (mL): 7  Measured from: lips  ETT/EBT  to lips (cm): 21  Number of attempts at approach: 1  Assessment: lips, teeth, and gum same as pre-op and atraumatic intubation

## 2023-05-11 NOTE — INTERVAL H&P NOTE
"H&P reviewed. The patient was examined and there are no changes to the H&P.  U/s noted stones, normal gb wall, 5.6 mm cbd.  LFT\"S normal    Temp:  [96.8 °F (36 °C)-97.1 °F (36.2 °C)] 97.1 °F (36.2 °C)  Heart Rate:  [86] 86  Resp:  [18] 18  BP: (104-117)/(63-66) 117/63    "

## 2023-05-11 NOTE — ANESTHESIA POSTPROCEDURE EVALUATION
Patient: Yuridia Irizarry    Procedure Summary     Date: 05/11/23 Room / Location: [facility] OR 09 / [facility] OR    Anesthesia Start: 1110 Anesthesia Stop: 1313    Procedure: CHOLECYSTECTOMY LAPAROSCOPIC WITH DAVINCI ROBOT (Abdomen) Diagnosis:       Epigastric abdominal pain      Calculus of gallbladder with chronic cholecystitis without obstruction      Cholecystitis      (Epigastric abdominal pain [R10.13])      (Calculus of gallbladder with chronic cholecystitis without obstruction [K80.10])      (Cholecystitis [K81.9])    Surgeons: Eugene Rouse MD Provider: Gayla Nair CRNA    Anesthesia Type: general ASA Status: 3          Anesthesia Type: general    Vitals  No vitals data found for the desired time range.          Post Anesthesia Care and Evaluation    Patient location during evaluation: PACU  Patient participation: complete - patient participated  Level of consciousness: awake  Pain score: 0  Pain management: adequate    Airway patency: patent  Anesthetic complications: No anesthetic complications  PONV Status: none  Cardiovascular status: acceptable  Respiratory status: acceptable  Hydration status: acceptable    Comments: /50  HR 93  SPO2 97  T 97

## 2023-05-11 NOTE — ANESTHESIA PREPROCEDURE EVALUATION
Anesthesia Evaluation     Patient summary reviewed and Nursing notes reviewed   NPO Solid Status: > 8 hours  NPO Liquid Status: > 8 hours           Airway   Mallampati: I  TM distance: >3 FB  Neck ROM: full  Dental    (+) poor dentition    Pulmonary     breath sounds clear to auscultation  (+) a smoker Current Smoked day of surgery, asthma,  (-) shortness of breath, sleep apnea, rhonchi, decreased breath sounds, wheezes, rales  Cardiovascular   Exercise tolerance: good (4-7 METS)    ECG reviewed  Rhythm: regular  Rate: normal    (-) hypertension, past MI, dysrhythmias, angina, CASTILLO, murmur, cardiac stents, DVT    ROS comment: EK23  Date/Time: 2023 3:54 PM   Performed by: Neo Moise DO   Authorized by: Neo Moise DO   Interpreted by physician   Comments: EKG May 8, 2023 at 1554 reveals normal sinus rhythm at a rate of   89 bpm.  T wave flattening in lead III without inversion.  No ST elevation   or depression.  No evidence of acute ischemia.  QTc 428   Interpreted by Neo Moise DO on 2023 10:06 PM CDT        Neuro/Psych  (-) seizures, TIA, CVA, headaches (Migraines)  GI/Hepatic/Renal/Endo    (+) obesity,     (-) GERD, liver disease, no renal disease, diabetes    Musculoskeletal     Abdominal   (+) obese,    Substance History   (-) alcohol use, drug use     OB/GYN    (-)  Pregnant        Other        ROS/Med Hx Other: Hx: Asthma: uses albuterol inhaler when needed.Cannot remember the last time that it was used.                 Anesthesia Plan    ASA 3     general     (Hcg: negative today.   )  intravenous induction     Anesthetic plan, risks, benefits, and alternatives have been provided, discussed and informed consent has been obtained with: patient.        CODE STATUS:

## 2023-05-16 LAB — REF LAB TEST METHOD: NORMAL

## 2023-05-17 ENCOUNTER — OFFICE VISIT (OUTPATIENT)
Dept: SURGERY | Facility: CLINIC | Age: 23
End: 2023-05-17
Payer: COMMERCIAL

## 2023-05-17 VITALS
HEIGHT: 61 IN | SYSTOLIC BLOOD PRESSURE: 110 MMHG | BODY MASS INDEX: 34.93 KG/M2 | TEMPERATURE: 96.8 F | HEART RATE: 74 BPM | WEIGHT: 185 LBS | DIASTOLIC BLOOD PRESSURE: 68 MMHG

## 2023-05-17 DIAGNOSIS — K81.9 CHOLECYSTITIS: Primary | ICD-10-CM

## 2023-05-17 PROBLEM — R10.13 EPIGASTRIC ABDOMINAL PAIN: Status: RESOLVED | Noted: 2023-05-10 | Resolved: 2023-05-17

## 2023-05-17 PROCEDURE — 99024 POSTOP FOLLOW-UP VISIT: CPT | Performed by: SURGERY

## 2023-05-17 NOTE — LETTER
May 17, 2023     Patient: Yuridia Irizarry   YOB: 2000   Date of Visit: 5/17/2023       To Whom It May Concern:    It is my medical opinion that Yuridia Irizarry may return to work 6 hours a day on 5/18/23. On 5/26/23  may return to 8 hours daily.         Sincerely,          This document has been electronically signed by Eugene Rouse MD on May 17, 2023 14:20 CDT        Eugene Rouse MD    CC:   No Recipients

## 2023-05-17 NOTE — PROGRESS NOTES
22-year-old female who is now a week out from her robotic cholecystectomy.  Pathology was chronic cholecystitis and cholelithiasis.  Went over pathology with her answered all of her questions.  Patient is doing well.  She is tolerating regular diet and having normal bowel function.  No fever no chills.  On exam she is nonicteric she is nontoxic-appearing her abdomen is soft her incisions are all clean appear to be healing appropriately.  She is going to follow-up with us on apparent basis.  She was given paperwork to go back to work for 6 hours at a time for the next week and after that she is without any restrictions otherwise.

## 2023-05-22 LAB
QT INTERVAL: 352 MS
QTC INTERVAL: 428 MS